# Patient Record
Sex: MALE | Race: WHITE | NOT HISPANIC OR LATINO | URBAN - METROPOLITAN AREA
[De-identification: names, ages, dates, MRNs, and addresses within clinical notes are randomized per-mention and may not be internally consistent; named-entity substitution may affect disease eponyms.]

---

## 2018-04-20 ENCOUNTER — INPATIENT (INPATIENT)
Facility: HOSPITAL | Age: 83
LOS: 10 days | Discharge: HOME | DRG: 871 | End: 2018-05-01
Attending: INTERNAL MEDICINE | Admitting: INTERNAL MEDICINE
Payer: MEDICARE

## 2018-04-20 VITALS
SYSTOLIC BLOOD PRESSURE: 133 MMHG | RESPIRATION RATE: 16 BRPM | OXYGEN SATURATION: 98 % | TEMPERATURE: 98 F | DIASTOLIC BLOOD PRESSURE: 86 MMHG | HEART RATE: 78 BPM

## 2018-04-20 DIAGNOSIS — E87.5 HYPERKALEMIA: ICD-10-CM

## 2018-04-20 DIAGNOSIS — N17.9 ACUTE KIDNEY FAILURE, UNSPECIFIED: ICD-10-CM

## 2018-04-20 LAB
ALBUMIN SERPL ELPH-MCNC: 3.4 G/DL — SIGNIFICANT CHANGE UP (ref 3.3–5)
ALP SERPL-CCNC: 91 U/L — SIGNIFICANT CHANGE UP (ref 40–120)
ALT FLD-CCNC: 13 U/L — SIGNIFICANT CHANGE UP (ref 10–45)
ANION GAP SERPL CALC-SCNC: 22 MMOL/L — HIGH (ref 5–17)
ANION GAP SERPL CALC-SCNC: 23 MMOL/L — HIGH (ref 5–17)
ANION GAP SERPL CALC-SCNC: 24 MMOL/L — HIGH (ref 5–17)
APPEARANCE UR: CLEAR — SIGNIFICANT CHANGE UP
APTT BLD: 26.9 SEC — LOW (ref 27.5–37.4)
AST SERPL-CCNC: 58 U/L — HIGH (ref 10–40)
BASOPHILS # BLD AUTO: 0 K/UL — SIGNIFICANT CHANGE UP (ref 0–0.2)
BASOPHILS NFR BLD AUTO: 0.2 % — SIGNIFICANT CHANGE UP (ref 0–2)
BILIRUB SERPL-MCNC: 0.5 MG/DL — SIGNIFICANT CHANGE UP (ref 0.2–1.2)
BILIRUB UR-MCNC: NEGATIVE — SIGNIFICANT CHANGE UP
BUN SERPL-MCNC: 118 MG/DL — HIGH (ref 7–23)
BUN SERPL-MCNC: 129 MG/DL — HIGH (ref 7–23)
BUN SERPL-MCNC: 131 MG/DL — HIGH (ref 7–23)
CALCIUM SERPL-MCNC: 9 MG/DL — SIGNIFICANT CHANGE UP (ref 8.4–10.5)
CALCIUM SERPL-MCNC: 9 MG/DL — SIGNIFICANT CHANGE UP (ref 8.4–10.5)
CALCIUM SERPL-MCNC: 9.2 MG/DL — SIGNIFICANT CHANGE UP (ref 8.4–10.5)
CHLORIDE SERPL-SCNC: 94 MMOL/L — LOW (ref 96–108)
CHLORIDE SERPL-SCNC: 94 MMOL/L — LOW (ref 96–108)
CHLORIDE SERPL-SCNC: 99 MMOL/L — SIGNIFICANT CHANGE UP (ref 96–108)
CO2 SERPL-SCNC: 17 MMOL/L — LOW (ref 22–31)
CO2 SERPL-SCNC: 19 MMOL/L — LOW (ref 22–31)
CO2 SERPL-SCNC: 20 MMOL/L — LOW (ref 22–31)
COLOR SPEC: YELLOW — SIGNIFICANT CHANGE UP
CREAT SERPL-MCNC: 6.16 MG/DL — HIGH (ref 0.5–1.3)
CREAT SERPL-MCNC: 7.08 MG/DL — HIGH (ref 0.5–1.3)
CREAT SERPL-MCNC: 7.44 MG/DL — HIGH (ref 0.5–1.3)
DIFF PNL FLD: ABNORMAL
EOSINOPHIL # BLD AUTO: 0 K/UL — SIGNIFICANT CHANGE UP (ref 0–0.5)
EOSINOPHIL NFR BLD AUTO: 0 % — SIGNIFICANT CHANGE UP (ref 0–6)
GAS PNL BLDV: SIGNIFICANT CHANGE UP
GLUCOSE SERPL-MCNC: 115 MG/DL — HIGH (ref 70–99)
GLUCOSE SERPL-MCNC: 129 MG/DL — HIGH (ref 70–99)
GLUCOSE SERPL-MCNC: 95 MG/DL — SIGNIFICANT CHANGE UP (ref 70–99)
GLUCOSE UR QL: NEGATIVE — SIGNIFICANT CHANGE UP
HCT VFR BLD CALC: 40 % — SIGNIFICANT CHANGE UP (ref 39–50)
HGB BLD-MCNC: 13.4 G/DL — SIGNIFICANT CHANGE UP (ref 13–17)
INR BLD: 1.13 RATIO — SIGNIFICANT CHANGE UP (ref 0.88–1.16)
KETONES UR-MCNC: NEGATIVE — SIGNIFICANT CHANGE UP
LEUKOCYTE ESTERASE UR-ACNC: NEGATIVE — SIGNIFICANT CHANGE UP
LYMPHOCYTES # BLD AUTO: 0.9 K/UL — LOW (ref 1–3.3)
LYMPHOCYTES # BLD AUTO: 4.3 % — LOW (ref 13–44)
MCHC RBC-ENTMCNC: 30.7 PG — SIGNIFICANT CHANGE UP (ref 27–34)
MCHC RBC-ENTMCNC: 33.4 GM/DL — SIGNIFICANT CHANGE UP (ref 32–36)
MCV RBC AUTO: 91.9 FL — SIGNIFICANT CHANGE UP (ref 80–100)
MONOCYTES # BLD AUTO: 1.1 K/UL — HIGH (ref 0–0.9)
MONOCYTES NFR BLD AUTO: 5.6 % — SIGNIFICANT CHANGE UP (ref 2–14)
NEUTROPHILS # BLD AUTO: 18.1 K/UL — HIGH (ref 1.8–7.4)
NEUTROPHILS NFR BLD AUTO: 89.9 % — HIGH (ref 43–77)
NITRITE UR-MCNC: NEGATIVE — SIGNIFICANT CHANGE UP
PH UR: 6 — SIGNIFICANT CHANGE UP (ref 5–8)
PLATELET # BLD AUTO: 194 K/UL — SIGNIFICANT CHANGE UP (ref 150–400)
POTASSIUM SERPL-MCNC: 5.4 MMOL/L — HIGH (ref 3.5–5.3)
POTASSIUM SERPL-MCNC: 6.7 MMOL/L — CRITICAL HIGH (ref 3.5–5.3)
POTASSIUM SERPL-MCNC: 8.4 MMOL/L — CRITICAL HIGH (ref 3.5–5.3)
POTASSIUM SERPL-SCNC: 5.4 MMOL/L — HIGH (ref 3.5–5.3)
POTASSIUM SERPL-SCNC: 6.7 MMOL/L — CRITICAL HIGH (ref 3.5–5.3)
POTASSIUM SERPL-SCNC: 8.4 MMOL/L — CRITICAL HIGH (ref 3.5–5.3)
PROT SERPL-MCNC: 7.7 G/DL — SIGNIFICANT CHANGE UP (ref 6–8.3)
PROT UR-MCNC: SIGNIFICANT CHANGE UP
PROTHROM AB SERPL-ACNC: 12.3 SEC — SIGNIFICANT CHANGE UP (ref 9.8–12.7)
RBC # BLD: 4.35 M/UL — SIGNIFICANT CHANGE UP (ref 4.2–5.8)
RBC # FLD: 12.2 % — SIGNIFICANT CHANGE UP (ref 10.3–14.5)
RBC CASTS # UR COMP ASSIST: ABNORMAL /HPF (ref 0–2)
SODIUM SERPL-SCNC: 135 MMOL/L — SIGNIFICANT CHANGE UP (ref 135–145)
SODIUM SERPL-SCNC: 137 MMOL/L — SIGNIFICANT CHANGE UP (ref 135–145)
SODIUM SERPL-SCNC: 140 MMOL/L — SIGNIFICANT CHANGE UP (ref 135–145)
SP GR SPEC: 1.02 — SIGNIFICANT CHANGE UP (ref 1.01–1.02)
UROBILINOGEN FLD QL: NEGATIVE — SIGNIFICANT CHANGE UP
WBC # BLD: 20.1 K/UL — HIGH (ref 3.8–10.5)
WBC # FLD AUTO: 20.1 K/UL — HIGH (ref 3.8–10.5)
WBC UR QL: SIGNIFICANT CHANGE UP /HPF (ref 0–5)

## 2018-04-20 PROCEDURE — 74176 CT ABD & PELVIS W/O CONTRAST: CPT | Mod: 26

## 2018-04-20 PROCEDURE — 99233 SBSQ HOSP IP/OBS HIGH 50: CPT | Mod: GC

## 2018-04-20 PROCEDURE — 93010 ELECTROCARDIOGRAM REPORT: CPT

## 2018-04-20 PROCEDURE — 71045 X-RAY EXAM CHEST 1 VIEW: CPT | Mod: 26

## 2018-04-20 PROCEDURE — 99291 CRITICAL CARE FIRST HOUR: CPT

## 2018-04-20 PROCEDURE — 71250 CT THORAX DX C-: CPT | Mod: 26

## 2018-04-20 RX ORDER — INSULIN HUMAN 100 [IU]/ML
10 INJECTION, SOLUTION SUBCUTANEOUS ONCE
Qty: 0 | Refills: 0 | Status: COMPLETED | OUTPATIENT
Start: 2018-04-20 | End: 2018-04-20

## 2018-04-20 RX ORDER — CEFTRIAXONE 500 MG/1
1 INJECTION, POWDER, FOR SOLUTION INTRAMUSCULAR; INTRAVENOUS ONCE
Qty: 0 | Refills: 0 | Status: DISCONTINUED | OUTPATIENT
Start: 2018-04-20 | End: 2018-04-20

## 2018-04-20 RX ORDER — SODIUM CHLORIDE 9 MG/ML
1000 INJECTION, SOLUTION INTRAVENOUS
Qty: 0 | Refills: 0 | Status: DISCONTINUED | OUTPATIENT
Start: 2018-04-20 | End: 2018-04-21

## 2018-04-20 RX ORDER — AZITHROMYCIN 500 MG/1
500 TABLET, FILM COATED ORAL ONCE
Qty: 0 | Refills: 0 | Status: COMPLETED | OUTPATIENT
Start: 2018-04-20 | End: 2018-04-20

## 2018-04-20 RX ORDER — DEXTROSE 50 % IN WATER 50 %
50 SYRINGE (ML) INTRAVENOUS ONCE
Qty: 0 | Refills: 0 | Status: COMPLETED | OUTPATIENT
Start: 2018-04-20 | End: 2018-04-20

## 2018-04-20 RX ORDER — CALCIUM GLUCONATE 100 MG/ML
1 VIAL (ML) INTRAVENOUS ONCE
Qty: 0 | Refills: 0 | Status: COMPLETED | OUTPATIENT
Start: 2018-04-20 | End: 2018-04-20

## 2018-04-20 RX ORDER — SODIUM CHLORIDE 9 MG/ML
1000 INJECTION INTRAMUSCULAR; INTRAVENOUS; SUBCUTANEOUS ONCE
Qty: 0 | Refills: 0 | Status: COMPLETED | OUTPATIENT
Start: 2018-04-20 | End: 2018-04-20

## 2018-04-20 RX ORDER — SODIUM BICARBONATE 1 MEQ/ML
50 SYRINGE (ML) INTRAVENOUS ONCE
Qty: 0 | Refills: 0 | Status: COMPLETED | OUTPATIENT
Start: 2018-04-20 | End: 2018-04-20

## 2018-04-20 RX ORDER — CEFTRIAXONE 500 MG/1
1 INJECTION, POWDER, FOR SOLUTION INTRAMUSCULAR; INTRAVENOUS ONCE
Qty: 0 | Refills: 0 | Status: COMPLETED | OUTPATIENT
Start: 2018-04-20 | End: 2018-04-20

## 2018-04-20 RX ADMIN — SODIUM CHLORIDE 200 MILLILITER(S): 9 INJECTION INTRAMUSCULAR; INTRAVENOUS; SUBCUTANEOUS at 18:13

## 2018-04-20 RX ADMIN — AZITHROMYCIN 250 MILLIGRAM(S): 500 TABLET, FILM COATED ORAL at 19:35

## 2018-04-20 RX ADMIN — Medication 50 MILLIEQUIVALENT(S): at 19:36

## 2018-04-20 RX ADMIN — CEFTRIAXONE 100 GRAM(S): 500 INJECTION, POWDER, FOR SOLUTION INTRAMUSCULAR; INTRAVENOUS at 20:58

## 2018-04-20 RX ADMIN — INSULIN HUMAN 10 UNIT(S): 100 INJECTION, SOLUTION SUBCUTANEOUS at 19:37

## 2018-04-20 RX ADMIN — SODIUM CHLORIDE 100 MILLILITER(S): 9 INJECTION, SOLUTION INTRAVENOUS at 23:27

## 2018-04-20 RX ADMIN — Medication 200 GRAM(S): at 19:37

## 2018-04-20 RX ADMIN — Medication 50 MILLILITER(S): at 19:36

## 2018-04-20 NOTE — CONSULT NOTE ADULT - SUBJECTIVE AND OBJECTIVE BOX
86yom no PMHx (has not seen a doctor for years) initially presents for a mechanical fall, found to have acute renail failure and hyperkalemia to 8. Per wife, patient had not been taking care of himself, has been unable to walk, unshaven face, not eating. This change has been gradual and progressive.   Patient himself seems not aware of these changes. He does not remember that he had fallen, and denied any complaints. He was aware he was at hospital, but could not say name. He beleived it to be 1936.     Initial potassium improved to 6.7 s/p hyperkalemia cocktail.  Pt had CT A/P done, showed b/l mild hydronpehrosis. Also had cat placed with 2L of copper collar urine. CT chest showed RLL pna.     ED: Sodium bicarb x 1, insulin 10 x 1, d50 x 1, calcium gluc 1g.   1 L of fluids, Cat placed. Received ceftriaxone and azithromycin.     Physical Exam:   General: Unkept appearing elderly male, NAD  HEENT: poor dentition  CV: s1s2 mildly tachycardic, no murmurs  Pulm: RLL rhonchi  Abd: soft, nt, nd, indendation in epigastric area?   : no suprapubic tenderness, +cat.   Ext: 1+ b/l LE edema 86yom no PMHx (has not seen a doctor for years) initially presents for a mechanical fall, found to have acute renail failure and hyperkalemia to 8. Per wife, patient had not been taking care of himself, has been unable to walk, unshaven face, not eating. This change has been gradual and progressive.   Patient himself seems not aware of these changes. He does not remember that he had fallen, and denied any complaints. He was aware he was at hospital, but could not say name. He beleived it to be 1936.     Initial potassium improved to 6.7 s/p hyperkalemia cocktail.  Pt had CT A/P done, showed b/l mild hydronpehrosis. Also had cat placed with 2L of copper collar urine. CT chest showed RLL pna.     ED: Sodium bicarb x 1, insulin 10 x 1, d50 x 1, calcium gluc 1g.   1 L of fluids, Cat placed. Received ceftriaxone and azithromycin.     PAST MEDICAL & SURGICAL HISTORY:  No pertinent past medical history  No significant past surgical history    Allergies    No Known Allergies or Intolerances    Home Medications: None    SOCIAL HISTORY: Unknown if ever smokes. Lives with wife    FAMILY HISTORY: nonsignificant     CONSTITUTIONAL:  No weight loss, fever, chills, weakness  HEENT:  no vision changes, no URI symptoms.   SKIN:  No rash or itching.  CARDIOVASCULAR:  No chest pain or palpitations.   RESPIRATORY:  No shortness of breath, cough or sputum.  GASTROINTESTINAL:  No nausea, vomiting or diarrhea. No abdominal pain  GENITOURINARY:  decreased urination x 2 days.   NEUROLOGICAL:  No headache, dizziness, syncope, numbness or tingling in the extremities.   MUSCULOSKELETAL: + R shoulder pain  HEMATOLOGIC:  No bleeding or bruising.  ENDOCRINOLOGIC:  No reports of sweating, cold or heat intolerance.     Vital Signs Last 24 Hrs  T(C): 36.7 (2018 21:33), Max: 36.7 (2018 15:10)  T(F): 98.1 (2018 21:33), Max: 98.1 (2018 21:33)  HR: 101 (2018 21:33) (78 - 106)  BP: 130/80 (2018 21:33) (130/80 - 146/87)  BP(mean): --  RR: 16 (2018 21:33) (16 - 22)  SpO2: 98% (2018 21:33) (98% - 100%)    Physical Exam:   General: Unkept appearing elderly male, NAD  HEENT: poor dentition  CV: s1s2 mildly tachycardic, no murmurs  Pulm: RLL rhonchi  Abd: soft, nt, nd, indendation in epigastric area?   : no suprapubic tenderness, +cat.   Ext: 1+ b/l LE edema                           13.4   20.1  )-----------( 194      ( 2018 16:07 )             40.0     Auto Eosinophil # 0.0   / Auto Eosinophil % 0.0   / Auto Neutrophil # 18.1  / Auto Neutrophil % 89.9  / BANDS % x        04-20    140  |  99  |  118<H>  ----------------------------<  95  5.4<H>   |  19<L>  |  6.16<H>  04-20    137  |  94<L>  |  131<H>  ----------------------------<  129<H>  6.7<HH>   |  20<L>  |  7.44<H>  04-20    135  |  94<L>  |  129<H>  ----------------------------<  115<H>  8.4<HH>   |  17<L>  |  7.08<H>    Ca    9.0      2018 21:16  TPro  7.7  /  Alb  3.4  /  TBili  0.5  /  DBili  x   /  AST  58<H>  /  ALT  13  /  AlkPhos  91  04-20    PT/INR - ( 2018 20:07 )   PT: 12.3 sec;   INR: 1.13 ratio         PTT - ( 2018 20:07 )  PTT:26.9 sec  CARDIAC MARKERS ( 2018 21:16 )  x     / x     / 190 U/L / x     / x          Urinalysis Basic - ( 2018 18:29 )    Color: Yellow / Appearance: Clear / S.016 / pH: x  Gluc: x / Ketone: Negative  / Bili: Negative / Urobili: Negative   Blood: x / Protein: Trace / Nitrite: Negative   Leuk Esterase: Negative / RBC: 10-25 /HPF / WBC 3-5 /HPF   Sq Epi: x / Non Sq Epi: x / Bacteria: x    EKG: peaked T waves in V2 and V3 on admission.     CT Chest/A/PL Right lower lobe pneumonia. Small right pleural effusion.  Mild bilateral hydroureteronephrosis and distended urinary bladder. 86yom no PMHx (has not seen a doctor for years) initially presents for a mechanical fall, found to have acute renail failure and hyperkalemia to 8. Per wife, patient had not been taking care of himself, has been unable to walk, unshaven face, not eating. This change has been gradual and progressive.   Patient himself seems not aware of these changes. He does not remember that he had fallen, and denied any complaints. He was aware he was at hospital, but could not say name. He beleived it to be 1936.     Initial potassium improved to 6.7 s/p hyperkalemia cocktail.  Pt had CT A/P done, showed b/l mild hydronpehrosis. Also had cat placed with 2L of copper collar urine. CT chest showed RLL pna.     ED: Sodium bicarb x 1, insulin 10 x 1, d50 x 1, calcium gluc 1g.   1 L of fluids, Cat placed. Received ceftriaxone and azithromycin.     PAST MEDICAL & SURGICAL HISTORY:  No pertinent past medical history  No significant past surgical history    Allergies    No Known Allergies or Intolerances    Home Medications: None    SOCIAL HISTORY: Unknown if ever smokes. Lives with wife    FAMILY HISTORY: nonsignificant     Review of systems:   CONSTITUTIONAL:  No weight loss, fever, chills, weakness  HEENT:  no vision changes, no URI symptoms.   SKIN:  No rash or itching.  CARDIOVASCULAR:  No chest pain or palpitations.   RESPIRATORY:  No shortness of breath, cough or sputum.  GASTROINTESTINAL:  No nausea, vomiting or diarrhea. No abdominal pain  GENITOURINARY:  decreased urination x 2 days.   NEUROLOGICAL:  No headache, dizziness, syncope, numbness or tingling in the extremities.   MUSCULOSKELETAL: + R shoulder pain  HEMATOLOGIC:  No bleeding or bruising.  ENDOCRINOLOGIC:  No reports of sweating, cold or heat intolerance.     Vital Signs Last 24 Hrs  T(C): 36.7 (2018 21:33), Max: 36.7 (2018 15:10)  T(F): 98.1 (2018 21:33), Max: 98.1 (2018 21:33)  HR: 101 (2018 21:33) (78 - 106)  BP: 130/80 (2018 21:33) (130/80 - 146/87)  BP(mean): --  RR: 16 (2018 21:33) (16 - 22)  SpO2: 98% (2018 21:33) (98% - 100%)    Physical Exam:   General: Unkept appearing elderly male, NAD  HEENT: poor dentition  CV: s1s2 mildly tachycardic, no murmurs  Pulm: RLL rhonchi  Abd: soft, nt, nd, indendation in epigastric area?   : no suprapubic tenderness, +cat.   Ext: 1+ b/l LE edema                           13.4   20.1  )-----------( 194      ( 2018 16:07 )             40.0     Auto Eosinophil # 0.0   / Auto Eosinophil % 0.0   / Auto Neutrophil # 18.1  / Auto Neutrophil % 89.9  / BANDS % x        04-20    140  |  99  |  118<H>  ----------------------------<  95  5.4<H>   |  19<L>  |  6.16<H>  04-20    137  |  94<L>  |  131<H>  ----------------------------<  129<H>  6.7<HH>   |  20<L>  |  7.44<H>  04-20    135  |  94<L>  |  129<H>  ----------------------------<  115<H>  8.4<HH>   |  17<L>  |  7.08<H>    Ca    9.0      2018 21:16  TPro  7.7  /  Alb  3.4  /  TBili  0.5  /  DBili  x   /  AST  58<H>  /  ALT  13  /  AlkPhos  91  04-20    PT/INR - ( 2018 20:07 )   PT: 12.3 sec;   INR: 1.13 ratio         PTT - ( 2018 20:07 )  PTT:26.9 sec  CARDIAC MARKERS ( 2018 21:16 )  x     / x     / 190 U/L / x     / x          Urinalysis Basic - ( 2018 18:29 )    Color: Yellow / Appearance: Clear / S.016 / pH: x  Gluc: x / Ketone: Negative  / Bili: Negative / Urobili: Negative   Blood: x / Protein: Trace / Nitrite: Negative   Leuk Esterase: Negative / RBC: 10-25 /HPF / WBC 3-5 /HPF   Sq Epi: x / Non Sq Epi: x / Bacteria: x    EKG: peaked T waves in V2 and V3 on admission.     CT Chest/A/PL Right lower lobe pneumonia. Small right pleural effusion.  Mild bilateral hydroureteronephrosis and distended urinary bladder.

## 2018-04-20 NOTE — ED PROVIDER NOTE - OBJECTIVE STATEMENT
83 y/o male who presents s/p fall that has been described as mechanical in nature without any associated LOC, HA, visual disturbance, neck pain CP, palpitation, AP, N/V or focal weakness either prior to or after the event. Only when asked did he agree to some R shoulder pain. His wife however has offered a host of other concerns including his not eating, inability to ambulate, unshaven face, the fact that she he prefers to wear thick "athletic" socks instead of shoes. She agrees that he has had a constant, gradual, progressive decline in both his physical condition and cognitive capacity, which appears to have been exacerbated by his poor oral intake and personal hygiene and has been without relieving factor. He has not seen a physician in years.

## 2018-04-20 NOTE — ED ADULT NURSE REASSESSMENT NOTE - NS ED NURSE REASSESS COMMENT FT1
cat placed by previous RN. 1800mL emptied from bag. Pt is more alert and speaking to RN. Pt repositioned. sleeping in bed at this time. will reassess.

## 2018-04-20 NOTE — ED ADULT NURSE NOTE - ED STAT RN HANDOFF DETAILS
Bedside report given to on coming nurse Gaby Villalobos. Understands pmh, medications given and plan of care for patient. Patient in stable condition, vital signs updated, has no complaints at this time and has been updated on care plan. Explained to patient that it is change of shift and new nurse is taking over, pt verbalized understanding.

## 2018-04-20 NOTE — ED ADULT NURSE NOTE - OBJECTIVE STATEMENT
83 y/o male BIBA after fall yesterday. Pt states he had a trip and fall and did not hit his Head. C/o right shoulder pain. Wife states he has been more confused than usual with decreased walking and PO intake. Denies LOC, chest pain, sob, ha, n/v/d, abdominal pain, f/c, urinary symptoms, hematuria. A&Ox4, tachycardic, skin warm dry and intact, MAEx4, lungs CTA, abd soft nondistended. At home uses a walker. Pt resting comfortably no complaints at this time. Patient's bed in the lowest position, explained plan of care to patient and family members. Will continue to reassess.

## 2018-04-20 NOTE — ED PROVIDER NOTE - CRITICAL CARE PROVIDED
documentation/consultation with other physicians/consult w/ pt's family directly relating to pts condition/interpretation of diagnostic studies/additional history taking/direct patient care (not related to procedure)

## 2018-04-20 NOTE — CONSULT NOTE ADULT - PROBLEM SELECTOR RECOMMENDATION 9
MATTHEW in the setting of obstruction. Pt with b/l hydronephrosis and significant urine output with cat placement. Recommend rule out underlying rhabdomyolysis in the setting of fall with CPK level. Recommend BNP level. Pt would benefit from bicarbonate drip IV therapy. Continue to monitor urine output, BMP and intake/output. Avoid NSAIDs, ACEI/ARBs, RCA and nephrotoxins

## 2018-04-20 NOTE — CONSULT NOTE ADULT - ASSESSMENT
86yom no PMHx (has not seen a doctor for years) initially presents for a mechanical fall, found to have acute renail failure and hyperkalemia likely multifactorial in setting of decreased po intake/dedhydration, obstructive uropathy, possible underling DM2 (FS elevated). Pt also with worsening mental status, likely progressive dementia.     Hyperkalemia 2/2 Acute renail failure:    -serial BMP  -continue with cat   -repeat EKG  -f/up CK  -tele monitoring  -monitor ins and outs  - Avoid NSAIDs, ACEI/ARBs,nephrotoxins.  --f/up nephrology recs   -check hgba1c (FS elevated)     Metabolic encephalopathy: AO x 1-2. May be 2/2 uremia however, likely underlying dementia also given progressive decline.   -would check b12, folate, tsh   -social work consult     Would admit to tele. 86yom no PMHx (has not seen a doctor for years) initially presents for a mechanical fall, found to have acute renail failure and hyperkalemia likely multifactorial in setting of decreased po intake/dedhydration, obstructive uropathy, possible underling DM2 (FS elevated). Pt also with worsening mental status, likely progressive dementia.     Hyperkalemia 2/2 Acute renail failure:    -serial BMP  -continue with cat   -repeat EKG  -f/up CK  -tele monitoring  -monitor ins and outs  - Avoid NSAIDs, ACEI/ARBs,nephrotoxins.  --f/up nephrology recs   -check hgba1c (FS elevated)     Metabolic encephalopathy: AO x 1-2. May be 2/2 uremia however, likely underlying dementia also given progressive decline.   -would check b12, folate, tsh   -social work consult     Sepsis: Pt with leukocytosis and tachycardia, meeting SIRS criteria. RLL pna. Remains hemodynamically stable.   -f/up blood cultures  -monitor white count  -continue with ceftriaxone, however if fails to improve, consider broadening to cover asp pna.     Patient currently does not have an indication for urgent HD/not a MICU candidate.   Would admit to telemetry.     d/w Dr. Justice James, PGY3  [phone]

## 2018-04-20 NOTE — CONSULT NOTE ADULT - ATTENDING COMMENTS
87 yo man who presented today after a fall with ARF and hyperkalemia found to have bilateral hydronephrosis and distended bladder.  After relief of bladder outlet obstruction and drainage of 1700+ cc urine, K has decreased to 5.4.  Creatinine also beginning to decrease.  He is hemodynamically stable, not in need of emergent HD and not in need of critical care at this time
Pt seen4/21 feeling improved  1.  HyperK+--responsive to HCO3.  Continue drip.  Can switch to hypotonic with increase in Na  2.  Acidosis--responding to NaHCO3  3.  Renal failure--non oliguric, no HD  4.  Obstructive uropathy--continue cat drainage

## 2018-04-20 NOTE — ED PROVIDER NOTE - CARE PLAN
Principal Discharge DX:	MATTHEW (acute kidney injury)  Secondary Diagnosis:	Hyperkalemia  Secondary Diagnosis:	Pneumonia

## 2018-04-20 NOTE — ED PROVIDER NOTE - PROGRESS NOTE DETAILS
Labs back thus far noted. Additional labs ordered. Wife now reports his not having urinated in 2 days. Straight cath dry. Junior placed. IVF started. CXR appreciated. Awaiting interpretation of CTs. Patient has produced nearly 1700ccs of initially copper colored and now more clear urine. HyperK cocktail already provided. Discussed with Nephrology. Discussed with MICU. CT appreciated. ABX provided. K improved. Nephrology does not recommend emergent HD. Discussed with MICU. Patient to be admitted to Medicine.

## 2018-04-20 NOTE — ED PROVIDER NOTE - SKIN, MLM
Skin warm, dry and intact. Dry, flaky, bilateral lower extremities c/ 2-3+ pitting edema and thickening of toenail c/w onychomycosis.

## 2018-04-20 NOTE — ED PROVIDER NOTE - MEDICAL DECISION MAKING DETAILS
Patient reportedly brought to the ED due to a fall but this is in fact not the primary issue. He sustained no injury. He is in very poor physical condition and based on my discussion with his wife and his physical appearance, it is clear he has had a gradual, progressive decline in both cognitive and physical capacity. He is not capable of caring for himself and his wife is not capable of caring for him on his own. He has not seen a physician in years. Without assistance, I do not believe he is safe in the home. Basic screening studies have been ordered for the purpose of admission. Patient reportedly brought to the ED due to a fall but this is in fact not the primary issue. He sustained no injury. He is in very poor physical condition and based on my discussion with his wife and his physical appearance, it is clear he has had a gradual, progressive decline in both cognitive and physical capacity. He is not capable of caring for himself and his wife is not capable of caring for him on his own. He has not seen a physician in years. Without assistance, I do not believe he is safe in the home. Basic screening studies have been ordered for the purpose of admission. He does not require any specific diagnostic study as it relates to his fall.

## 2018-04-20 NOTE — ED PROVIDER NOTE - GASTROINTESTINAL, MLM
Abdomen soft, non-tender, no guarding. Abdomen soft, non-tender, no guarding. Soft, non-tender L inguinal hernia.

## 2018-04-20 NOTE — CONSULT NOTE ADULT - ASSESSMENT
86 year old male with no known PMH presents to ER after a fall. Pt was found to have hyperkalemia and acute renal failure in ER.

## 2018-04-20 NOTE — ED PROVIDER NOTE - MUSCULOSKELETAL, MLM
Spine appears normal, range of motion is not limited, no muscle or joint tenderness. FROM of R shoulder s/ hesitation, discomfort or reproducible ttp.

## 2018-04-20 NOTE — ED PROVIDER NOTE - CONSTITUTIONAL, MLM
normal... Unkempt. Emaciated. Cachectic. Awake, alert, oriented to person, place, time/situation (did not know the year; knew it was Spring) and in no apparent distress.

## 2018-04-20 NOTE — CONSULT NOTE ADULT - PROBLEM SELECTOR RECOMMENDATION 2
Pt with elevated serum potassium in the setting of MATTHEW and obstruction. Recommend repeat BMP stat. Pt amenable to dialysis if needed for hyperkalemia. Pt would need strict telemetry if dialysis is needed. Advise to start bicarbonate drip

## 2018-04-20 NOTE — CONSULT NOTE ADULT - SUBJECTIVE AND OBJECTIVE BOX
[facility] DIVISION OF KIDNEY DISEASES AND HYPERTENSION -- INITIAL CONSULT NOTE  --------------------------------------------------------------------------------  HPI: 86 year old male with no known PMH presents to ER after a fall. Pt was found to have hyperkalemia and acute renal failure in ER. Pt states he has not any history of renal failure in the past. Pt states he has not seen a physician in the past (over 5-10 years). Pt denies NSAID use, Drug use or ABX use. Pt denies family history of renal disease. Pt states he has been eating normally and has been urination without difficulty. Pt seen and examined in ER. Denies CP, SOB, palpitations, fever, chills and cough. Pt notes LE edema in the last one week. Denies SOB on exertion.  Pt had a cat catheter placed in the ER with 1700cc output in one hour. Pt had CT Ab/pelvis in ER which revealed Mild hydronephrosis bilaterally with distended bladder.     PAST HISTORY  --------------------------------------------------------------------------------  PAST MEDICAL & SURGICAL HISTORY:  No pertinent past medical history  No significant past surgical history    FAMILY HISTORY:    PAST SOCIAL HISTORY:    ALLERGIES & MEDICATIONS  --------------------------------------------------------------------------------  Allergies    No Known Allergies    Intolerances      Standing Inpatient Medications    PRN Inpatient Medications      REVIEW OF SYSTEMS  --------------------------------------------------------------------------------  Gen: No fatigue, fevers/chills, weakness  Head/Eyes/Ears/Mouth: No headache  Respiratory: No dyspnea, cough  CV: No chest pain, PND  GI: No abdominal pain, diarrhea, constipation, nausea, vomiting  : No increased frequency  MSK: +edema  Neuro: No dizziness/lightheadedness  Heme: No bleeding    All other systems were reviewed and are negative, except as noted.    VITALS/PHYSICAL EXAM  --------------------------------------------------------------------------------  T(C): 36.7 (04-20-18 @ 15:10), Max: 36.7 (04-20-18 @ 15:10)  HR: 85 (04-20-18 @ 16:48) (78 - 106)  BP: 143/85 (04-20-18 @ 16:48) (133/86 - 146/87)  RR: 18 (04-20-18 @ 16:48) (16 - 22)  SpO2: 99% (04-20-18 @ 16:48) (98% - 100%)  Wt(kg): --    Physical Exam:  	Gen: Elderly, Cachetic   	HEENT: Poor dentition   	Pulm: + crackles B/L  	CV: RRR, S1S2; no rub  	Abd: +BS, soft, nontender/nondistended  	: No suprapubic tenderness + cat catheter   	UE: Warm, no asterixis  	LE: Warm, + b/l LE edema  	Neuro: AAOX3, Answers questions appropriately  	Skin: Warm, without rashes    LABS/STUDIES  --------------------------------------------------------------------------------              13.4   20.1  >-----------<  194      [04-20-18 @ 16:07]              40.0     137  |  94  |  131  ----------------------------<  129      [04-20-18 @ 17:41]  6.7   |  20  |  7.44        Ca     9.2     [04-20-18 @ 17:41]    TPro  7.7  /  Alb  3.4  /  TBili  0.5  /  DBili  x   /  AST  58  /  ALT  13  /  AlkPhos  91  [04-20-18 @ 16:07]    PT/INR: PT 12.3 , INR 1.13       [04-20-18 @ 20:07]  PTT: 26.9       [04-20-18 @ 20:07]      Creatinine Trend:  SCr 7.44 [04-20 @ 17:41]  SCr 7.08 [04-20 @ 16:07]    Urinalysis - [04-20-18 @ 18:29]      Color Yellow / Appearance Clear / SG 1.016 / pH 6.0      Gluc Negative / Ketone Negative  / Bili Negative / Urobili Negative       Blood Moderate / Protein Trace / Leuk Est Negative / Nitrite Negative      RBC 10-25 / WBC 3-5 / Hyaline  / Gran  / Sq Epi  / Non Sq Epi  / Bacteria

## 2018-04-21 DIAGNOSIS — Z29.9 ENCOUNTER FOR PROPHYLACTIC MEASURES, UNSPECIFIED: ICD-10-CM

## 2018-04-21 DIAGNOSIS — N17.9 ACUTE KIDNEY FAILURE, UNSPECIFIED: ICD-10-CM

## 2018-04-21 DIAGNOSIS — A41.9 SEPSIS, UNSPECIFIED ORGANISM: ICD-10-CM
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
## 2018-04-21 DIAGNOSIS — E87.2 ACIDOSIS: ICD-10-CM

## 2018-04-21 DIAGNOSIS — J18.1 LOBAR PNEUMONIA, UNSPECIFIED ORGANISM: ICD-10-CM

## 2018-04-21 DIAGNOSIS — R63.8 OTHER SYMPTOMS AND SIGNS CONCERNING FOOD AND FLUID INTAKE: ICD-10-CM

## 2018-04-21 DIAGNOSIS — G93.41 METABOLIC ENCEPHALOPATHY: ICD-10-CM

## 2018-04-21 DIAGNOSIS — M79.89 OTHER SPECIFIED SOFT TISSUE DISORDERS: ICD-10-CM

## 2018-04-21 LAB
ALBUMIN SERPL ELPH-MCNC: 3.5 G/DL — SIGNIFICANT CHANGE UP (ref 3.3–5)
ALP SERPL-CCNC: 93 U/L — SIGNIFICANT CHANGE UP (ref 40–120)
ALT FLD-CCNC: 12 U/L — SIGNIFICANT CHANGE UP (ref 10–45)
ANION GAP SERPL CALC-SCNC: 16 MMOL/L — SIGNIFICANT CHANGE UP (ref 5–17)
ANION GAP SERPL CALC-SCNC: 17 MMOL/L — SIGNIFICANT CHANGE UP (ref 5–17)
ANION GAP SERPL CALC-SCNC: 18 MMOL/L — HIGH (ref 5–17)
APTT BLD: 26.8 SEC — LOW (ref 27.5–37.4)
AST SERPL-CCNC: 8 U/L — LOW (ref 10–40)
BASOPHILS # BLD AUTO: 0 K/UL — SIGNIFICANT CHANGE UP (ref 0–0.2)
BILIRUB SERPL-MCNC: 0.5 MG/DL — SIGNIFICANT CHANGE UP (ref 0.2–1.2)
BUN SERPL-MCNC: 100 MG/DL — HIGH (ref 7–23)
BUN SERPL-MCNC: 100 MG/DL — HIGH (ref 7–23)
BUN SERPL-MCNC: 94 MG/DL — HIGH (ref 7–23)
CALCIUM SERPL-MCNC: 8.8 MG/DL — SIGNIFICANT CHANGE UP (ref 8.4–10.5)
CALCIUM SERPL-MCNC: 9 MG/DL — SIGNIFICANT CHANGE UP (ref 8.4–10.5)
CALCIUM SERPL-MCNC: 9.2 MG/DL — SIGNIFICANT CHANGE UP (ref 8.4–10.5)
CHLORIDE SERPL-SCNC: 100 MMOL/L — SIGNIFICANT CHANGE UP (ref 96–108)
CHLORIDE SERPL-SCNC: 101 MMOL/L — SIGNIFICANT CHANGE UP (ref 96–108)
CHLORIDE SERPL-SCNC: 103 MMOL/L — SIGNIFICANT CHANGE UP (ref 96–108)
CO2 SERPL-SCNC: 24 MMOL/L — SIGNIFICANT CHANGE UP (ref 22–31)
CO2 SERPL-SCNC: 25 MMOL/L — SIGNIFICANT CHANGE UP (ref 22–31)
CO2 SERPL-SCNC: 27 MMOL/L — SIGNIFICANT CHANGE UP (ref 22–31)
CREAT ?TM UR-MCNC: 86 MG/DL — SIGNIFICANT CHANGE UP
CREAT SERPL-MCNC: 3.17 MG/DL — HIGH (ref 0.5–1.3)
CREAT SERPL-MCNC: 4.3 MG/DL — HIGH (ref 0.5–1.3)
CREAT SERPL-MCNC: 4.36 MG/DL — HIGH (ref 0.5–1.3)
CULTURE RESULTS: NO GROWTH — SIGNIFICANT CHANGE UP
EOSINOPHIL # BLD AUTO: 0 K/UL — SIGNIFICANT CHANGE UP (ref 0–0.5)
FOLATE SERPL-MCNC: 8.1 NG/ML — SIGNIFICANT CHANGE UP
GAS PNL BLDV: SIGNIFICANT CHANGE UP
GAS PNL BLDV: SIGNIFICANT CHANGE UP
GLUCOSE SERPL-MCNC: 127 MG/DL — HIGH (ref 70–99)
GLUCOSE SERPL-MCNC: 133 MG/DL — HIGH (ref 70–99)
GLUCOSE SERPL-MCNC: 141 MG/DL — HIGH (ref 70–99)
HCT VFR BLD CALC: 35.9 % — LOW (ref 39–50)
HGB BLD-MCNC: 11.9 G/DL — LOW (ref 13–17)
LEGIONELLA AG UR QL: NEGATIVE — SIGNIFICANT CHANGE UP
LYMPHOCYTES # BLD AUTO: 0.6 K/UL — LOW (ref 1–3.3)
LYMPHOCYTES # BLD AUTO: 4 % — LOW (ref 13–44)
MAGNESIUM SERPL-MCNC: 2.6 MG/DL — SIGNIFICANT CHANGE UP (ref 1.6–2.6)
MCHC RBC-ENTMCNC: 30.6 PG — SIGNIFICANT CHANGE UP (ref 27–34)
MCHC RBC-ENTMCNC: 33.2 GM/DL — SIGNIFICANT CHANGE UP (ref 32–36)
MCV RBC AUTO: 92.2 FL — SIGNIFICANT CHANGE UP (ref 80–100)
MONOCYTES # BLD AUTO: 1 K/UL — HIGH (ref 0–0.9)
MONOCYTES NFR BLD AUTO: 4 % — SIGNIFICANT CHANGE UP (ref 2–14)
NEUTROPHILS # BLD AUTO: 14.2 K/UL — HIGH (ref 1.8–7.4)
NEUTROPHILS NFR BLD AUTO: 92 % — HIGH (ref 43–77)
NT-PROBNP SERPL-SCNC: 1397 PG/ML — HIGH (ref 0–300)
PCP SPEC-MCNC: SIGNIFICANT CHANGE UP
PHOSPHATE SERPL-MCNC: 4.4 MG/DL — SIGNIFICANT CHANGE UP (ref 2.5–4.5)
PLATELET # BLD AUTO: 201 K/UL — SIGNIFICANT CHANGE UP (ref 150–400)
POTASSIUM SERPL-MCNC: 4.7 MMOL/L — SIGNIFICANT CHANGE UP (ref 3.5–5.3)
POTASSIUM SERPL-MCNC: 4.7 MMOL/L — SIGNIFICANT CHANGE UP (ref 3.5–5.3)
POTASSIUM SERPL-MCNC: 4.9 MMOL/L — SIGNIFICANT CHANGE UP (ref 3.5–5.3)
POTASSIUM SERPL-SCNC: 4.7 MMOL/L — SIGNIFICANT CHANGE UP (ref 3.5–5.3)
POTASSIUM SERPL-SCNC: 4.7 MMOL/L — SIGNIFICANT CHANGE UP (ref 3.5–5.3)
POTASSIUM SERPL-SCNC: 4.9 MMOL/L — SIGNIFICANT CHANGE UP (ref 3.5–5.3)
PROT ?TM UR-MCNC: 57 MG/DL — HIGH (ref 0–12)
PROT SERPL-MCNC: 7.2 G/DL — SIGNIFICANT CHANGE UP (ref 6–8.3)
PROT/CREAT UR-RTO: 0.7 RATIO — HIGH (ref 0–0.2)
RBC # BLD: 3.89 M/UL — LOW (ref 4.2–5.8)
RBC # FLD: 12.2 % — SIGNIFICANT CHANGE UP (ref 10.3–14.5)
SODIUM SERPL-SCNC: 142 MMOL/L — SIGNIFICANT CHANGE UP (ref 135–145)
SODIUM SERPL-SCNC: 143 MMOL/L — SIGNIFICANT CHANGE UP (ref 135–145)
SODIUM SERPL-SCNC: 146 MMOL/L — HIGH (ref 135–145)
SODIUM UR-SCNC: <20 MMOL/L — SIGNIFICANT CHANGE UP
SPECIMEN SOURCE: SIGNIFICANT CHANGE UP
T PALLIDUM AB TITR SER: NEGATIVE — SIGNIFICANT CHANGE UP
TSH SERPL-MCNC: 0.27 UIU/ML — SIGNIFICANT CHANGE UP (ref 0.27–4.2)
UUN UR-MCNC: 1042 MG/DL — SIGNIFICANT CHANGE UP
VIT B12 SERPL-MCNC: 1013 PG/ML — SIGNIFICANT CHANGE UP (ref 232–1245)
WBC # BLD: 16.8 K/UL — HIGH (ref 3.8–10.5)
WBC # FLD AUTO: 16.8 K/UL — HIGH (ref 3.8–10.5)

## 2018-04-21 PROCEDURE — 99223 1ST HOSP IP/OBS HIGH 75: CPT | Mod: GC

## 2018-04-21 PROCEDURE — 70450 CT HEAD/BRAIN W/O DYE: CPT | Mod: 26

## 2018-04-21 PROCEDURE — 99233 SBSQ HOSP IP/OBS HIGH 50: CPT | Mod: GC

## 2018-04-21 RX ORDER — SODIUM POLYSTYRENE SULFONATE 4.1 MEQ/G
30 POWDER, FOR SUSPENSION ORAL ONCE
Qty: 0 | Refills: 0 | Status: COMPLETED | OUTPATIENT
Start: 2018-04-21 | End: 2018-04-21

## 2018-04-21 RX ORDER — SODIUM CHLORIDE 9 MG/ML
1000 INJECTION INTRAMUSCULAR; INTRAVENOUS; SUBCUTANEOUS
Qty: 0 | Refills: 0 | Status: DISCONTINUED | OUTPATIENT
Start: 2018-04-21 | End: 2018-04-21

## 2018-04-21 RX ORDER — TAMSULOSIN HYDROCHLORIDE 0.4 MG/1
0.4 CAPSULE ORAL AT BEDTIME
Qty: 0 | Refills: 0 | Status: DISCONTINUED | OUTPATIENT
Start: 2018-04-21 | End: 2018-05-01

## 2018-04-21 RX ORDER — PIPERACILLIN AND TAZOBACTAM 4; .5 G/20ML; G/20ML
3.38 INJECTION, POWDER, LYOPHILIZED, FOR SOLUTION INTRAVENOUS EVERY 12 HOURS
Qty: 0 | Refills: 0 | Status: DISCONTINUED | OUTPATIENT
Start: 2018-04-21 | End: 2018-04-24

## 2018-04-21 RX ORDER — SODIUM CHLORIDE 9 MG/ML
250 INJECTION INTRAMUSCULAR; INTRAVENOUS; SUBCUTANEOUS ONCE
Qty: 0 | Refills: 0 | Status: COMPLETED | OUTPATIENT
Start: 2018-04-21 | End: 2018-04-21

## 2018-04-21 RX ORDER — HEPARIN SODIUM 5000 [USP'U]/ML
5000 INJECTION INTRAVENOUS; SUBCUTANEOUS EVERY 8 HOURS
Qty: 0 | Refills: 0 | Status: DISCONTINUED | OUTPATIENT
Start: 2018-04-21 | End: 2018-04-23

## 2018-04-21 RX ORDER — SODIUM CHLORIDE 9 MG/ML
1000 INJECTION, SOLUTION INTRAVENOUS
Qty: 0 | Refills: 0 | Status: DISCONTINUED | OUTPATIENT
Start: 2018-04-21 | End: 2018-04-21

## 2018-04-21 RX ADMIN — SODIUM CHLORIDE 500 MILLILITER(S): 9 INJECTION INTRAMUSCULAR; INTRAVENOUS; SUBCUTANEOUS at 13:55

## 2018-04-21 RX ADMIN — HEPARIN SODIUM 5000 UNIT(S): 5000 INJECTION INTRAVENOUS; SUBCUTANEOUS at 14:55

## 2018-04-21 RX ADMIN — SODIUM POLYSTYRENE SULFONATE 30 GRAM(S): 4.1 POWDER, FOR SUSPENSION ORAL at 04:33

## 2018-04-21 RX ADMIN — HEPARIN SODIUM 5000 UNIT(S): 5000 INJECTION INTRAVENOUS; SUBCUTANEOUS at 05:11

## 2018-04-21 RX ADMIN — TAMSULOSIN HYDROCHLORIDE 0.4 MILLIGRAM(S): 0.4 CAPSULE ORAL at 21:22

## 2018-04-21 RX ADMIN — PIPERACILLIN AND TAZOBACTAM 25 GRAM(S): 4; .5 INJECTION, POWDER, LYOPHILIZED, FOR SOLUTION INTRAVENOUS at 18:25

## 2018-04-21 RX ADMIN — PIPERACILLIN AND TAZOBACTAM 25 GRAM(S): 4; .5 INJECTION, POWDER, LYOPHILIZED, FOR SOLUTION INTRAVENOUS at 05:11

## 2018-04-21 RX ADMIN — HEPARIN SODIUM 5000 UNIT(S): 5000 INJECTION INTRAVENOUS; SUBCUTANEOUS at 21:22

## 2018-04-21 NOTE — H&P ADULT - NSHPSOCIALHISTORY_GEN_ALL_CORE
Social History:    Marital Status:  (   )    (   ) Single    (   )    (  )   Occupation:   Lives with: (  ) alone  (  ) children   (  ) spouse   (  ) parents  (  ) other    Substance Use (street drugs): (  ) never used  (  ) other:  Tobacco Usage:  (   ) never smoked   (   ) former smoker   (   ) current smoker  (     ) pack year  (        ) last cigarette date  Alcohol Usage:  Sexual History: Social History:    Marital Status:  (X)    (   ) Single    (   )    (  )   Occupation: unknown  Lives with: (  ) alone  (  ) children   (X) spouse   (  ) parents  (  ) other Social History:    Marital Status:  (X)    (   ) Single    (   )    (  )   Occupation: Retired .  Lives with: (  ) alone  (  ) children   (X) spouse   (  ) parents  (  ) other  Denies hx of tobacco or alcohol or illicit drug use. States his hobby is "reading the newspaper".

## 2018-04-21 NOTE — PROGRESS NOTE ADULT - PROBLEM SELECTOR PLAN 3
-acute encephalopathy most likely 2/2 uremic encephalopathy, though must r/o other etiologies are progressive decline, concerning of dementia vs infectious etiology vs. nutritional deficiencies vs intracranial pathology (malignancy), and patient had recent fall  - CT head w/out e/o hemorrhage or mass, although concerning for NPH  -B12, folate, TSH, RPR, HIV pending

## 2018-04-21 NOTE — H&P ADULT - ASSESSMENT
85 y/o Male w/ no known pmhx presenting w/ 85 y/o Male w/ no known pmhx presenting s/p fall, found to be hyperkalemic, victoria 2/ 2 obstructive uropathy, acute encephalopathy most likely 2/2 to uremia and anion gap metabolic acidosis 2/2 uremia. 85 y/o Male w/ no known pmhx presenting s/p fall, found to be hyperkalemic, victoria 2/ 2 obstructive uropathy, acute encephalopathy most likely 2/2 to uremia, anion gap metabolic acidosis 2/2 uremia and sepsis 2/2 pna. 85 y/o cachectic Male w/ no known pmhx presenting s/p fall, found to be hyperkalemic, victoria 2/ 2 obstructive uropathy, acute encephalopathy most likely 2/2 to uremia, anion gap metabolic acidosis 2/2 uremia and sepsis 2/2 pna.

## 2018-04-21 NOTE — H&P ADULT - NSHPPHYSICALEXAM_GEN_ALL_CORE
Vital Signs Last 24 Hrs  T(C): 36.7 (20 Apr 2018 21:33), Max: 36.7 (20 Apr 2018 15:10)  T(F): 98.1 (20 Apr 2018 21:33), Max: 98.1 (20 Apr 2018 21:33)  HR: 101 (20 Apr 2018 21:33) (78 - 106)  BP: 130/80 (20 Apr 2018 21:33) (130/80 - 146/87)  BP(mean): --  RR: 16 (20 Apr 2018 21:33) (16 - 22)  SpO2: 98% (20 Apr 2018 21:33) (98% - 100%)  PHYSICAL EXAM:  GENERAL: NAD, well-groomed, well-developed  HEAD:  Atraumatic, Normocephalic  EYES: EOMI, PERRLA, conjunctiva and sclera clear  ENMT: No tonsillar erythema, exudates, or enlargement; Moist mucous membranes, Good dentition, No lesions  NECK: Supple, No JVD, Normal thyroid  CHEST/LUNG: Clear to percussion bilaterally; No rales, rhonchi, wheezing, or rubs  HEART: Regular rate and rhythm; No murmurs, rubs, or gallops  ABDOMEN: Soft, Nontender, Nondistended; Bowel sounds present  EXTREMITIES:  2+ Peripheral Pulses, No clubbing, cyanosis, or edema  LYMPH: No lymphadenopathy noted  SKIN: No rashes or lesions  NERVOUS SYSTEM:  Alert & Oriented X3, Good concentration; Motor Strength 5/5 B/L upper and lower extremities; DTRs 2+ intact and symmetric Vital Signs Last 24 Hrs  T(C): 36.7 (20 Apr 2018 21:33), Max: 36.7 (20 Apr 2018 15:10)  T(F): 98.1 (20 Apr 2018 21:33), Max: 98.1 (20 Apr 2018 21:33)  HR: 101 (20 Apr 2018 21:33) (78 - 106)  BP: 130/80 (20 Apr 2018 21:33) (130/80 - 146/87)  BP(mean): --  RR: 16 (20 Apr 2018 21:33) (16 - 22)  SpO2: 98% (20 Apr 2018 21:33) (98% - 100%)  PHYSICAL EXAM:  GENERAL: disheveled appearance, cachetic, frail, patient hicupping  HEAD:  Atraumatic, Normocephalic  EYES: conjunctiva and sclera clear  ENMT: dry mucous membranes  NECK: Supple  CHEST/LUNG: Pectus excavatum, clear to ausculatation anteriorly  HEART: Regular rate and rhythm; No murmurs, rubs, or gallops  ABDOMEN: Soft, Nontender, Nondistended; Bowel sounds present  EXTREMITIES:  2+ Peripheral Pulses, +3 edema in RLE, 2+ edema in LLE  LYMPH: No lymphadenopathy noted  SKIN: No rashes or lesions  NERVOUS SYSTEM:  Alert & Oriented X 1 (to person) Vital Signs Last 24 Hrs  T(C): 36.7 (20 Apr 2018 21:33), Max: 36.7 (20 Apr 2018 15:10)  T(F): 98.1 (20 Apr 2018 21:33), Max: 98.1 (20 Apr 2018 21:33)  HR: 101 (20 Apr 2018 21:33) (78 - 106)  BP: 130/80 (20 Apr 2018 21:33) (130/80 - 146/87)  BP(mean): --  RR: 16 (20 Apr 2018 21:33) (16 - 22)  SpO2: 98% (20 Apr 2018 21:33) (98% - 100%)  PHYSICAL EXAM:  GENERAL: disheveled appearance, cachetic, frail, patient hicupping  HEAD:  Atraumatic, Normocephalic  EYES: conjunctiva and sclera clear  ENMT: dry mucous membranes. edentulous except 1 tooth.  NECK: Supple  CHEST/LUNG: Pectus excavatum, clear to ausculatation anteriorly  HEART: Regular rate and rhythm; No murmurs, rubs, or gallops  ABDOMEN: Soft, Nontender, Nondistended; Bowel sounds present  EXTREMITIES:  2+ Peripheral Pulses, +2 edema in RLE, 1+ edema in LLE  LYMPH: No lymphadenopathy noted  SKIN: No rashes or lesion except sclerosis of LE likely 2/2 chronic edema.  NERVOUS SYSTEM:  Alert & Oriented X 1 (to person). States year is 1935.

## 2018-04-21 NOTE — PROGRESS NOTE ADULT - PROBLEM SELECTOR PLAN 7
-as per ED documentation and MICU documentation, patient present for mechanical fall  -no overt bruises/echhymoses, hematomas noted on physical exam, patient able to move all four extremities, w/ good strength in upper and lower extremities, w/ no c/o of pain  - PT eval

## 2018-04-21 NOTE — PROGRESS NOTE ADULT - ATTENDING COMMENTS
Patient seen and examined.   85 y/o cachectic Male w/ no known pmhx admitted with acute metabolic encephalopathy, sepsis, MATTHEW and Pneumonia.  On cat iv Ceftriaxone and Zithromax. Follow up cultures.  Reviewed CT head no bleed/ cva. Patient seen and examined.   85 y/o cachectic Male w/ no known pmhx admitted with acute metabolic encephalopathy, sepsis, MATTHEW and Pneumonia.  On cat, iv Zosyn. Follow up cultures.  Reviewed CT head no bleed/ cva.

## 2018-04-21 NOTE — H&P ADULT - NSHPREVIEWOFSYSTEMS_GEN_ALL_CORE
REVIEW OF SYSTEMS:  CONSTITUTIONAL: No fever, weight loss, or fatigue  EYES: No eye pain, visual disturbances, or discharge  ENMT:  No difficulty hearing, tinnitus, vertigo; No sinus or throat pain  NECK: No pain or stiffness  BREASTS: No pain, masses, or nipple discharge  RESPIRATORY: No cough, wheezing, chills or hemoptysis; No shortness of breath  CARDIOVASCULAR: No chest pain, palpitations, dizziness, or leg swelling  GASTROINTESTINAL: No abdominal or epigastric pain. No nausea, vomiting, or hematemesis; No diarrhea or constipation. No melena or hematochezia.  GENITOURINARY: No dysuria, frequency, hematuria, or incontinence  NEUROLOGICAL: No headaches, memory loss, loss of strength, numbness, or tremors  SKIN: No itching, burning, rashes, or lesions   LYMPH NODES: No enlarged glands  ENDOCRINE: No heat or cold intolerance; No hair loss  MUSCULOSKELETAL: No joint pain or swelling; No muscle, back, or extremity pain  PSYCHIATRIC: No depression, anxiety, mood swings, or difficulty sleeping  HEME/LYMPH: No easy bruising, or bleeding gums  ALLERY AND IMMUNOLOGIC: No hives or eczema unable to obtain ROS as patient acutely encephalopathic unable to obtain full ROS as patient acutely encephalopathic. Denies pain anywhere. States he is able to ambulate with walker. States he eats well with good appetite.

## 2018-04-21 NOTE — PROGRESS NOTE ADULT - SUBJECTIVE AND OBJECTIVE BOX
Patient is a 86y old  Male who presents with a chief complaint of fall, decreased po intake      HPI: 86y y/o cachectic M w/ no PMHx/poor medical followup (has not seen a doctor for years) initially presents for a mechanical fall w/ no reported LOC as reported by ED documentation. HCP appears to be a friend, noted to ED that patient has had decrease po intake, gait instability, and had had progressive decline in mental status. Has not been seen by physician in years. Pt is confused and could not provide history.  Collateral information from HCP revealed patient has decreased urination for several months, with mental status decline, has very poor oral intake. In the past was an active person, former , , and  personnel.   ED vitals: temp: 97.5, HR 78-->101, /86, RR 16, 98% RA  In the ED: given: azithromycin 500mg x 1, calcium gluconate x 1, ceftriaxone x1, humilin 10 units x 1, sodium bicarb 50mg IV x 1, D50 x 1, NS x 1L    PMHx/PSHx:   PAST MEDICAL & SURGICAL HISTORY:  No pertinent past medical history  No significant past surgical history      Social Hx: Lives with HCP (female friend), prior to deconditioning was ambulating independently. Ate meals on wheels    Allergies: Allergies    No Known Allergies    Intolerances    Medications Standing   MEDICATIONS  (STANDING):  heparin  Injectable 5000 Unit(s) SubCutaneous every 8 hours  piperacillin/tazobactam IVPB. 3.375 Gram(s) IV Intermittent every 12 hours  tamsulosin 0.4 milliGRAM(s) Oral at bedtime      Medications PRN   MEDICATIONS  (PRN):      PHYSICAL EXAM:  	GENERAL: disheveled appearance, cachetic, frail, patient hicupping  	HEAD:  Atraumatic, Normocephalic  	EYES: conjunctiva and sclera clear  	ENMT: dry mucous membranes. edentulous except 1 tooth.  	NECK: Supple  	CHEST/LUNG: Pectus excavatum, clear to auscultation anteriorly  	HEART: Regular rate and rhythm; No murmurs, rubs, or gallops  	ABDOMEN: Soft, Nontender, Nondistended; Bowel sounds present  	EXTREMITIES:  2+ Peripheral Pulses, +2 edema in RLE, 1+ edema in LLE  	LYMPH: No lymphadenopathy noted  	SKIN: No rashes or lesion except sclerosis of LE likely 2/2 chronic edema.  NERVOUS SYSTEM:  Alert & Oriented X 1 (to person). States year is 1935.    LABS   CBC                       11.9   16.8  )-----------( 201      ( 21 Apr 2018 04:01 )             35.9     CMP 04-21    142  |  101  |  100<H>  ----------------------------<  127<H>  4.9   |  24  |  4.30<H>    Ca    8.8      21 Apr 2018 05:24  Phos  4.4     04-21  Mg     2.6     04-21    TPro  7.2  /  Alb  3.5  /  TBili  0.5  /  DBili  x   /  AST  8<L>  /  ALT  12  /  AlkPhos  93  04-21    PT/INR - ( 20 Apr 2018 20:07 )   PT: 12.3 sec;   INR: 1.13 ratio         PTT - ( 21 Apr 2018 04:01 )  PTT:26.8 sec

## 2018-04-21 NOTE — H&P ADULT - PROBLEM SELECTOR PLAN 3
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 nutritional deficiencies vs intracranial pathology (malignancy), and patient had recent fall, will r/o bleed  -will obtain CTH no contrast  -patient does not improve mental status w/ improving uremia, consider neuro consult  -B12, folate, TSH, RPR, HIV -acute encephalopathy most likely 2/2 uremic encephalopathy, though must r/o other etiologies as wife states patient has been on progressive decline, concerning of dementia vs infectious etiology vs. nutritional deficiencies vs intracranial pathology (malignancy), and patient had recent fall, will r/o bleed  -will obtain CTH no contrast  -will need to discuss w/ renal regarding dialysis if patient urine output decreases, as it is indication for urgent HD  -neuro consult if CTH shows signs of brain atrophy, possible signs of progressive dementia  -B12, folate, TSH, RPR, HIV -acute encephalopathy most likely 2/2 uremic encephalopathy, though must r/o other etiologies as wife states patient has been on progressive decline, concerning of dementia vs infectious etiology vs. nutritional deficiencies vs intracranial pathology (malignancy), and patient had recent fall, will r/o bleed  -will obtain CTH no contrast  -will need to discuss w/ renal regarding dialysis if patient urine output decreases, as it is indication for urgent HD  -neuro consult if CTH shows signs of brain atrophy, possible signs of progressive dementia  -B12, folate, TSH, RPR, HIV, urine tox  ***PLEASE CALL FAMILY IN AM TO OBTAIN COLLATERAL INFO FROM WIFE, unable to be reach currently -acute encephalopathy most likely 2/2 uremic encephalopathy, though must r/o other etiologies as wife states patient has been on progressive decline, concerning of dementia vs infectious etiology vs. nutritional deficiencies vs intracranial pathology (malignancy), and patient had recent fall, will r/o bleed  -will obtain CTH no contrast  -will need to discuss w/ renal regarding dialysis if patient urine output decreases, as it is indication for urgent HD  -CTH on my read showing some degree atrophy. f/u official CTH report  -B12, folate, TSH, RPR, HIV, urine tox  ***PLEASE CALL FAMILY IN AM TO OBTAIN COLLATERAL INFO FROM WIFE, unable to be reach currently

## 2018-04-21 NOTE — H&P ADULT - PROBLEM SELECTOR PLAN 4
-AG 24 on admission, most likely in setting of uremia ( on admission)  -currently patient placed on bicarb gtt, followed by renal  -patient presented w/ [facility] most likely 2/2 to obstructive uropathy, w/ cat placement, s/p 1.7 Liters  -currently not candidate for dialysis as K+ improving on bicarb gtt and patient maintaining urine output, will continue to monitor BMP and patient's clinical symptoms, as patient currently being treated for obstructive uropathy w/ cat placement  -BMP ordered now to monitor electrolytes -AG 24 on admission, most likely in setting of uremia ( on admission)  -currently patient placed on bicarb gtt, followed by renal  -patient presented w/ [facility] most likely 2/2 to obstructive uropathy, w/ cat placement, s/p 1.7 Liters  -currently not candidate for dialysis as per renal as K+ improving on bicarb gtt and patient maintaining urine output, will continue to monitor BMP and patient's clinical symptoms, as patient currently being treated for obstructive uropathy w/ cat placement  -patient does have appear to have uremic encephalopathy w/ associated hiccups, will need to d/w w/ renal regarding dialysis for uremia  -BMP ordered now to monitor electrolytes -AG 24 on admission, most likely in setting of uremia ( on admission)  - followed by renal  -patient presented w/ [facility] most likely 2/2 to obstructive uropathy, w/ cat placement, s/p 1.7 Liters  -currently not candidate for dialysis as per renal as K+ improving on bicarb gtt and patient maintaining urine output, will continue to monitor BMP and patient's clinical symptoms, as patient currently being treated for obstructive uropathy w/ cat placement  -patient does have appear to have uremic encephalopathy w/ associated hiccups, will need to d/w w/ renal regarding dialysis for uremia  -BMP ordered now to monitor electrolytes  -vbg, BMP q8 to monitor electrolytes and bicarb level

## 2018-04-21 NOTE — PROGRESS NOTE ADULT - PROBLEM SELECTOR PLAN 4
-AG 24 on admission, most likely in setting of uremia ( on admission)  - followed by renal  -patient presented w/ [facility] most likely 2/2 to obstructive uropathy, w/ cat placement, s/p 1.7 Liters  -currently not candidate for dialysis as per renal as K+ improved on bicarb gtt and patient maintaining urine output, will continue to monitor BMP and patient's clinical symptoms, as patient currently being treated for obstructive uropathy w/ cat placement

## 2018-04-21 NOTE — H&P ADULT - PROBLEM SELECTOR PLAN 1
-K+8.4 on admission, EKG w/ no evidence of peaked T waves  -hyperkalemia most likely 2/2 MATTHEW which is 2/2 obstructive uropathy (seen on CT A/P) now s/p cat placement  -patient s/p calcium gluconate x 1g, humilin 10 units x1, sodium bicarb 50mg x 1, dextrose 50mg x 1  -repeat K+ 5.4, currently on bicarb gtt @ 100cc's/hr  -continue w/ telemetry  -seen by nephrology and MICU and currently no indication for urgent HD or ICU level of care, hyperkalemia trending down, w/ good urine output, will continue w/ bicarb gtt -K+8.4 on admission, EKG w/ no evidence of peaked T waves  -hyperkalemia most likely 2/2 MATTHEW which is 2/2 obstructive uropathy (seen on CT A/P) now s/p cat placement  -patient s/p calcium gluconate x 1g, humilin 10 units x1, sodium bicarb 50mg x 1, dextrose 50mg x 1  -repeat K+ 5.4, currently on bicarb gtt @ 100cc's/hr  -continue w/ telemetry  -seen by nephrology and MICU and currently no indication for urgent HD or ICU level of care, hyperkalemia trending down, w/ good urine output, will continue w/ bicarb gtt  -will give kaexylate  -vbg, BMP q8 to monitor electrolytes and bicarb level

## 2018-04-21 NOTE — PROGRESS NOTE ADULT - PROBLEM SELECTOR PLAN 10
-HSQ in setting of victoria, avoid lovenox  Diet: Dysphagia diet  Susan Palma PGY-1  Spectre [id number]  Pager # [phone]/ [phone]

## 2018-04-21 NOTE — H&P ADULT - NSHPLABSRESULTS_GEN_ALL_CORE
Personally reviewed labs, imaging, ekg by me      140  |  99  |  118<H>  ----------------------------<  95  5.4<H>   |  19<L>  |  6.16<H>      137  |  94<L>  |  131<H>  ----------------------------<  129<H>  6.7<HH>   |  20<L>  |  7.44<H>      135  |  94<L>  |  129<H>  ----------------------------<  115<H>  8.4<HH>   |  17<L>  |  7.08<H>    Ca    9.0      2018 21:16  Ca    9.2      2018 17:41  Ca    9.0      2018 16:07    TPro  7.7  /  Alb  3.4  /  TBili  0.5  /  DBili  x   /  AST  58<H>  /  ALT  13  /  AlkPhos  91        PT/INR - ( 2018 20:07 )   PT: 12.3 sec;   INR: 1.13 ratio         PTT - ( 2018 20:07 )  PTT:26.9 sec              Urinalysis Basic - ( 2018 18:29 )    Color: Yellow / Appearance: Clear / S.016 / pH: x  Gluc: x / Ketone: Negative  / Bili: Negative / Urobili: Negative   Blood: x / Protein: Trace / Nitrite: Negative   Leuk Esterase: Negative / RBC: 10-25 /HPF / WBC 3-5 /HPF   Sq Epi: x / Non Sq Epi: x / Bacteria: x                              13.4   20.1  )-----------( 194      ( 2018 16:07 )             40.0     CAPILLARY BLOOD GLUCOSE      POCT Blood Glucose.: 232 mg/dL (2018 20:16)  POCT Blood Glucose.: 233 mg/dL (2018 19:27)    Blood Gas Source Venous: Venous ( @ 17:40)  < from: CT Abdomen and Pelvis No Cont (18 @ 18:17) >    LUNGS AND LARGE AIRWAYS: Patent central airways. Consolidative opacity in   the right lower lobe. Scattered tree-in-bud nodules in the right middle   and lower lobes. A 7 mm nodule in the right middle lobe (2:69).  PLEURA: Small right pleural effusion.  VESSELS: Mild atherosclerotic calcifications of thethoracic aorta.  HEART: Heart size is normal. No pericardial effusion. Aortic valve and   coronary artery calcifications.  MEDIASTINUM AND JYOTHI: No lymphadenopathy.  CHEST WALL AND LOWER NECK: Within normal limits.     ABDOMEN AND PELVIS:    LIVER: Within normal limits.  BILE DUCTS: Normal caliber.  GALLBLADDER: Cholecystectomy.  SPLEEN: Within normal limits.  PANCREAS: Within normal limits.  ADRENALS: Within normal limits.  KIDNEYS/URETERS: Mild bilateral hydroureteronephrosis without an   obstructing renal calculus. Nonobstructing right 4 mm calculus and   additional nonobstructing left 6-7 mm calculi. Left renal cyst.    BLADDER: Distended.  REPRODUCTIVE ORGANS: The prostate is enlarged.    BOWEL/ABDOMINAL WALL:  Large left inguinal herniacontaining   nonobstructing loop of descending colon and free fluid. No bowel   obstruction. Appendix is not identified.  PERITONEUM: No intraperitoneal free air.  VESSELS:  Atherosclerotic calcification of the abdominal aorta.  RETROPERITONEUM: No lymphadenopathy.    BONES: Degenerative changes of the spine. Old right rib fracture.    IMPRESSION:     Right lower lobe pneumonia. Small right pleural effusion.    Mild bilateral hydroureteronephrosis and distended urinary bladder.    ERPRETATION:  Hazy opacity in the right lower lung may be secondary to   pneumonia in the appropriate clinical setting.    < end of copied text >    EKG: Personally reviewed labs, imaging, ekg by me      140  |  99  |  118<H>  ----------------------------<  95  5.4<H>   |  19<L>  |  6.16<H>      137  |  94<L>  |  131<H>  ----------------------------<  129<H>  6.7<HH>   |  20<L>  |  7.44<H>      135  |  94<L>  |  129<H>  ----------------------------<  115<H>  8.4<HH>   |  17<L>  |  7.08<H>    Ca    9.0      2018 21:16  Ca    9.2      2018 17:41  Ca    9.0      2018 16:07    TPro  7.7  /  Alb  3.4  /  TBili  0.5  /  DBili  x   /  AST  58<H>  /  ALT  13  /  AlkPhos  91        PT/INR - ( 2018 20:07 )   PT: 12.3 sec;   INR: 1.13 ratio         PTT - ( 2018 20:07 )  PTT:26.9 sec              Urinalysis Basic - ( 2018 18:29 )    Color: Yellow / Appearance: Clear / S.016 / pH: x  Gluc: x / Ketone: Negative  / Bili: Negative / Urobili: Negative   Blood: x / Protein: Trace / Nitrite: Negative   Leuk Esterase: Negative / RBC: 10-25 /HPF / WBC 3-5 /HPF   Sq Epi: x / Non Sq Epi: x / Bacteria: x                              13.4   20.1  )-----------( 194      ( 2018 16:07 )             40.0     CAPILLARY BLOOD GLUCOSE      POCT Blood Glucose.: 232 mg/dL (2018 20:16)  POCT Blood Glucose.: 233 mg/dL (2018 19:27)    Blood Gas Source Venous: Venous ( @ 17:40)  < from: CT Abdomen and Pelvis No Cont (18 @ 18:17) >    LUNGS AND LARGE AIRWAYS: Patent central airways. Consolidative opacity in   the right lower lobe. Scattered tree-in-bud nodules in the right middle   and lower lobes. A 7 mm nodule in the right middle lobe (2:69).  PLEURA: Small right pleural effusion.  VESSELS: Mild atherosclerotic calcifications of thethoracic aorta.  HEART: Heart size is normal. No pericardial effusion. Aortic valve and   coronary artery calcifications.  MEDIASTINUM AND JYOTHI: No lymphadenopathy.  CHEST WALL AND LOWER NECK: Within normal limits.     ABDOMEN AND PELVIS:    LIVER: Within normal limits.  BILE DUCTS: Normal caliber.  GALLBLADDER: Cholecystectomy.  SPLEEN: Within normal limits.  PANCREAS: Within normal limits.  ADRENALS: Within normal limits.  KIDNEYS/URETERS: Mild bilateral hydroureteronephrosis without an   obstructing renal calculus. Nonobstructing right 4 mm calculus and   additional nonobstructing left 6-7 mm calculi. Left renal cyst.    BLADDER: Distended.  REPRODUCTIVE ORGANS: The prostate is enlarged.    BOWEL/ABDOMINAL WALL:  Large left inguinal herniacontaining   nonobstructing loop of descending colon and free fluid. No bowel   obstruction. Appendix is not identified.  PERITONEUM: No intraperitoneal free air.  VESSELS:  Atherosclerotic calcification of the abdominal aorta.  RETROPERITONEUM: No lymphadenopathy.    BONES: Degenerative changes of the spine. Old right rib fracture.    IMPRESSION:     Right lower lobe pneumonia. Small right pleural effusion.    Mild bilateral hydroureteronephrosis and distended urinary bladder.    ERPRETATION:  Hazy opacity in the right lower lung may be secondary to   pneumonia in the appropriate clinical setting.    < end of copied text >    EKG: Sinus Tachy , ,  Personally reviewed labs, imaging, ekg by me      140  |  99  |  118<H>  ----------------------------<  95  5.4<H>   |  19<L>  |  6.16<H>      137  |  94<L>  |  131<H>  ----------------------------<  129<H>  6.7<HH>   |  20<L>  |  7.44<H>      135  |  94<L>  |  129<H>  ----------------------------<  115<H>  8.4<HH>   |  17<L>  |  7.08<H>    Ca    9.0      2018 21:16  Ca    9.2      2018 17:41  Ca    9.0      2018 16:07    TPro  7.7  /  Alb  3.4  /  TBili  0.5  /  DBili  x   /  AST  58<H>  /  ALT  13  /  AlkPhos  91        PT/INR - ( 2018 20:07 )   PT: 12.3 sec;   INR: 1.13 ratio         PTT - ( 2018 20:07 )  PTT:26.9 sec              Urinalysis Basic - ( 2018 18:29 )    Color: Yellow / Appearance: Clear / S.016 / pH: x  Gluc: x / Ketone: Negative  / Bili: Negative / Urobili: Negative   Blood: x / Protein: Trace / Nitrite: Negative   Leuk Esterase: Negative / RBC: 10-25 /HPF / WBC 3-5 /HPF   Sq Epi: x / Non Sq Epi: x / Bacteria: x                              13.4   20.1  )-----------( 194      ( 2018 16:07 )             40.0     CAPILLARY BLOOD GLUCOSE      POCT Blood Glucose.: 232 mg/dL (2018 20:16)  POCT Blood Glucose.: 233 mg/dL (2018 19:27)    Blood Gas Source Venous: Venous ( @ 17:40)  < from: CT Abdomen and Pelvis No Cont (18 @ 18:17) >    LUNGS AND LARGE AIRWAYS: Patent central airways. Consolidative opacity in   the right lower lobe. Scattered tree-in-bud nodules in the right middle   and lower lobes. A 7 mm nodule in the right middle lobe (2:69).  PLEURA: Small right pleural effusion.  VESSELS: Mild atherosclerotic calcifications of thethoracic aorta.  HEART: Heart size is normal. No pericardial effusion. Aortic valve and   coronary artery calcifications.  MEDIASTINUM AND JYOTHI: No lymphadenopathy.  CHEST WALL AND LOWER NECK: Within normal limits.     ABDOMEN AND PELVIS:    LIVER: Within normal limits.  BILE DUCTS: Normal caliber.  GALLBLADDER: Cholecystectomy.  SPLEEN: Within normal limits.  PANCREAS: Within normal limits.  ADRENALS: Within normal limits.  KIDNEYS/URETERS: Mild bilateral hydroureteronephrosis without an   obstructing renal calculus. Nonobstructing right 4 mm calculus and   additional nonobstructing left 6-7 mm calculi. Left renal cyst.    BLADDER: Distended.  REPRODUCTIVE ORGANS: The prostate is enlarged.    BOWEL/ABDOMINAL WALL:  Large left inguinal herniacontaining   nonobstructing loop of descending colon and free fluid. No bowel   obstruction. Appendix is not identified.  PERITONEUM: No intraperitoneal free air.  VESSELS:  Atherosclerotic calcification of the abdominal aorta.  RETROPERITONEUM: No lymphadenopathy.    BONES: Degenerative changes of the spine. Old right rib fracture.    IMPRESSION:     Right lower lobe pneumonia. Small right pleural effusion.    Mild bilateral hydroureteronephrosis and distended urinary bladder.    ERPRETATION:  Hazy opacity in the right lower lung may be secondary to   pneumonia in the appropriate clinical setting.    < end of copied text >    EKG: Sinus Tachy , , . Peaked T wave in V2-V3.

## 2018-04-21 NOTE — PROGRESS NOTE ADULT - PROBLEM SELECTOR PLAN 9
-PT: recent fall  -wound care consult: stage I ulcer  -nutrition c/s: patient appears malnutritioned, cachetic  -s/w c/s: patient's HCP states it is difficult for her to care for him

## 2018-04-21 NOTE — H&P ADULT - PROBLEM SELECTOR PLAN 2
Cr. 7.08, unknown baseline, patient admitted w/ victoria most likely 2/2 bilateral hydronephrosis w/ large distended bladder seen on CT imaging  -now s/p cat placement w/ 1.7 liters output  -Cr. improving w/ serial BMPs, will continue to monitor urine output, currently no indication for HD Cr. 7.08, unknown baseline, patient admitted w/ victoria most likely 2/2 bilateral hydronephrosis w/ large distended bladder seen on CT imaging  -now s/p cat placement w/ 1.7 liters output  -Cr. improving w/ serial BMPs, will continue to monitor urine output, currently no indication for HD  -will obtain urine lytes

## 2018-04-21 NOTE — PROGRESS NOTE ADULT - PROBLEM SELECTOR PLAN 5
-Patient has WBC 20.1,  meets sepsis criteria-with potential source pna, most likely aspiration pna as CXR shows RLL opacity and patient has poor dentition, will need to obtain more collateral from wife when able to reach, patient currently HDS  -s/p ceftriaxone x 1g, azithromycin 500mg x 1 for presumed CAP  -will change abx to zosyn for broader coverage as it can be CAP vs aspiration pna  -Blood cultures pending, urine culture pending, will order legionella urine antigen  -trending fever curve, cbc qd

## 2018-04-21 NOTE — H&P ADULT - PROBLEM SELECTOR PLAN 7
-as per ED documentation and MICU documentation, patient present for mechanical fall  -no overt bruises/echhymoses, hematomas noted on physical exam, patient able to move all four extremities, w/ good strength in upper and lower extremities, w/ no c/o of pain  -will obtain CTH to r/o hemorrhage/trauma, CTA/P and CT chest show no acute trauma, no acute rib fractures -as per ED documentation and MICU documentation, patient present for mechanical fall  -no overt bruises/echhymoses, hematomas noted on physical exam, patient able to move all four extremities, w/ good strength in upper and lower extremities, w/ no c/o of pain  -will obtain CTH to r/o hemorrhage/trauma, CTA/P and CT chest show no acute trauma, no acute rib fractures  - PT eval

## 2018-04-21 NOTE — H&P ADULT - HISTORY OF PRESENT ILLNESS
87 y/o Male w/ no known pmhx presenting w/           ED vitals: temp: 97.5, HR 78-->101, /86, RR 16, 98% RA  In the ED: given: azithromycin 500mg x 1, calcium gluconate x 1, ceftriaxone x1, humilin 10 units x 1, sodium bicarb 50mg IV x 1, D50 x 1, NS x 1L History unable to obtain as patient acutely encephalopathic. Wife called by phone multiple times, no response. Documentation from ED and MICU.    86y y/o M w/ no PMHx (has not seen a doctor for years) initially presents for a mechanical fall w/ no reported LOC as reported by ED documentation. Wife noted to ED that patient has had decrease po intake, gait instability, and had had progressive decline in mental status. Has not been seen by physician in years.     ED vitals: temp: 97.5, HR 78-->101, /86, RR 16, 98% RA  In the ED: given: azithromycin 500mg x 1, calcium gluconate x 1, ceftriaxone x1, humilin 10 units x 1, sodium bicarb 50mg IV x 1, D50 x 1, NS x 1L History unable to obtain as patient acutely encephalopathic. Wife called by phone multiple times, no response. Documentation from ED and MICU.    86y y/o cachectic M w/ no PMHx/poor medical followup (has not seen a doctor for years) initially presents for a mechanical fall w/ no reported LOC as reported by ED documentation. Wife noted to ED that patient has had decrease po intake, gait instability, and had had progressive decline in mental status. Has not been seen by physician in years. Pt is confused and could not provide history.    ED vitals: temp: 97.5, HR 78-->101, /86, RR 16, 98% RA  In the ED: given: azithromycin 500mg x 1, calcium gluconate x 1, ceftriaxone x1, humilin 10 units x 1, sodium bicarb 50mg IV x 1, D50 x 1, NS x 1L

## 2018-04-21 NOTE — H&P ADULT - PROBLEM SELECTOR PLAN 8
-PT -PT: recent fall  -wound care consult: stage I ulcer  -nutrition c/s: patient appears malnutritioned, cachetic  -s/w c/s: patient's wife states it is difficult for her to care for him - duplex and TTE pending

## 2018-04-21 NOTE — PROGRESS NOTE ADULT - ASSESSMENT
85 y/o cachectic Male w/ no known pmhx presenting s/p fall, found to be hyperkalemic, victoria 2/ 2 obstructive uropathy, acute encephalopathy most likely 2/2 to uremia, anion gap metabolic acidosis 2/2 uremia and sepsis 2/2 pna.

## 2018-04-21 NOTE — PROGRESS NOTE ADULT - PROBLEM SELECTOR PLAN 2
Cr. 7.08, unknown baseline, patient admitted w/ victoria most likely 2/2 bilateral hydronephrosis w/ large distended bladder seen on CT imaging  -now s/p cat placement w/ 1.7 liters output  -Cr. improving w/ serial BMPs, will continue to monitor urine output, currently no indication for HD  -will obtain urine lytes

## 2018-04-21 NOTE — H&P ADULT - PROBLEM SELECTOR PLAN 6
-Patient has WBC 20.1,  meets sepsis criteria-with potential source pna, currently HDS  -s/p ceftriaxone x 1g, azithromycin 500mg x 1 for presumed CAP  -will change abx to zosyn for broader coverage as it can be CAP vs aspiration pna  -Blood cultures pending, urine culture pending  -trending fever curve, cbc qd -Patient has WBC 20.1,  meets sepsis criteria-with potential source pna, most likely aspiration pna as CXR shows RLL opacity and patient has poor dentition, will need to obtain more collateral from wife when able to reach, patient currently HDS  -s/p ceftriaxone x 1g, azithromycin 500mg x 1 for presumed CAP  -will change abx to zosyn for broader coverage as it can be CAP vs aspiration pna  -Blood cultures pending, urine culture pending, will order legionella urine antigen  -trending fever curve, cbc qd

## 2018-04-21 NOTE — PROGRESS NOTE ADULT - PROBLEM SELECTOR PLAN 1
RESOLVED  -K+8.4 on admission, EKG w/ no evidence of peaked T waves  -hyperkalemia most likely 2/2 MATTHEW which is 2/2 obstructive uropathy (seen on CT A/P) now s/p cat placement  -patient s/p calcium gluconate x 1g, humilin 10 units x1, sodium bicarb 50mg x 1, dextrose 50mg x 1, kayexelate x 1  - discontinue bicarb gtt as per nephro  -continue w/ telemetry  -seen by nephrology and MICU and currently no indication for urgent HD or ICU level of care, hyperkalemia trending down, w/ good urine output

## 2018-04-21 NOTE — H&P ADULT - PROBLEM SELECTOR PLAN 9
-HSQ in setting of victoria, avoid lovenox -PT: recent fall  -wound care consult: stage I ulcer  -nutrition c/s: patient appears malnutritioned, cachetic  -s/w c/s: patient's wife states it is difficult for her to care for him

## 2018-04-22 DIAGNOSIS — E87.0 HYPEROSMOLALITY AND HYPERNATREMIA: ICD-10-CM

## 2018-04-22 LAB
ANION GAP SERPL CALC-SCNC: 16 MMOL/L — SIGNIFICANT CHANGE UP (ref 5–17)
BUN SERPL-MCNC: 64 MG/DL — HIGH (ref 7–23)
CALCIUM SERPL-MCNC: 9.1 MG/DL — SIGNIFICANT CHANGE UP (ref 8.4–10.5)
CHLORIDE SERPL-SCNC: 107 MMOL/L — SIGNIFICANT CHANGE UP (ref 96–108)
CO2 SERPL-SCNC: 29 MMOL/L — SIGNIFICANT CHANGE UP (ref 22–31)
CREAT SERPL-MCNC: 1.76 MG/DL — HIGH (ref 0.5–1.3)
GLUCOSE SERPL-MCNC: 120 MG/DL — HIGH (ref 70–99)
HCT VFR BLD CALC: 35.9 % — LOW (ref 39–50)
HGB BLD-MCNC: 11.9 G/DL — LOW (ref 13–17)
LACTATE BLDV-MCNC: 2.8 MMOL/L — HIGH (ref 0.7–2)
MAGNESIUM SERPL-MCNC: 2.5 MG/DL — SIGNIFICANT CHANGE UP (ref 1.6–2.6)
MCHC RBC-ENTMCNC: 31 PG — SIGNIFICANT CHANGE UP (ref 27–34)
MCHC RBC-ENTMCNC: 33.1 GM/DL — SIGNIFICANT CHANGE UP (ref 32–36)
MCV RBC AUTO: 93.5 FL — SIGNIFICANT CHANGE UP (ref 80–100)
PHOSPHATE SERPL-MCNC: 2.9 MG/DL — SIGNIFICANT CHANGE UP (ref 2.5–4.5)
PLATELET # BLD AUTO: 219 K/UL — SIGNIFICANT CHANGE UP (ref 150–400)
POTASSIUM SERPL-MCNC: 4.1 MMOL/L — SIGNIFICANT CHANGE UP (ref 3.5–5.3)
POTASSIUM SERPL-SCNC: 4.1 MMOL/L — SIGNIFICANT CHANGE UP (ref 3.5–5.3)
RBC # BLD: 3.84 M/UL — LOW (ref 4.2–5.8)
RBC # FLD: 12.2 % — SIGNIFICANT CHANGE UP (ref 10.3–14.5)
SODIUM SERPL-SCNC: 152 MMOL/L — HIGH (ref 135–145)
WBC # BLD: 11.9 K/UL — HIGH (ref 3.8–10.5)
WBC # FLD AUTO: 11.9 K/UL — HIGH (ref 3.8–10.5)

## 2018-04-22 PROCEDURE — 99233 SBSQ HOSP IP/OBS HIGH 50: CPT | Mod: GC

## 2018-04-22 RX ORDER — SODIUM CHLORIDE 9 MG/ML
1000 INJECTION, SOLUTION INTRAVENOUS
Qty: 0 | Refills: 0 | Status: DISCONTINUED | OUTPATIENT
Start: 2018-04-22 | End: 2018-04-24

## 2018-04-22 RX ORDER — SODIUM CHLORIDE 9 MG/ML
1000 INJECTION, SOLUTION INTRAVENOUS
Qty: 0 | Refills: 0 | Status: DISCONTINUED | OUTPATIENT
Start: 2018-04-22 | End: 2018-04-22

## 2018-04-22 RX ADMIN — SODIUM CHLORIDE 75 MILLILITER(S): 9 INJECTION, SOLUTION INTRAVENOUS at 11:38

## 2018-04-22 RX ADMIN — HEPARIN SODIUM 5000 UNIT(S): 5000 INJECTION INTRAVENOUS; SUBCUTANEOUS at 13:10

## 2018-04-22 RX ADMIN — PIPERACILLIN AND TAZOBACTAM 25 GRAM(S): 4; .5 INJECTION, POWDER, LYOPHILIZED, FOR SOLUTION INTRAVENOUS at 06:47

## 2018-04-22 RX ADMIN — HEPARIN SODIUM 5000 UNIT(S): 5000 INJECTION INTRAVENOUS; SUBCUTANEOUS at 06:22

## 2018-04-22 RX ADMIN — HEPARIN SODIUM 5000 UNIT(S): 5000 INJECTION INTRAVENOUS; SUBCUTANEOUS at 21:48

## 2018-04-22 RX ADMIN — SODIUM CHLORIDE 75 MILLILITER(S): 9 INJECTION, SOLUTION INTRAVENOUS at 21:49

## 2018-04-22 RX ADMIN — PIPERACILLIN AND TAZOBACTAM 25 GRAM(S): 4; .5 INJECTION, POWDER, LYOPHILIZED, FOR SOLUTION INTRAVENOUS at 17:10

## 2018-04-22 RX ADMIN — TAMSULOSIN HYDROCHLORIDE 0.4 MILLIGRAM(S): 0.4 CAPSULE ORAL at 21:48

## 2018-04-22 NOTE — PROGRESS NOTE ADULT - SUBJECTIVE AND OBJECTIVE BOX
Anna Ernst, PGY3   Pager: [phone]   4/22/18 7AM-12PM    Patient is a 86y old  Male who presents with a chief complaint of fall, decreased po intake    SUBJECTIVE/ OVERNIGHT EVENTS: no acute events overnight. Pt seen and examined this AM. Denies any complaints. Denies any pain or discomfort. No headaches/vision changes, no pre-syncopal symptoms, denies any coughing/URI symptoms, no CP/palpitations/SOB, no urinary complaints. No subjective fevers/chills. Remained afebrile overnight.    Tele events: sinus - sinus tach , lot of PVCs, couplets, trigeminy     MEDICATIONS  (STANDING):  heparin  Injectable 5000 Unit(s) SubCutaneous every 8 hours  piperacillin/tazobactam IVPB. 3.375 Gram(s) IV Intermittent every 12 hours  tamsulosin 0.4 milliGRAM(s) Oral at bedtime    ICU Vital Signs Last 24 Hrs  T(C): 36.8 (22 Apr 2018 05:28), Max: 37 (21 Apr 2018 17:44)  T(F): 98.3 (22 Apr 2018 05:28), Max: 98.6 (21 Apr 2018 17:44)  HR: 91 (22 Apr 2018 05:28) (50 - 91)  BP: 142/73 (22 Apr 2018 05:28) (121/60 - 145/82)  BP(mean): --  ABP: --  ABP(mean): --  RR: 18 (22 Apr 2018 05:28) (16 - 18)  SpO2: 99% (22 Apr 2018 05:28) (96% - 99%)    I&O's Summary    21 Apr 2018 07:01  -  22 Apr 2018 07:00  --------------------------------------------------------  IN: 700 mL / OUT: 1750 mL / NET: -1050 mL    PHYSICAL EXAM:  GENERAL: elderly cachectic gentleman, frail, NAD and resting comfortably   HEAD:  Atraumatic, Normocephalic  ENT: EOMI, PERRLA, conjunctiva and sclera clear, Neck supple, No JVD, dry MM   CHEST/LUNG: Pectus excavatum, Clear to auscultation bilaterally; No wheeze, equal breath sounds bilaterally   HEART: RRR, +S1/S2, no m/r/g   ABDOMEN: Soft, Nontender, Nondistended; Bowel sounds present, no organomegaly  EXTREMITIES:  No clubbing, cyanosis, or edema  PSYCH: Nl behavior, nl affect  NEUROLOGY: AAOx2(oriented to person and place but not time), non-focal, cranial nerves intact  SKIN: Normal color, No rashes or lesions        LABS                         11.9   11.9  )-----------( 219      ( 22 Apr 2018 07:18 )             35.9     22 Apr 2018 07:14    152    |  107    |  64     ----------------------------<  120    4.1     |  29     |  1.76     Ca    9.1        22 Apr 2018 07:14  Phos  2.9       22 Apr 2018 07:14  Mg     2.5       22 Apr 2018 07:14      RADIOLOGY:   REVIEWED BY ME: YES {x}     4/21 Head CT non-contrast:   IMPRESSION:  No acute hemorrhage or mass effect.   Prominent ventricles may be related to central atrophy. Correlate with   symptoms to exclude normal pressure hydrocephalus                                      4/20 CT C/A/P w/o Contrast:   CHEST:   LUNGS AND LARGE AIRWAYS: Patent central airways. Consolidative opacity in   the right lower lobe. Scattered tree-in-bud nodules in the right middle   and lower lobes. A 7 mm nodule in the right middle lobe (2:69).  PLEURA: Small right pleural effusion.  VESSELS: Mild atherosclerotic calcifications of the thoracic aorta.  HEART: Heart size is normal. No pericardial effusion. Aortic valve and   coronary artery calcifications.  MEDIASTINUM AND JYOTHI: No lymphadenopathy.  CHEST WALL AND LOWER NECK: Within normal limits.   ABDOMEN AND PELVIS:  LIVER: Within normal limits.  BILE DUCTS: Normal caliber.  GALLBLADDER: Cholecystectomy.  SPLEEN: Within normal limits.  PANCREAS: Within normal limits.  ADRENALS: Within normal limits.  KIDNEYS/URETERS: Mild bilateral hydroureteronephrosis without an   obstructing renal calculus. Nonobstructing right 4 mm calculus and   additional nonobstructing left 6-7 mm calculi. Left renal cyst.  BLADDER: Distended.  REPRODUCTIVE ORGANS: The prostate is enlarged.  BOWEL/ABDOMINAL WALL:  Large left inguinal hernia containing   nonobstructing loop of descending colon and free fluid. No bowel   obstruction. Appendix is not identified.  PERITONEUM: No intraperitoneal free air.  VESSELS:  Atherosclerotic calcification of the abdominal aorta.  RETROPERITONEUM: No lymphadenopathy.    BONES: Degenerative changes of the spine. Old right rib fracture.  IMPRESSION:   Right lower lobe pneumonia. Small right pleural effusion.  Mild bilateral hydroureteronephrosis and distended urinary bladder.

## 2018-04-22 NOTE — DIETITIAN INITIAL EVALUATION ADULT. - NS AS NUTRI INTERV MEALS SNACK
Texture-modified diet/Dysphagia 1 with honey thick liquids.  RDN to provide Honey thick health shakes.

## 2018-04-22 NOTE — PROGRESS NOTE ADULT - PROBLEM SELECTOR PLAN 2
Cr. 7.08, unknown baseline, patient admitted w/ MATTHEW likely postobstructive azotemia 2/2 bilateral hydronephrosis w/ large distended bladder seen on CT imaging  -now resolving s/p cat placement w/ 1.7 liters output  -Cr. improving w/ serial BMPs, will continue to monitor urine output, currently no indication for HD  -closely monitor lytes in setting of postobstructive diuresis   - c/w tele monitoring   - appreciate nephrology recs.

## 2018-04-22 NOTE — CHART NOTE - NSCHARTNOTEFT_GEN_A_CORE
Upon Nutritional Assessment by the Registered Dietitian your patient was determined to meet criteria / has evidence of the following diagnosis/diagnoses:          [ ]  Mild Protein Calorie Malnutrition        [ ]  Moderate Protein Calorie Malnutrition        [x ] Severe Protein Calorie Malnutrition        [ ] Unspecified Protein Calorie Malnutrition        [ ] Underweight / BMI <19        [ ] Morbid Obesity / BMI > 40      Findings as based on:  [x ] Comprehensive nutrition assessment   [ ] Nutrition Focused Physical Exam  [ x] Other: Pronounced cachexia with protruding ribs, clavicles, muscle wasting, weight loss      Nutrition Plan/Recommendations:  Continue Dysphagia 1 with HC liquids, await swallow DANIEL chávez to provide HC health shakes.        PROVIDER Section:     By signing this assessment you are acknowledging and agree with the diagnosis/diagnoses assigned by the Registered Dietitian    Comments:

## 2018-04-22 NOTE — DIETITIAN INITIAL EVALUATION ADULT. - PROBLEM SELECTOR PLAN 9
-PT: recent fall  -wound care consult: stage I ulcer  -nutrition c/s: patient appears malnutritioned, cachetic  -s/w c/s: patient's wife states it is difficult for her to care for him

## 2018-04-22 NOTE — PROGRESS NOTE ADULT - ASSESSMENT
87 y/o cachectic Male w/ no known pmhx presenting s/p fall, found to be hyperkalemic, victoria 2/ 2 obstructive uropathy, acute encephalopathy most likely 2/2 to uremia, anion gap metabolic acidosis 2/2 uremia and sepsis likely 2/2 CAP. Remains HD stable and clinically improved.

## 2018-04-22 NOTE — PROGRESS NOTE ADULT - ATTENDING COMMENTS
No new complaints  1.  Obstructive uropathy.  Check PSA.  Junior drainage  2.  ARF--dramatic improvement.  NO HD  3.  HyperK+--NaHCO3, distal Na delivery, rx of 1 all involved in improvement  4.  HyperNa-- needs free H2O supplementation.  1,2 acquired nephrogenic DI + isotonic fluids

## 2018-04-22 NOTE — DIETITIAN INITIAL EVALUATION ADULT. - PHYSICAL APPEARANCE
emaciated/Cachectic with pronounced protrusion of ribs, clavicles, squared off shoulders, sunken in buccal fold, b/l temporal wasting.

## 2018-04-22 NOTE — PROGRESS NOTE ADULT - PROBLEM SELECTOR PLAN 1
Resolved. K+8.4 on admission, EKG w/ no evidence of peaked T waves  -hyperkalemia most likely 2/2 MATTHEW which is 2/2 obstructive uropathy (seen on CT A/P) now s/p cat placement. patient s/p calcium gluconate x 1g, humilin 10 units x1, sodium bicarb 50mg x 1, dextrose 50mg x 1, kayexelate x 1. S/p bicarb gtt. Per nephrology, as hyperK downtrended, no indication for urgent HD. Remains to have adequate UOP.  -continue w/ telemetry monitoring  - continue to monitor K w/ daily BMPs

## 2018-04-22 NOTE — PROGRESS NOTE ADULT - PROBLEM SELECTOR PLAN 4
AG 24 on admission, most likely in setting of uremia ( on admission) and postobstructive MATTHEW. Now improving s/p cat placement. No role for HD at this time.  -c/t to monitor BMP   - c/w indwelling cat

## 2018-04-22 NOTE — DIETITIAN INITIAL EVALUATION ADULT. - PROBLEM SELECTOR PLAN 3
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 nutritional deficiencies vs intracranial pathology (malignancy), and patient had recent fall, will r/o bleed  -will obtain CTH no contrast  -will need to discuss w/ renal regarding dialysis if patient urine output decreases, as it is indication for urgent HD  -CTH on my read showing some degree atrophy. f/u official CTH report  -B12, folate, TSH, RPR, HIV, urine tox  ***PLEASE CALL FAMILY IN AM TO OBTAIN COLLATERAL INFO FROM WIFE, unable to be reach currently

## 2018-04-22 NOTE — PROGRESS NOTE ADULT - PROBLEM SELECTOR PLAN 3
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 nutritional deficiencies. CT head w/out e/o hemorrhage or mass, although concerning for NPH. B12, folate,  TSH, RPR, and HIV normal .  -continue to montior mental status  - should pt decline, will consider neurology evaluation

## 2018-04-22 NOTE — PROGRESS NOTE ADULT - PROBLEM SELECTOR PLAN 2
Pt with elevated serum sodium after resolving obstruction. Likely due to obstrictive diuresis. Pt would benefit from free water--recommend this can be either IV D5W or 1/2 NS. May promote oral free water intake as well.

## 2018-04-22 NOTE — PROGRESS NOTE ADULT - PROBLEM SELECTOR PLAN 1
MATTHEW in the setting of obstruction. Pt with b/l hydronephrosis and significant urine output with cat placement. Pt with significantly improving Scr after cat placement. Recommend BNP level. Continue to monitor urine output, BMP and intake/output. Avoid NSAIDs, ACEI/ARBs, RCA and nephrotoxins.

## 2018-04-22 NOTE — DIETITIAN INITIAL EVALUATION ADULT. - PROBLEM SELECTOR PLAN 1
-K+8.4 on admission, EKG w/ no evidence of peaked T waves  -hyperkalemia most likely 2/2 MATTHEW which is 2/2 obstructive uropathy (seen on CT A/P) now s/p cat placement  -patient s/p calcium gluconate x 1g, humilin 10 units x1, sodium bicarb 50mg x 1, dextrose 50mg x 1  -repeat K+ 5.4, currently on bicarb gtt @ 100cc's/hr  -continue w/ telemetry  -seen by nephrology and MICU and currently no indication for urgent HD or ICU level of care, hyperkalemia trending down, w/ good urine output, will continue w/ bicarb gtt  -will give kaexylate  -vbg, BMP q8 to monitor electrolytes and bicarb level

## 2018-04-22 NOTE — DIETITIAN INITIAL EVALUATION ADULT. - PROBLEM SELECTOR PLAN 4
-AG 24 on admission, most likely in setting of uremia ( on admission)  - followed by renal  -patient presented w/ victoria most likely 2/2 to obstructive uropathy, w/ cat placement, s/p 1.7 Liters  -currently not candidate for dialysis as per renal as K+ improving on bicarb gtt and patient maintaining urine output, will continue to monitor BMP and patient's clinical symptoms, as patient currently being treated for obstructive uropathy w/ cat placement  -patient does have appear to have uremic encephalopathy w/ associated hiccups, will need to d/w w/ renal regarding dialysis for uremia  -BMP ordered now to monitor electrolytes  -vbg, BMP q8 to monitor electrolytes and bicarb level

## 2018-04-22 NOTE — PROGRESS NOTE ADULT - SUBJECTIVE AND OBJECTIVE BOX
[facility] DIVISION OF KIDNEY DISEASES AND HYPERTENSION -- FOLLOW UP NOTE  --------------------------------------------------------------------------------  Chief Complaint: MATTHEW from obstruction    24 hour events/subjective:  Pt non-oliguric with good urine output. SCr trending down. Pt without complaints. Tolerating oral intake.       PAST HISTORY  --------------------------------------------------------------------------------  No significant changes to PMH, PSH, FHx, SHx, unless otherwise noted    ALLERGIES & MEDICATIONS  --------------------------------------------------------------------------------  Allergies    No Known Allergies    Intolerances      Standing Inpatient Medications  dextrose 5% + sodium chloride 0.45%. 1000 milliLiter(s) IV Continuous <Continuous>  heparin  Injectable 5000 Unit(s) SubCutaneous every 8 hours  piperacillin/tazobactam IVPB. 3.375 Gram(s) IV Intermittent every 12 hours  tamsulosin 0.4 milliGRAM(s) Oral at bedtime    PRN Inpatient Medications      REVIEW OF SYSTEMS  --------------------------------------------------------------------------------  Gen: No fatigue, fevers/chills, weakness  Head/Eyes/Ears/Mouth: No headache  Respiratory: No dyspnea, cough  CV: No chest pain, PND  GI: No abdominal pain, diarrhea, constipation, nausea, vomiting  : No increased frequency  MSK: +edema  Neuro: No dizziness/lightheadedness  Heme: No bleeding      All other systems were reviewed and are negative, except as noted.    VITALS/PHYSICAL EXAM  --------------------------------------------------------------------------------  T(C): 36.8 (04-22-18 @ 05:28), Max: 37 (04-21-18 @ 17:44)  HR: 91 (04-22-18 @ 05:28) (50 - 91)  BP: 142/73 (04-22-18 @ 05:28) (121/60 - 145/82)  RR: 18 (04-22-18 @ 05:28) (16 - 18)  SpO2: 99% (04-22-18 @ 05:28) (96% - 99%)  Wt(kg): --  Height (cm): 180.34 (04-21-18 @ 17:44)  Weight (kg): 62.1 (04-21-18 @ 17:44)  BMI (kg/m2): 19.1 (04-21-18 @ 17:44)  BSA (m2): 1.8 (04-21-18 @ 17:44)      04-21-18 @ 07:01  -  04-22-18 @ 07:00  --------------------------------------------------------  IN: 700 mL / OUT: 1750 mL / NET: -1050 mL    04-22-18 @ 07:01  -  04-22-18 @ 14:59  --------------------------------------------------------  IN: 240 mL / OUT: 0 mL / NET: 240 mL    Physical Exam:  	Gen: Elderly, Cachetic   	HEENT: Poor dentition   	Pulm: + crackles B/L  	CV: RRR, S1S2; no rub  	Abd: +BS, soft, nontender/nondistended  	: No suprapubic tenderness + cat catheter   	UE: Warm, no asterixis  	LE: Warm, + b/l LE edema  	Neuro: AAOX3, Answers questions appropriately  	Skin: Warm, without rashes    LABS/STUDIES  --------------------------------------------------------------------------------              11.9   11.9  >-----------<  219      [04-22-18 @ 07:18]              35.9     152  |  107  |  64  ----------------------------<  120      [04-22-18 @ 07:14]  4.1   |  29  |  1.76        Ca     9.1     [04-22-18 @ 07:14]      Mg     2.5     [04-22-18 @ 07:14]      Phos  2.9     [04-22-18 @ 07:14]    TPro  7.2  /  Alb  3.5  /  TBili  0.5  /  DBili  x   /  AST  8   /  ALT  12  /  AlkPhos  93  [04-21-18 @ 04:01]    PT/INR: PT 12.3 , INR 1.13       [04-20-18 @ 20:07]  PTT: 26.8       [04-21-18 @ 04:01]          [04-20-18 @ 21:16]    Creatinine Trend:  SCr 1.76 [04-22 @ 07:14]  SCr 3.17 [04-21 @ 13:33]  SCr 4.30 [04-21 @ 05:24]  SCr 4.36 [04-21 @ 04:01]  SCr 6.16 [04-20 @ 21:16]    Urinalysis - [04-20-18 @ 18:29]      Color Yellow / Appearance Clear / SG 1.016 / pH 6.0      Gluc Negative / Ketone Negative  / Bili Negative / Urobili Negative       Blood Moderate / Protein Trace / Leuk Est Negative / Nitrite Negative      RBC 10-25 / WBC 3-5 / Hyaline  / Gran  / Sq Epi  / Non Sq Epi  / Bacteria     Urine Creatinine 86      [04-21-18 @ 07:09]  Urine Protein 57      [04-21-18 @ 07:09]  Urine Sodium <20      [04-21-18 @ 04:29]  Urine Urea Nitrogen 1042      [04-21-18 @ 07:09]    TSH 0.27      [04-21-18 @ 07:04]      Syphilis Screen (Treponema Pallidum Ab) Negative      [04-21-18 @ 07:04]

## 2018-04-22 NOTE — DIETITIAN INITIAL EVALUATION ADULT. - ENERGY NEEDS
HT 71 inches,  pounds,  pounds.   pounds.    Dxd with Hyperkalemia, MATTHEW, obstructive uropathy, uremia  Skin- Stage 1 to sacrum, edema noted to lower extremities. NFPE deferred as patient too confused to consent.  Obvious muscle and fat loss.  Swallow eval pending. HT 71 inches,  pounds,  pounds.   pounds.  BMI 19.1  Dxd with Hyperkalemia, MATTHEW, obstructive uropathy, uremia  Skin- Stage 1 to sacrum, edema noted to lower extremities. NFPE deferred as patient too confused to consent.  Obvious muscle and fat loss.  Swallow eval pending.

## 2018-04-22 NOTE — DIETITIAN INITIAL EVALUATION ADULT. - OTHER INFO
Patient referred for nutrition assessment.  Patient found partially sitting up in bed, sucking down juice.  Complains of being very thirsty.  Lunch tray untouched.  When asked how he is doing with his eating, states "great.  Its like Boy  food".  Patient has been eating poorly and unkempt, with pronounced malnutrition with skin and bones appearance, sunken in chest wall.  As Per RN, patient has 24/7 help at home and  reports that "this just happened 3 days ago when he fell".  Patient reports to have no teeth but  reports that he eats whatever he wants.   As per MD, may be dementia

## 2018-04-22 NOTE — PROGRESS NOTE ADULT - PROBLEM SELECTOR PLAN 6
Admitted with sepsis (leukocytosis >20, tachycardic) likely 2/2 CAP with CXR revealign RLL opacity and patient has poor dentition  -c/w  zosyn for broader coverage as it can be CAP vs aspiration pna  -BCx, UCx NGTD; legionella negative   -trending fever curve, cbc qd

## 2018-04-22 NOTE — DIETITIAN INITIAL EVALUATION ADULT. - PROBLEM SELECTOR PLAN 7
-as per ED documentation and MICU documentation, patient present for mechanical fall  -no overt bruises/echhymoses, hematomas noted on physical exam, patient able to move all four extremities, w/ good strength in upper and lower extremities, w/ no c/o of pain  -will obtain CTH to r/o hemorrhage/trauma, CTA/P and CT chest show no acute trauma, no acute rib fractures  - PT eval

## 2018-04-23 LAB
ANION GAP SERPL CALC-SCNC: 12 MMOL/L — SIGNIFICANT CHANGE UP (ref 5–17)
ANION GAP SERPL CALC-SCNC: 14 MMOL/L — SIGNIFICANT CHANGE UP (ref 5–17)
APPEARANCE UR: ABNORMAL
BILIRUB UR-MCNC: NEGATIVE — SIGNIFICANT CHANGE UP
BUN SERPL-MCNC: 33 MG/DL — HIGH (ref 7–23)
BUN SERPL-MCNC: 42 MG/DL — HIGH (ref 7–23)
CALCIUM SERPL-MCNC: 8.6 MG/DL — SIGNIFICANT CHANGE UP (ref 8.4–10.5)
CALCIUM SERPL-MCNC: 8.8 MG/DL — SIGNIFICANT CHANGE UP (ref 8.4–10.5)
CHLORIDE SERPL-SCNC: 112 MMOL/L — HIGH (ref 96–108)
CHLORIDE SERPL-SCNC: 113 MMOL/L — HIGH (ref 96–108)
CO2 SERPL-SCNC: 28 MMOL/L — SIGNIFICANT CHANGE UP (ref 22–31)
CO2 SERPL-SCNC: 31 MMOL/L — SIGNIFICANT CHANGE UP (ref 22–31)
COLOR SPEC: YELLOW — SIGNIFICANT CHANGE UP
CREAT ?TM UR-MCNC: 70 MG/DL — SIGNIFICANT CHANGE UP
CREAT SERPL-MCNC: 1.1 MG/DL — SIGNIFICANT CHANGE UP (ref 0.5–1.3)
CREAT SERPL-MCNC: 1.21 MG/DL — SIGNIFICANT CHANGE UP (ref 0.5–1.3)
DIFF PNL FLD: ABNORMAL
GLUCOSE SERPL-MCNC: 126 MG/DL — HIGH (ref 70–99)
GLUCOSE SERPL-MCNC: 143 MG/DL — HIGH (ref 70–99)
GLUCOSE UR QL: NEGATIVE — SIGNIFICANT CHANGE UP
HCT VFR BLD CALC: 34.9 % — LOW (ref 39–50)
HGB BLD-MCNC: 11.4 G/DL — LOW (ref 13–17)
KETONES UR-MCNC: NEGATIVE — SIGNIFICANT CHANGE UP
LEUKOCYTE ESTERASE UR-ACNC: ABNORMAL
MAGNESIUM SERPL-MCNC: 2.5 MG/DL — SIGNIFICANT CHANGE UP (ref 1.6–2.6)
MCHC RBC-ENTMCNC: 30.8 PG — SIGNIFICANT CHANGE UP (ref 27–34)
MCHC RBC-ENTMCNC: 32.5 GM/DL — SIGNIFICANT CHANGE UP (ref 32–36)
MCV RBC AUTO: 94.7 FL — SIGNIFICANT CHANGE UP (ref 80–100)
NITRITE UR-MCNC: NEGATIVE — SIGNIFICANT CHANGE UP
OSMOLALITY UR: 560 MOS/KG — SIGNIFICANT CHANGE UP (ref 300–900)
PH UR: 6 — SIGNIFICANT CHANGE UP (ref 5–8)
PHOSPHATE SERPL-MCNC: 2.1 MG/DL — LOW (ref 2.5–4.5)
PLATELET # BLD AUTO: 245 K/UL — SIGNIFICANT CHANGE UP (ref 150–400)
POTASSIUM SERPL-MCNC: 3.6 MMOL/L — SIGNIFICANT CHANGE UP (ref 3.5–5.3)
POTASSIUM SERPL-MCNC: 4.2 MMOL/L — SIGNIFICANT CHANGE UP (ref 3.5–5.3)
POTASSIUM SERPL-SCNC: 3.6 MMOL/L — SIGNIFICANT CHANGE UP (ref 3.5–5.3)
POTASSIUM SERPL-SCNC: 4.2 MMOL/L — SIGNIFICANT CHANGE UP (ref 3.5–5.3)
PROT UR-MCNC: 30 MG/DL
PSA FREE FLD-MCNC: 1.58 NG/ML — SIGNIFICANT CHANGE UP
PSA FREE FLD-MCNC: 18.5 — SIGNIFICANT CHANGE UP
PSA FREE MFR FLD: 8.56 NG/ML — HIGH (ref 0–4)
RBC # BLD: 3.69 M/UL — LOW (ref 4.2–5.8)
RBC # FLD: 12.2 % — SIGNIFICANT CHANGE UP (ref 10.3–14.5)
SODIUM SERPL-SCNC: 154 MMOL/L — HIGH (ref 135–145)
SODIUM SERPL-SCNC: 156 MMOL/L — HIGH (ref 135–145)
SODIUM UR-SCNC: 26 MMOL/L — SIGNIFICANT CHANGE UP
SP GR SPEC: 1.02 — SIGNIFICANT CHANGE UP (ref 1.01–1.02)
UROBILINOGEN FLD QL: NEGATIVE — SIGNIFICANT CHANGE UP
UUN UR-MCNC: 1068 MG/DL — SIGNIFICANT CHANGE UP
WBC # BLD: 11.9 K/UL — HIGH (ref 3.8–10.5)
WBC # FLD AUTO: 11.9 K/UL — HIGH (ref 3.8–10.5)

## 2018-04-23 PROCEDURE — 99233 SBSQ HOSP IP/OBS HIGH 50: CPT | Mod: GC

## 2018-04-23 PROCEDURE — 93970 EXTREMITY STUDY: CPT | Mod: 26

## 2018-04-23 PROCEDURE — 74176 CT ABD & PELVIS W/O CONTRAST: CPT | Mod: 26

## 2018-04-23 PROCEDURE — 71250 CT THORAX DX C-: CPT | Mod: 26

## 2018-04-23 RX ORDER — ENOXAPARIN SODIUM 100 MG/ML
60 INJECTION SUBCUTANEOUS
Qty: 0 | Refills: 0 | Status: DISCONTINUED | OUTPATIENT
Start: 2018-04-23 | End: 2018-04-29

## 2018-04-23 RX ORDER — RIVAROXABAN 15 MG-20MG
1 KIT ORAL
Qty: 1 | Refills: 0 | OUTPATIENT
Start: 2018-04-23 | End: 2018-05-13

## 2018-04-23 RX ORDER — SODIUM CHLORIDE 9 MG/ML
1000 INJECTION, SOLUTION INTRAVENOUS
Qty: 0 | Refills: 0 | Status: DISCONTINUED | OUTPATIENT
Start: 2018-04-23 | End: 2018-04-24

## 2018-04-23 RX ORDER — APIXABAN 2.5 MG/1
1 TABLET, FILM COATED ORAL
Qty: 60 | Refills: 0 | OUTPATIENT
Start: 2018-04-23 | End: 2018-05-22

## 2018-04-23 RX ADMIN — SODIUM CHLORIDE 50 MILLILITER(S): 9 INJECTION, SOLUTION INTRAVENOUS at 08:01

## 2018-04-23 RX ADMIN — TAMSULOSIN HYDROCHLORIDE 0.4 MILLIGRAM(S): 0.4 CAPSULE ORAL at 21:09

## 2018-04-23 RX ADMIN — PIPERACILLIN AND TAZOBACTAM 25 GRAM(S): 4; .5 INJECTION, POWDER, LYOPHILIZED, FOR SOLUTION INTRAVENOUS at 17:00

## 2018-04-23 RX ADMIN — HEPARIN SODIUM 5000 UNIT(S): 5000 INJECTION INTRAVENOUS; SUBCUTANEOUS at 13:22

## 2018-04-23 RX ADMIN — Medication 62.5 MILLIMOLE(S): at 11:54

## 2018-04-23 RX ADMIN — PIPERACILLIN AND TAZOBACTAM 25 GRAM(S): 4; .5 INJECTION, POWDER, LYOPHILIZED, FOR SOLUTION INTRAVENOUS at 05:27

## 2018-04-23 RX ADMIN — HEPARIN SODIUM 5000 UNIT(S): 5000 INJECTION INTRAVENOUS; SUBCUTANEOUS at 05:27

## 2018-04-23 RX ADMIN — ENOXAPARIN SODIUM 60 MILLIGRAM(S): 100 INJECTION SUBCUTANEOUS at 16:11

## 2018-04-23 NOTE — PROGRESS NOTE ADULT - PROBLEM SELECTOR PLAN 2
- Cr. 7.08 is now 1.21, unknown baseline, patient admitted w/ MATTHEW likely postobstructive azotemia 2/2 bilateral hydronephrosis w/ large distended bladder seen on CT imaging  -now resolving s/p cat placement w/ 1.7 liters output  -Cr. improving w/ serial BMPs, will continue to monitor urine output, currently no indication for HD  -closely monitor lytes in setting of postobstructive diuresis   - started flomax. consider TOV this admission. checking PSA.   - c/w tele monitoring   - appreciate nephrology recs.

## 2018-04-23 NOTE — PROGRESS NOTE ADULT - ASSESSMENT
86 year old cachectic Male w/ no known pmhx presenting s/p fall, found to be hyperkalemic, MATTHEW 2/ 2 obstructive uropathy +/- prerenal component, acute encephalopathy most likely 2/2 to uremia vs progressive dementia, anion gap metabolic acidosis 2/2 uremia and sepsis likely 2/2 CAP. Remains HD stable and clinically improved.

## 2018-04-23 NOTE — SWALLOW BEDSIDE ASSESSMENT ADULT - SWALLOW EVAL: RECOMMENDED FEEDING/EATING TECHNIQUES
allow for swallow between intakes/small sips/bites/crush medication (when feasible)/position upright (90 degrees)/maintain upright posture during/after eating for 30 mins/oral hygiene

## 2018-04-23 NOTE — PHYSICAL THERAPY INITIAL EVALUATION ADULT - ACTIVE RANGE OF MOTION EXAMINATION, REHAB EVAL
bilateral upper extremity Active ROM was WFL (within functional limits)/Estevan hip flexion 0-30, knee flexion 0-10, ankle DF neutral.

## 2018-04-23 NOTE — SWALLOW BEDSIDE ASSESSMENT ADULT - COMMENTS
4/20/18 CXR: IMPRESSION:  Hazy opacity in the right lower lung may be secondary to pneumonia.  CT Abd/Pel and Chest: IMPRESSION: Right lower lobe pneumonia. Small right pleural effusion.  Mild bilateral hydroureteronephrosis and distended urinary bladder.    4/21/18 CT Brain: IMPRESSION: No acute hemorrhage or mass effect. Prominent ventricles may be related to central atrophy. Correlate with   symptoms to exclude normal pressure hydrocephalus.  4/23/18 Per MD f/u: Pneumonia of right lower lobe due to infectious organism.  Plan: -septic on admission; improved on zosyn day 4 of 5 of antibiotics; - zosyn for possible aspiration pna given AMS PTA.  Hyperkalemia; Resolved. K+8.4 on admission, EKG w/ no evidence of peaked T waves; hyperkalemia most likely 2/2 MATTHEW which is 2/2 obstructive uropathy (seen on CT A/P) now s/p cat placement. 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 nutritional deficiencies. CT head w/out e/o hemorrhage or mass, although concerning for NPH.  Metabolic acidosis, increased anion gap; now improving s/p cat placement. No role for HD at this time.   Wound care consult: stage I ulcer; nutrition c/s: patient appears cachetic; s/w c/s: patient's HCP states it is difficult for her to care for him.  Dysphagia diet.

## 2018-04-23 NOTE — PHYSICAL THERAPY INITIAL EVALUATION ADULT - STRENGTHENING, PT EVAL
GOAL: Patient will demonstrate 3/5 strength t/o to improve performance and safety of standing activities, transfers and ambulation in 4 weeks

## 2018-04-23 NOTE — PROGRESS NOTE ADULT - PROBLEM SELECTOR PLAN 4
Resolved. K+8.4 on admission, EKG w/ no evidence of peaked T waves  -hyperkalemia most likely 2/2 MATTHEW which is 2/2 obstructive uropathy (seen on CT A/P) now s/p cat placement.   - S/p bicarb gtt. Per nephrology, as hyperK downtrended, no indication for urgent HD. Remains to have adequate UOP.  - continue w/ telemetry monitoring  - continue to monitor K w/ daily BMPs

## 2018-04-23 NOTE — PROGRESS NOTE ADULT - PROBLEM SELECTOR PLAN 9
-PT: recent fall  -wound care consult: stage I ulcer  -nutrition c/s: patient appears cachetic  -s/w c/s: patient's HCP states it is difficult for her to care for him

## 2018-04-23 NOTE — PROGRESS NOTE ADULT - SUBJECTIVE AND OBJECTIVE BOX
Elizabeth Aguirre MD   PGY 2  Pager [phone]  Page [phone] or [phone] after 7 pm       Patient is a 86y old  Male who presents with a chief complaint of fall, decreased po intake (21 Apr 2018 02:16)      INTERVAL HPI/OVERNIGHT EVENTS:        REVIEW OF SYSTEMS:  CONSTITUTIONAL: No fever, chills  ENMT:  No difficulty hearing, no change in vision  NECK: No pain or stiffness  RESPIRATORY: No cough, SOB  CARDIOVASCULAR: No chest pain, palpitations  GASTROINTESTINAL: No abdominal pain. No nausea, vomiting, or diarrhea  GENITOURINARY: No dysuria  NEUROLOGICAL: No HA  SKIN: No itching, burning, rashes, or lesions   LYMPH NODES: No enlarged glands  ENDOCRINE: No heat or cold intolerance; No hair loss  MUSCULOSKELETAL: No joint pain or swelling; No muscle, back, or extremity pain  PSYCHIATRIC: No depression, anxiety  HEME/LYMPH: No easy bruising, or bleeding gums    T(C): 36.5 (04-23-18 @ 04:20), Max: 36.7 (04-22-18 @ 17:08)  HR: 88 (04-23-18 @ 04:20) (55 - 88)  BP: 164/81 (04-23-18 @ 04:20) (158/84 - 164/81)  RR: 18 (04-23-18 @ 04:20) (18 - 20)  SpO2: 98% (04-23-18 @ 04:20) (98% - 100%)  Wt(kg): --Vital Signs Last 24 Hrs  T(C): 36.5 (23 Apr 2018 04:20), Max: 36.7 (22 Apr 2018 17:08)  T(F): 97.7 (23 Apr 2018 04:20), Max: 98.1 (22 Apr 2018 17:08)  HR: 88 (23 Apr 2018 04:20) (55 - 88)  BP: 164/81 (23 Apr 2018 04:20) (158/84 - 164/81)  BP(mean): --  RR: 18 (23 Apr 2018 04:20) (18 - 20)  SpO2: 98% (23 Apr 2018 04:20) (98% - 100%)    PHYSICAL EXAM:  GENERAL: elderly cachectic gentleman, frail, NAD and resting comfortably   HEAD:  Atraumatic, Normocephalic  ENT: EOMI, PERRLA, conjunctiva and sclera clear, Neck supple, No JVD, dry MM   CHEST/LUNG: Pectus excavatum, Clear to auscultation bilaterally; No wheeze, equal breath sounds bilaterally   HEART: RRR, +S1/S2, no m/r/g   ABDOMEN: Soft, Nontender, Nondistended  EXTREMITIES:  +b/l LE edema  NEUROLOGY: AAOx2(oriented to person and place but not time), non-focal, cranial nerves intact  SKIN: Normal color, No rashes or lesions    Consultant(s) Notes Reviewed:  [x ] YES  [ ] NO  Care Discussed with Consultants/Other Providers [ x] YES  [ ] NO    LABS:                        11.4   11.9  )-----------( 245      ( 23 Apr 2018 06:33 )             34.9     04-23    156<H>  |  113<H>  |  42<H>  ----------------------------<  126<H>  4.2   |  31  |  1.21    Ca    8.8      23 Apr 2018 06:33  Phos  2.1     04-23  Mg     2.5     04-23          CAPILLARY BLOOD GLUCOSE                RADIOLOGY & ADDITIONAL TESTS:    Imaging Personally Reviewed:  [ ] YES  [ ] NO Elizabeth Aguirre MD   PGY 2  Pager [phone]  Page [phone] or [phone] after 7 pm       Patient is a 86y old  Male who presents with a chief complaint of fall, decreased po intake (21 Apr 2018 02:16)      INTERVAL HPI/OVERNIGHT EVENTS: no overnight events, patient without complaints this morning.     REVIEW OF SYSTEMS:  CONSTITUTIONAL: No fever, chills  ENMT:  No difficulty hearing, no change in vision  NECK: No pain or stiffness  RESPIRATORY: No cough, SOB  CARDIOVASCULAR: No chest pain, palpitations  GASTROINTESTINAL: No abdominal pain. No nausea, vomiting, or diarrhea  GENITOURINARY: No dysuria  NEUROLOGICAL: No HA  SKIN: No itching, burning, rashes, or lesions   LYMPH NODES: No enlarged glands  ENDOCRINE: No heat or cold intolerance; No hair loss  MUSCULOSKELETAL: No joint pain or swelling; No muscle, back, or extremity pain  PSYCHIATRIC: No depression, anxiety  HEME/LYMPH: No easy bruising, or bleeding gums    T(C): 36.5 (04-23-18 @ 04:20), Max: 36.7 (04-22-18 @ 17:08)  HR: 88 (04-23-18 @ 04:20) (55 - 88)  BP: 164/81 (04-23-18 @ 04:20) (158/84 - 164/81)  RR: 18 (04-23-18 @ 04:20) (18 - 20)  SpO2: 98% (04-23-18 @ 04:20) (98% - 100%)  Wt(kg): --Vital Signs Last 24 Hrs  T(C): 36.5 (23 Apr 2018 04:20), Max: 36.7 (22 Apr 2018 17:08)  T(F): 97.7 (23 Apr 2018 04:20), Max: 98.1 (22 Apr 2018 17:08)  HR: 88 (23 Apr 2018 04:20) (55 - 88)  BP: 164/81 (23 Apr 2018 04:20) (158/84 - 164/81)  BP(mean): --  RR: 18 (23 Apr 2018 04:20) (18 - 20)  SpO2: 98% (23 Apr 2018 04:20) (98% - 100%)    PHYSICAL EXAM:  GENERAL: elderly cachectic gentleman, frail, NAD and resting comfortably   HEAD:  Atraumatic, Normocephalic  ENT: EOMI, PERRLA, conjunctiva and sclera clear, Neck supple, No JVD, dry MM   CHEST/LUNG: Pectus excavatum, Clear to auscultation bilaterally; No wheeze, equal breath sounds bilaterally   HEART: RRR, +S1/S2, no m/r/g   ABDOMEN: Soft, Nontender, Nondistended  EXTREMITIES:  +b/l LE edema  NEUROLOGY: AAOx2(oriented to person and place but not time), non-focal, cranial nerves intact  SKIN: Normal color, No rashes or lesions    Consultant(s) Notes Reviewed:  [x ] YES  [ ] NO  Care Discussed with Consultants/Other Providers [ x] YES  [ ] NO    LABS:                        11.4   11.9  )-----------( 245      ( 23 Apr 2018 06:33 )             34.9     04-23    156<H>  |  113<H>  |  42<H>  ----------------------------<  126<H>  4.2   |  31  |  1.21    Ca    8.8      23 Apr 2018 06:33  Phos  2.1     04-23  Mg     2.5     04-23          CAPILLARY BLOOD GLUCOSE                RADIOLOGY & ADDITIONAL TESTS:    Imaging Personally Reviewed:  [ ] YES  [ ] NO

## 2018-04-23 NOTE — SWALLOW BEDSIDE ASSESSMENT ADULT - ADDITIONAL RECOMMENDATIONS
Suggest reevaluation of swallowing as Pt's status improves with possible MBS/VFSS pending findings.  Should Pt evidence any s/s of aspiration with the aforementioned diet, suggest MD d/c oral diet and consider GOC d/w Pt/family/HCP to determine wishes re: provision of nutrition and hydration.

## 2018-04-23 NOTE — SWALLOW BEDSIDE ASSESSMENT ADULT - PHARYNGEAL PHASE
suspect mildly delayed pharyngeal swallow; adequate laryngeal elevation; no s/s of aspiration evident. Delayed throat clear post oral intake

## 2018-04-23 NOTE — SWALLOW BEDSIDE ASSESSMENT ADULT - SWALLOW EVAL: DIAGNOSIS
Pt presents with oropharyngeal dysphagia.  Pt exhibits s/s of aspiration post swallow on thin and nectar thickened liquids.  Laryngeal elevation deemed to be adequate.  No s/s of aspiration evident with purees or honey thickened liquids.  Pt was not wearing dentures during this exam. Pt presents with oropharyngeal dysphagia.  Suspect reduced control of bolus with larger bolus size with thin liquid and nectar thickened liquid trials.  Pt exhibits s/s of aspiration post swallow on thin and nectar thickened liquids.  Laryngeal elevation deemed to be adequate.  No s/s of aspiration evident with purees or honey thickened liquids.  Pt was not wearing dentures during this exam.

## 2018-04-23 NOTE — PROGRESS NOTE ADULT - PROBLEM SELECTOR PLAN 3
-septic on admission; improved on zosyn day 4 of 5 of antibiotics  - zosyn for possible aspiration pna given AMS PTA

## 2018-04-23 NOTE — PHYSICAL THERAPY INITIAL EVALUATION ADULT - ADDITIONAL COMMENTS
(Continuation from Hx) CT Abdomen and Pelvis (4/20) Right lower lobe pneumonia. Small right pleural effusion. Mild bilateral hydroureteronephrosis and distended urinary bladder. (Continuation from Hx) CT Abdomen and Pelvis (4/20) Right lower lobe pneumonia. Small right pleural effusion. Mild bilateral hydroureteronephrosis and distended urinary bladder.    Pt lives in a apartment building, + elevators ground up. Patient and caretaker Celestina albright patient was independent in all ADL's and was ambulatory without any AD's.

## 2018-04-23 NOTE — PROGRESS NOTE ADULT - ATTENDING COMMENTS
I was physically present for the key portions of the evaluation and management (E/M) service provided.  I agree with the above history, physical, and plan which I have reviewed and edited where appropriate. 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 Had lengthy discussion with HCP, who cannot pay for any of his medications and pt will need to rely on medicaid. Will start heparin gtt for now and plan for coumadin treatment. Pt with poor health maintenance, hasn't seen a doctor in 40 years, is not up to date on cancer screenings, so will need close outpatient follow up. D/w HCP who understands.

## 2018-04-23 NOTE — PROGRESS NOTE ADULT - PROBLEM SELECTOR PLAN 1
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

## 2018-04-24 DIAGNOSIS — I82.4Z3 ACUTE EMBOLISM AND THROMBOSIS OF UNSPECIFIED DEEP VEINS OF DISTAL LOWER EXTREMITY, BILATERAL: ICD-10-CM

## 2018-04-24 LAB
ANION GAP SERPL CALC-SCNC: 10 MMOL/L — SIGNIFICANT CHANGE UP (ref 5–17)
APTT BLD: 28.3 SEC — SIGNIFICANT CHANGE UP (ref 27.5–37.4)
BASOPHILS # BLD AUTO: 0 K/UL — SIGNIFICANT CHANGE UP (ref 0–0.2)
BASOPHILS NFR BLD AUTO: 0 % — SIGNIFICANT CHANGE UP (ref 0–2)
BUN SERPL-MCNC: 26 MG/DL — HIGH (ref 7–23)
CALCIUM SERPL-MCNC: 8.9 MG/DL — SIGNIFICANT CHANGE UP (ref 8.4–10.5)
CHLORIDE SERPL-SCNC: 113 MMOL/L — HIGH (ref 96–108)
CO2 SERPL-SCNC: 31 MMOL/L — SIGNIFICANT CHANGE UP (ref 22–31)
CREAT SERPL-MCNC: 1.08 MG/DL — SIGNIFICANT CHANGE UP (ref 0.5–1.3)
EOSINOPHIL # BLD AUTO: 0 K/UL — SIGNIFICANT CHANGE UP (ref 0–0.5)
EOSINOPHIL NFR BLD AUTO: 0.4 % — SIGNIFICANT CHANGE UP (ref 0–6)
GLUCOSE SERPL-MCNC: 106 MG/DL — HIGH (ref 70–99)
HCT VFR BLD CALC: 34.9 % — LOW (ref 39–50)
HGB BLD-MCNC: 11.7 G/DL — LOW (ref 13–17)
INR BLD: 1.08 RATIO — SIGNIFICANT CHANGE UP (ref 0.88–1.16)
LYMPHOCYTES # BLD AUTO: 0.8 K/UL — LOW (ref 1–3.3)
LYMPHOCYTES # BLD AUTO: 7.8 % — LOW (ref 13–44)
MAGNESIUM SERPL-MCNC: 2.3 MG/DL — SIGNIFICANT CHANGE UP (ref 1.6–2.6)
MCHC RBC-ENTMCNC: 31.7 PG — SIGNIFICANT CHANGE UP (ref 27–34)
MCHC RBC-ENTMCNC: 33.4 GM/DL — SIGNIFICANT CHANGE UP (ref 32–36)
MCV RBC AUTO: 94.7 FL — SIGNIFICANT CHANGE UP (ref 80–100)
MONOCYTES # BLD AUTO: 0.5 K/UL — SIGNIFICANT CHANGE UP (ref 0–0.9)
MONOCYTES NFR BLD AUTO: 4.9 % — SIGNIFICANT CHANGE UP (ref 2–14)
NEUTROPHILS # BLD AUTO: 9.2 K/UL — HIGH (ref 1.8–7.4)
NEUTROPHILS NFR BLD AUTO: 86.9 % — HIGH (ref 43–77)
PHOSPHATE SERPL-MCNC: 2.4 MG/DL — LOW (ref 2.5–4.5)
PLATELET # BLD AUTO: 281 K/UL — SIGNIFICANT CHANGE UP (ref 150–400)
POTASSIUM SERPL-MCNC: 4.2 MMOL/L — SIGNIFICANT CHANGE UP (ref 3.5–5.3)
POTASSIUM SERPL-SCNC: 4.2 MMOL/L — SIGNIFICANT CHANGE UP (ref 3.5–5.3)
PROTHROM AB SERPL-ACNC: 11.7 SEC — SIGNIFICANT CHANGE UP (ref 9.8–12.7)
RBC # BLD: 3.69 M/UL — LOW (ref 4.2–5.8)
RBC # FLD: 12.1 % — SIGNIFICANT CHANGE UP (ref 10.3–14.5)
SODIUM SERPL-SCNC: 154 MMOL/L — HIGH (ref 135–145)
WBC # BLD: 10.6 K/UL — HIGH (ref 3.8–10.5)
WBC # FLD AUTO: 10.6 K/UL — HIGH (ref 3.8–10.5)

## 2018-04-24 PROCEDURE — 99233 SBSQ HOSP IP/OBS HIGH 50: CPT | Mod: GC

## 2018-04-24 RX ORDER — SODIUM,POTASSIUM PHOSPHATES 278-250MG
1 POWDER IN PACKET (EA) ORAL
Qty: 0 | Refills: 0 | Status: DISCONTINUED | OUTPATIENT
Start: 2018-04-24 | End: 2018-04-24

## 2018-04-24 RX ORDER — SODIUM CHLORIDE 9 MG/ML
1000 INJECTION, SOLUTION INTRAVENOUS
Qty: 0 | Refills: 0 | Status: DISCONTINUED | OUTPATIENT
Start: 2018-04-24 | End: 2018-04-25

## 2018-04-24 RX ADMIN — SODIUM CHLORIDE 60 MILLILITER(S): 9 INJECTION, SOLUTION INTRAVENOUS at 12:22

## 2018-04-24 RX ADMIN — PIPERACILLIN AND TAZOBACTAM 25 GRAM(S): 4; .5 INJECTION, POWDER, LYOPHILIZED, FOR SOLUTION INTRAVENOUS at 05:23

## 2018-04-24 RX ADMIN — Medication 1 TABLET(S): at 17:25

## 2018-04-24 RX ADMIN — TAMSULOSIN HYDROCHLORIDE 0.4 MILLIGRAM(S): 0.4 CAPSULE ORAL at 21:20

## 2018-04-24 RX ADMIN — ENOXAPARIN SODIUM 60 MILLIGRAM(S): 100 INJECTION SUBCUTANEOUS at 05:23

## 2018-04-24 RX ADMIN — ENOXAPARIN SODIUM 60 MILLIGRAM(S): 100 INJECTION SUBCUTANEOUS at 17:25

## 2018-04-24 NOTE — PROGRESS NOTE ADULT - PROBLEM SELECTOR PLAN 3
- found on [facility] b/l, appears unprovoked  - c/w therapeutic lovenox, will need AC atleast for 3-6 months.  - will contact HCP if patenet can pay out-of-pocket for xarelto

## 2018-04-24 NOTE — PROGRESS NOTE ADULT - SUBJECTIVE AND OBJECTIVE BOX
Patient is a 86y old  Male who presents with a chief complaint of fall, decreased po intake (2018 02:16)      SUBJECTIVE / OVERNIGHT EVENTS: Overnight failed TOV. On cat again. mental status unchanged, remains afebrile.    MEDICATIONS  (STANDING):  dextrose 5%. 1000 milliLiter(s) (60 mL/Hr) IV Continuous <Continuous>  enoxaparin Injectable 60 milliGRAM(s) SubCutaneous two times a day  piperacillin/tazobactam IVPB. 3.375 Gram(s) IV Intermittent every 12 hours  tamsulosin 0.4 milliGRAM(s) Oral at bedtime    MEDICATIONS  (PRN):        CAPILLARY BLOOD GLUCOSE        I&O's Summary    2018 07:01  -  2018 07:00  --------------------------------------------------------  IN: 880 mL / OUT: 1450 mL / NET: -570 mL    2018 07:01  -  2018 12:54  --------------------------------------------------------  IN: 0 mL / OUT: 375 mL / NET: -375 mL    Vital Signs Last 24 Hrs  T(C): 36.6 (2018 04:11), Max: 36.6 (2018 04:11)  T(F): 97.8 (2018 04:11), Max: 97.8 (2018 04:11)  HR: 80 (2018 04:11) (80 - 95)  BP: 158/70 (2018 04:11) (138/73 - 174/81)  BP(mean): --  RR: 18 (2018 04:11) (18 - 18)  SpO2: 96% (2018 04:11) (96% - 100%)    PHYSICAL EXAM:  GENERAL: elderly cachectic gentleman, frail, NAD and resting comfortably   HEAD:  Atraumatic, Normocephalic  ENT: EOMI, PERRLA, conjunctiva and sclera clear, Neck supple, No JVD, dry MM   CHEST/LUNG: Pectus excavatum, Clear to auscultation bilaterally; No wheeze, equal breath sounds bilaterally   HEART: RRR, +S1/S2, no m/r/g   ABDOMEN: Soft, Nontender, Nondistended  EXTREMITIES:  +b/l LE edema  NEUROLOGY: AAOx1(oriented to person not place or time), non-focal, cranial nerves intact  SKIN: Normal color, No rashes or lesions      LABS:                        11.7   10.6  )-----------( 281      ( 2018 06:06 )             34.9     04-24    154<H>  |  113<H>  |  26<H>  ----------------------------<  106<H>  4.2   |  31  |  1.08    Ca    8.9      2018 06:06  Phos  2.4     04-24  Mg     2.3     04-24      PT/INR - ( 2018 06:06 )   PT: 11.7 sec;   INR: 1.08 ratio         PTT - ( 2018 06:06 )  PTT:28.3 sec      Urinalysis Basic - ( 2018 15:22 )    Color: Yellow / Appearance: SL Turbid / S.019 / pH: x  Gluc: x / Ketone: Negative  / Bili: Negative / Urobili: Negative   Blood: x / Protein: 30 mg/dL / Nitrite: Negative   Leuk Esterase: Moderate / RBC: 5-10 /HPF / WBC 10-25 /HPF   Sq Epi: x / Non Sq Epi: x / Bacteria: x        RADIOLOGY & ADDITIONAL TESTS:    Imaging Personally Reviewed:    Consultant(s) Notes Reviewed:  Speech and swallow, PT    Care Discussed with Consultants/Other Providers:

## 2018-04-24 NOTE — PROGRESS NOTE ADULT - PROBLEM SELECTOR PLAN 4
RESOLVED  - Cr. 7.08 is now 1.08, unknown baseline, patient admitted w/ MATTHEW likely postobstructive azotemia 2/2 bilateral hydronephrosis w/ large distended bladder seen on CT imaging  -now resolving s/p cat placement w/ 1.4 liters output  -Cr. improving w/ serial BMPs, will continue to monitor urine output, currently no indication for HD  -closely monitor lytes in setting of postobstructive diuresis   - started flomax. failed TOV x 1,   - c/w tele monitoring   - appreciate nephrology recs.

## 2018-04-24 NOTE — PROGRESS NOTE ADULT - PROBLEM SELECTOR PLAN 1
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

## 2018-04-24 NOTE — PROGRESS NOTE ADULT - PROBLEM SELECTOR PLAN 2
-septic on admission; improved on zosyn (4/21-4/24) of antibiotics will now transition to augmentin (4/24-) to complete 7 day tx.

## 2018-04-24 NOTE — PROGRESS NOTE ADULT - PROBLEM SELECTOR PLAN 10
- therapeutic lovenox due to b/l LE DVT.  Diet: Dysphagia diet    Susan Palma PGY-1  Spectre [phone]  Pager # [phone]/ [phone]

## 2018-04-25 DIAGNOSIS — E43 UNSPECIFIED SEVERE PROTEIN-CALORIE MALNUTRITION: ICD-10-CM

## 2018-04-25 LAB
ANION GAP SERPL CALC-SCNC: 12 MMOL/L — SIGNIFICANT CHANGE UP (ref 5–17)
BUN SERPL-MCNC: 21 MG/DL — SIGNIFICANT CHANGE UP (ref 7–23)
CALCIUM SERPL-MCNC: 8.7 MG/DL — SIGNIFICANT CHANGE UP (ref 8.4–10.5)
CHLORIDE SERPL-SCNC: 111 MMOL/L — HIGH (ref 96–108)
CO2 SERPL-SCNC: 31 MMOL/L — SIGNIFICANT CHANGE UP (ref 22–31)
CREAT SERPL-MCNC: 0.97 MG/DL — SIGNIFICANT CHANGE UP (ref 0.5–1.3)
CULTURE RESULTS: SIGNIFICANT CHANGE UP
CULTURE RESULTS: SIGNIFICANT CHANGE UP
GLUCOSE SERPL-MCNC: 109 MG/DL — HIGH (ref 70–99)
HCT VFR BLD CALC: 36.8 % — LOW (ref 39–50)
HGB BLD-MCNC: 11.9 G/DL — LOW (ref 13–17)
HIV 2 PROVIRAL DNA SERPL QL NAA+PROBE: SIGNIFICANT CHANGE UP
MAGNESIUM SERPL-MCNC: 2.1 MG/DL — SIGNIFICANT CHANGE UP (ref 1.6–2.6)
MCHC RBC-ENTMCNC: 30.7 PG — SIGNIFICANT CHANGE UP (ref 27–34)
MCHC RBC-ENTMCNC: 32.5 GM/DL — SIGNIFICANT CHANGE UP (ref 32–36)
MCV RBC AUTO: 94.6 FL — SIGNIFICANT CHANGE UP (ref 80–100)
PHOSPHATE SERPL-MCNC: 1.7 MG/DL — LOW (ref 2.5–4.5)
PLATELET # BLD AUTO: 335 K/UL — SIGNIFICANT CHANGE UP (ref 150–400)
POTASSIUM SERPL-MCNC: 3.2 MMOL/L — LOW (ref 3.5–5.3)
POTASSIUM SERPL-SCNC: 3.2 MMOL/L — LOW (ref 3.5–5.3)
RBC # BLD: 3.89 M/UL — LOW (ref 4.2–5.8)
RBC # FLD: 12 % — SIGNIFICANT CHANGE UP (ref 10.3–14.5)
SODIUM SERPL-SCNC: 154 MMOL/L — HIGH (ref 135–145)
SPECIMEN SOURCE: SIGNIFICANT CHANGE UP
SPECIMEN SOURCE: SIGNIFICANT CHANGE UP
WBC # BLD: 12.2 K/UL — HIGH (ref 3.8–10.5)
WBC # FLD AUTO: 12.2 K/UL — HIGH (ref 3.8–10.5)

## 2018-04-25 PROCEDURE — 51702 INSERT TEMP BLADDER CATH: CPT

## 2018-04-25 PROCEDURE — 99233 SBSQ HOSP IP/OBS HIGH 50: CPT | Mod: GC

## 2018-04-25 RX ORDER — SODIUM CHLORIDE 9 MG/ML
1000 INJECTION, SOLUTION INTRAVENOUS
Qty: 0 | Refills: 0 | Status: DISCONTINUED | OUTPATIENT
Start: 2018-04-25 | End: 2018-04-26

## 2018-04-25 RX ORDER — POTASSIUM PHOSPHATE, MONOBASIC POTASSIUM PHOSPHATE, DIBASIC 236; 224 MG/ML; MG/ML
15 INJECTION, SOLUTION INTRAVENOUS ONCE
Qty: 0 | Refills: 0 | Status: COMPLETED | OUTPATIENT
Start: 2018-04-25 | End: 2018-04-25

## 2018-04-25 RX ORDER — POTASSIUM CHLORIDE 20 MEQ
40 PACKET (EA) ORAL ONCE
Qty: 0 | Refills: 0 | Status: COMPLETED | OUTPATIENT
Start: 2018-04-25 | End: 2018-04-25

## 2018-04-25 RX ORDER — WARFARIN SODIUM 2.5 MG/1
1 TABLET ORAL
Qty: 30 | Refills: 0 | OUTPATIENT
Start: 2018-04-25 | End: 2018-05-24

## 2018-04-25 RX ORDER — WARFARIN SODIUM 2.5 MG/1
5 TABLET ORAL ONCE
Qty: 0 | Refills: 0 | Status: COMPLETED | OUTPATIENT
Start: 2018-04-25 | End: 2018-04-25

## 2018-04-25 RX ADMIN — SODIUM CHLORIDE 60 MILLILITER(S): 9 INJECTION, SOLUTION INTRAVENOUS at 09:38

## 2018-04-25 RX ADMIN — POTASSIUM PHOSPHATE, MONOBASIC POTASSIUM PHOSPHATE, DIBASIC 62.5 MILLIMOLE(S): 236; 224 INJECTION, SOLUTION INTRAVENOUS at 09:39

## 2018-04-25 RX ADMIN — SODIUM CHLORIDE 60 MILLILITER(S): 9 INJECTION, SOLUTION INTRAVENOUS at 05:18

## 2018-04-25 RX ADMIN — ENOXAPARIN SODIUM 60 MILLIGRAM(S): 100 INJECTION SUBCUTANEOUS at 16:48

## 2018-04-25 RX ADMIN — SODIUM CHLORIDE 85 MILLILITER(S): 9 INJECTION, SOLUTION INTRAVENOUS at 16:48

## 2018-04-25 RX ADMIN — Medication 1 TABLET(S): at 05:18

## 2018-04-25 RX ADMIN — Medication 40 MILLIEQUIVALENT(S): at 09:33

## 2018-04-25 RX ADMIN — WARFARIN SODIUM 5 MILLIGRAM(S): 2.5 TABLET ORAL at 22:12

## 2018-04-25 RX ADMIN — TAMSULOSIN HYDROCHLORIDE 0.4 MILLIGRAM(S): 0.4 CAPSULE ORAL at 22:12

## 2018-04-25 RX ADMIN — ENOXAPARIN SODIUM 60 MILLIGRAM(S): 100 INJECTION SUBCUTANEOUS at 05:18

## 2018-04-25 RX ADMIN — Medication 1 TABLET(S): at 16:48

## 2018-04-25 NOTE — PROGRESS NOTE ADULT - PROBLEM SELECTOR PLAN 4
RESOLVED  - Cr. 7.08 is now 1.08, unknown baseline, patient admitted w/ MATTHEW likely postobstructive azotemia 2/2 bilateral hydronephrosis w/ large distended bladder seen on CT imaging  -now resolving s/p cat placement w/ 1.4 liters output  -Cr. improving w/ serial BMPs, will continue to monitor urine output, currently no indication for HD  -closely monitor lytes in setting of postobstructive diuresis   - started flomax. failed TOV x 1, overnight pulled out Cat due to agitation causing acute bleeding due to trauma, cat back in draining bloody urine  - c/w tele monitoring   - appreciate nephrology recs. RESOLVED  - Cr. 7.08 is now 1.08, unknown baseline, patient admitted w/ MATTHEW likely postobstructive azotemia 2/2 bilateral hydronephrosis w/ large distended bladder seen on CT imaging  -now resolving s/p cat placement w/ 1.4 liters output  -Cr. improving w/ serial BMPs, will continue to monitor urine output, currently no indication for HD  -closely monitor lytes in setting of postobstructive diuresis   - started flomax. failed TOV x 1, overnight pulled out Cat due to agitation causing acute bleeding due to trauma, cat back in place  - c/w tele monitoring   - appreciate nephrology recs.

## 2018-04-25 NOTE — PROGRESS NOTE ADULT - SUBJECTIVE AND OBJECTIVE BOX
Patient is a 86y old  Male who presents with a chief complaint of fall, decreased po intake (2018 02:16)      SUBJECTIVE / OVERNIGHT EVENTS: Overnight patient became agitated and removed cat causing intra urethral trauma leading to acute bleeding seen at urethral meatus. Bladder scan in Am revealed 420cc of urinary retention, urology PA called to aide in placing a cat. Will continue to monitor bleed.    MEDICATIONS  (STANDING):  amoxicillin  875 milliGRAM(s)/clavulanate 1 Tablet(s) Oral two times a day  dextrose 5%. 1000 milliLiter(s) (60 mL/Hr) IV Continuous <Continuous>  enoxaparin Injectable 60 milliGRAM(s) SubCutaneous two times a day  tamsulosin 0.4 milliGRAM(s) Oral at bedtime    MEDICATIONS  (PRN):        CAPILLARY BLOOD GLUCOSE        I&O's Summary    2018 07:01  -  2018 07:00  --------------------------------------------------------  IN: 1000 mL / OUT: 845 mL / NET: 155 mL      Vital Signs Last 24 Hrs  T(C): 36.4 (2018 04:25), Max: 36.6 (2018 20:33)  T(F): 97.5 (2018 04:25), Max: 97.8 (2018 20:33)  HR: 73 (2018 04:25) (73 - 93)  BP: 166/89 (2018 04:25) (158/77 - 169/84)  BP(mean): --  RR: 18 (2018 04:25) (18 - 18)  SpO2: 98% (2018 04:25) (91% - 98%)    PHYSICAL EXAM:  GENERAL: elderly cachectic gentleman, frail, NAD and resting comfortably   HEAD:  Atraumatic, Normocephalic  ENT: EOMI, PERRLA, conjunctiva and sclera clear, Neck supple, No JVD, dry MM   CHEST/LUNG: Pectus excavatum, Clear to auscultation bilaterally; No wheeze, equal breath sounds bilaterally   HEART: RRR, +S1/S2, no m/r/g   ABDOMEN: Soft, Nontender, Nondistended  EXTREMITIES:  +b/l LE edema  NEUROLOGY: AAOx1(oriented to person not place or time), non-focal, cranial nerves intact  : Gross blood noted at urethral meatus w/out blood clots. No gross trauma or skin tear noted.  SKIN: Normal color, No rashes or lesions    LABS:                        11.9   12.2  )-----------( 335      ( 2018 07:11 )             36.8     04-25    154<H>  |  111<H>  |  21  ----------------------------<  109<H>  3.2<L>   |  31  |  0.97    Ca    8.7      2018 07:11  Phos  1.7     04-25  Mg     2.1     04-25      PT/INR - ( 2018 06:06 )   PT: 11.7 sec;   INR: 1.08 ratio         PTT - ( 2018 06:06 )  PTT:28.3 sec      Urinalysis Basic - ( 2018 15:22 )    Color: Yellow / Appearance: SL Turbid / S.019 / pH: x  Gluc: x / Ketone: Negative  / Bili: Negative / Urobili: Negative   Blood: x / Protein: 30 mg/dL / Nitrite: Negative   Leuk Esterase: Moderate / RBC: 5-10 /HPF / WBC 10-25 /HPF   Sq Epi: x / Non Sq Epi: x / Bacteria: x        RADIOLOGY & ADDITIONAL TESTS:    Imaging Personally Reviewed:    Consultant(s) Notes Reviewed:  urology PA    Care Discussed with Consultants/Other Providers:

## 2018-04-25 NOTE — PROGRESS NOTE ADULT - PROBLEM SELECTOR PLAN 8
- PT recommended JOCELYNE  - pending placement RESOLVEd  AG 24 on admission, most likely in setting of uremia ( on admission) and postobstructive MATTHEW. Now improving s/p cat placement. No role for HD at this time.  -c/t to monitor BMP   - c/w indwelling cat

## 2018-04-25 NOTE — SWALLOW BEDSIDE ASSESSMENT ADULT - SWALLOW EVAL: PROGNOSIS
addtnl hx: 4/23/18 Duplex scan; US; Lower extremity: IMPRESSION: There is extensive occlusive thrombus on the right affecting the femoral, deep femoral, common femoral and external iliac veins.  There is partially occlusive thrombus affecting the right popliteal vein.  There is also occlusive or near occlusive thrombus affecting the left

## 2018-04-25 NOTE — SWALLOW BEDSIDE ASSESSMENT ADULT - ASR SWALLOW RECOMMEND DIAG
VFSS/MBS/to objectively assess pharyngeal swallow and r/o aspiration and to determine candidacy for diet upgrade.
Objective testing would not likely change Pt management at this time;

## 2018-04-25 NOTE — PROVIDER CONTACT NOTE (OTHER) - ACTION/TREATMENT ORDERED:
MD made aware. MD to assess patient. Ordered to discontinue cat. Urology called to reinsert new cat catheter. Continue to monitor patient.

## 2018-04-25 NOTE — SWALLOW BEDSIDE ASSESSMENT ADULT - SPECIFY REASON(S)
to subjectively reassess swallowing skills, r/o dysphagia and determine candidacy for diet upgrade.
to subjectively assess swallowing skills and r/o dysphagia.

## 2018-04-25 NOTE — PROGRESS NOTE ADULT - PROBLEM SELECTOR PLAN 1
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

## 2018-04-25 NOTE — SWALLOW BEDSIDE ASSESSMENT ADULT - SLP GENERAL OBSERVATIONS
Pt seated upright in bed; awake, alert and oriented x1; Pt reportedly tolerating the Dysphagia 1 diet with honey thickened liquids however Pt c/o dislike for the thickened liquids.  Pt followed simple directions throughout the evaluation.
Pt seated upright in bed; awake, alert and oriented x1; pleasantly confused with caregiver at /s.  Pt c/o difficulty swallowing on thin liquids.  Pt's  at [facility] and is reporting reduced PO intake since this hospitalization.  Although Pt's caregiver denied h/o dysphagia she does endorse weight loss over the past several months.

## 2018-04-25 NOTE — SWALLOW BEDSIDE ASSESSMENT ADULT - SLP PERTINENT HISTORY OF CURRENT PROBLEM
86 year old cachectic Male w/ no known pmhx presenting s/p fall, found to be hyperkalemic, MATTHEW 2/ 2 obstructive uropathy +/- prerenal component, acute encephalopathy most likely 2/2 to uremia vs progressive dementia, anion gap metabolic acidosis 2/2 uremia and sepsis likely 2/2 CAP. Remains HD stable and clinically improved.
86 year old cachectic Male w/ no known pmhx presenting s/p fall, found to be hyperkalemic, MATTHEW 2/ 2 obstructive uropathy +/- prerenal component, acute encephalopathy most likely 2/2 to uremia vs progressive dementia, anion gap metabolic acidosis 2/2 uremia and sepsis likely 2/2 CAP. Remains HD stable and clinically improved.

## 2018-04-25 NOTE — PROVIDER CONTACT NOTE (OTHER) - ASSESSMENT
Patient A&Ox1, VSS. Benito blood coming from cat insertion site. Patient has been pulling off ID bands and attempting to pull out IVL. Patient has cat inserted for retention.

## 2018-04-25 NOTE — PROGRESS NOTE ADULT - PROBLEM SELECTOR PLAN 9
-PT: recent fall  -wound care consult: stage I ulcer  -nutrition c/s: patient appears cachetic  -s/w c/s: patient's HCP states it is difficult for her to care for him - PT recommended JOCELYNE  - pending placement

## 2018-04-25 NOTE — SWALLOW BEDSIDE ASSESSMENT ADULT - COMMENTS
4/20/18 CXR: IMPRESSION:  Hazy opacity in the right lower lung may be secondary to pneumonia.  CT Abd/Pel and Chest: IMPRESSION: Right lower lobe pneumonia. Small right pleural effusion.  Mild bilateral hydroureteronephrosis and distended urinary bladder.    4/21/18 CT Brain: IMPRESSION: No acute hemorrhage or mass effect. Prominent ventricles may be related to central atrophy. Correlate with   symptoms to exclude normal pressure hydrocephalus.  4/23/18 Per MD f/u: Pneumonia of right lower lobe due to infectious organism.  Plan: -septic on admission; improved on zosyn day 4 of 5 of antibiotics; - zosyn for possible aspiration pna given AMS PTA.  Hyperkalemia; Resolved. K+8.4 on admission, EKG w/ no evidence of peaked T waves; hyperkalemia most likely 2/2 MATTHEW which is 2/2 obstructive uropathy (seen on CT A/P) now s/p cat placement. 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 nutritional deficiencies. CT head w/out e/o hemorrhage or mass, although concerning for NPH.  Metabolic acidosis, increased anion gap; now improving s/p cat placement. No role for HD at this time.   Wound care consult: stage I ulcer; nutrition c/s: patient appears cachetic; s/w c/s: patient's HCP states it is difficult for her to care for him.    4/23/18 CT abd/pel, CT chest; IMPRESSION: Small right pleural effusion, unchanged.  Parenchymal opacity in the right lower lobe, unchanged. The differential diagnosis includes infection as well as neoplasm.  Indeterminate peripheral irregular opacity in the right middle lobe.  Large left inguinal hernia containing nonobstructed bowel and small amount of fluid.  Deep venous thrombosis is again noted involving the right external iliac and femoral veins as noted on Doppler examination performed the same day.  Resolution of previously noted bilateral hydronephrosis. 4/20/18 CXR: IMPRESSION:  Hazy opacity in the right lower lung may be secondary to pneumonia.  CT Abd/Pel and Chest: IMPRESSION: Right lower lobe pneumonia. Small right pleural effusion.  Mild bilateral hydroureteronephrosis and distended urinary bladder.    4/21/18 CT Brain: IMPRESSION: No acute hemorrhage or mass effect. Prominent ventricles may be related to central atrophy. Correlate with   symptoms to exclude normal pressure hydrocephalus.  4/23/18 Per MD f/u: Pneumonia of right lower lobe due to infectious organism.  Plan: -septic on admission; improved on zosyn day 4 of 5 of antibiotics; - zosyn for possible aspiration pna given AMS PTA.  Hyperkalemia; Resolved. K+8.4 on admission, EKG w/ no evidence of peaked T waves; hyperkalemia most likely 2/2 MATTHEW which is 2/2 obstructive uropathy (seen on CT A/P) now s/p cat placement. 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 nutritional deficiencies. CT head w/out e/o hemorrhage or mass, although concerning for NPH.  Metabolic acidosis, increased anion gap; now improving s/p cat placement. No role for HD at this time.   Wound care consult: stage I ulcer; nutrition c/s: patient appears cachetic; s/w c/s: patient's HCP states it is difficult for her to care for him.    4/23/18 CT abd/pel, CT chest; IMPRESSION: Small right pleural effusion, unchanged.  Parenchymal opacity in the right lower lobe, unchanged. The differential diagnosis includes infection as well as neoplasm.  Indeterminate peripheral irregular opacity in the right middle lobe.  Large left inguinal hernia containing nonobstructed bowel and small amount of fluid.  Deep venous thrombosis is again noted involving the right external iliac and femoral veins as noted on Doppler examination performed the same day.  Resolution of previously noted bilateral hydronephrosis.  US dopplers;

## 2018-04-25 NOTE — PROGRESS NOTE ADULT - PROBLEM SELECTOR PLAN 10
- therapeutic lovenox due to b/l LE DVT.  Diet: Dysphagia diet    Susan Palma PGY-1  Spectre [id number]  Pager # [phone]/ [phone] - therapeutic lovenox due to b/l LE DVT will transition to coumadin vs DOAC  Diet: Dysphagia diet    Susan Palma PGY-1  Spectre [id number]  Pager # [phone]/ [phone]

## 2018-04-25 NOTE — CHART NOTE - NSCHARTNOTEFT_GEN_A_CORE
Source: Patient [x ]    Family [ ]     other [ x] chart this admission, no other admissions available  seen for malnutrition follow up  admitted for chief complaint of fall, decreased po intake   Patient remains acute,  overnight patient became agitated and removed Junior causing trauma and acute bleeding, Junior to be replaced.     Diet : DYS1HC      Patient reports [ ] nausea  [ ] vomiting [ ] diarrhea [ ] constipation  [ ]chewing problems [ ] swallowing issues  [x ] other: consuming >75% intake. he dislikes vanilla health shakes-agrees to other chocolate flavored shakes.      PO intake:  < 50% [ ] 50-75% [ ]   % [ x]  other :     Source for PO intake [ x] Patient [ ] family [ ] chart [ ] staff [ ] other     Enteral /Parenteral Nutrition: N/A      Current Weight:57kg  % Weight Change: weight stable since initial daily weight on 4/22    Pertinent Medications: MEDICATIONS  (STANDING):  amoxicillin  875 milliGRAM(s)/clavulanate 1 Tablet(s) Oral two times a day  dextrose 5%. 1000 milliLiter(s) (60 mL/Hr) IV Continuous <Continuous>  enoxaparin Injectable 60 milliGRAM(s) SubCutaneous two times a day  tamsulosin 0.4 milliGRAM(s) Oral at bedtime    MEDICATIONS  (PRN):    Pertinent Labs:  04-25 Na154 mmol/L<H> Glu 109 mg/dL<H> K+ 3.2 mmol/L<L> Cr  0.97 mg/dL BUN 21 mg/dL 04-25 Phos 1.7 mg/dL<L> 04-21 Alb 3.5 g/dL                  Skin: +2 right leg/ankle/foot, stage 1 sacrum    Estimated Needs:   [ x] no change since previous assessment  [ ] recalculated:       Previous Nutrition Diagnosis:     [ ] Inadequate Energy Intake [ ]Inadequate Oral Intake [ ] Excessive Energy Intake     [ ] Underweight [ ] Increased Nutrient Needs [ ] Overweight/Obesity     [ ] Altered GI Function [ ] Unintended Weight Loss [ ] Food & Nutrition Related Knowledge Deficit [ x] Malnutrition -severe         Nutrition Diagnosis is [x ] ongoing  [ ] resolved [ ] not applicable -being addressed with supplements         New Nutrition Diagnosis: [ x] not applicable    [ ] Inadequate Protein Energy Intake [ ]Inadequate Oral Intake [ ] Excessive Energy Intake     [ ] Underweight [ ] Increased Nutrient Needs [ ] Overweight/Obesity     [ ] Altered GI Function [ ] Unintended Weight Loss [ ] Food & Nutrition Related Knowledge Deficit[ ] Limited Adherence to nutrition related recommendations [ ] Malnutrition  [ ] other: Free text       Related to:      As evidenced by:      Interventions:     Recommend    [x] Change Diet To: DYS1HC/LOWSODIUM (Na is elevated)    [ x] Nutrition Supplement-discontinue vanilla health shakes and recommend ensure enlive x 2 daily    [ ] Nutrition Support    [ ] Other:        Monitoring and Evaluation:     [ x] PO intake [x ] Tolerance to diet prescription [ x] weights [x ] follow up per protocol    [ ] other: Source: Patient [x ]    Family [ ]     other [ x] chart this admission, no other admissions available  seen for malnutrition follow up  admitted for chief complaint of fall, decreased po intake   Patient remains acute,  overnight patient became agitated and removed Junior causing trauma and acute bleeding, Junior to be replaced.     Diet : DYS1HC      Patient reports [ ] nausea  [ ] vomiting [ ] diarrhea [ ] constipation  [ ]chewing problems [ ] swallowing issues  [x ] other: consuming >75% intake. he dislikes vanilla health shakes-agrees to other chocolate flavored shakes.      PO intake:  < 50% [ ] 50-75% [ ]   % [ x]  other :     Source for PO intake [ x] Patient [ ] family [ ] chart [ ] staff [ ] other     Enteral /Parenteral Nutrition: N/A      Current Weight:57kg  % Weight Change: weight stable since initial daily weight on 4/22    Pertinent Medications: MEDICATIONS  (STANDING):  amoxicillin  875 milliGRAM(s)/clavulanate 1 Tablet(s) Oral two times a day  dextrose 5%. 1000 milliLiter(s) (60 mL/Hr) IV Continuous <Continuous>  enoxaparin Injectable 60 milliGRAM(s) SubCutaneous two times a day  tamsulosin 0.4 milliGRAM(s) Oral at bedtime    MEDICATIONS  (PRN):    Pertinent Labs:  04-25 Na154 mmol/L<H> Glu 109 mg/dL<H> K+ 3.2 mmol/L<L> Cr  0.97 mg/dL BUN 21 mg/dL 04-25 Phos 1.7 mg/dL<L> 04-21 Alb 3.5 g/dL                  Skin: +2 right leg/ankle/foot, stage 1 sacrum    Estimated Needs:   [ x] no change since previous assessment  [ ] recalculated:       Previous Nutrition Diagnosis:     [ ] Inadequate Energy Intake [ ]Inadequate Oral Intake [ ] Excessive Energy Intake     [ ] Underweight [ ] Increased Nutrient Needs [ ] Overweight/Obesity     [ ] Altered GI Function [ ] Unintended Weight Loss [ ] Food & Nutrition Related Knowledge Deficit [ x] Malnutrition -severe         Nutrition Diagnosis is [x ] ongoing  [ ] resolved [ ] not applicable -being addressed with supplements         New Nutrition Diagnosis: [ x] not applicable    [ ] Inadequate Protein Energy Intake [ ]Inadequate Oral Intake [ ] Excessive Energy Intake     [ ] Underweight [ ] Increased Nutrient Needs [ ] Overweight/Obesity     [ ] Altered GI Function [ ] Unintended Weight Loss [ ] Food & Nutrition Related Knowledge Deficit[ ] Limited Adherence to nutrition related recommendations [ ] Malnutrition  [ ] other: Free text       Related to:      As evidenced by:      Interventions:     Recommend    [x] Change Diet To: DYS1HC/LOWSODIUM (Na is elevated)    [ x] Nutrition Supplement-discontinue vanilla health shakes and recommend ensure enlive x 2 daily    [ ] Nutrition Support    [x ] Other: MVI daily       Monitoring and Evaluation:     [ x] PO intake [x ] Tolerance to diet prescription [ x] weights [x ] follow up per protocol    [ ] other: Source: Patient [x ]    Family [ ]     other [ x] chart this admission, no other admissions available  seen for malnutrition follow up  admitted for chief complaint of fall, decreased po intake   Patient remains acute,  overnight patient became agitated and removed Junior causing trauma and acute bleeding, Junior to be replaced.     Diet : DYS1HC      Patient reports [ ] nausea  [ ] vomiting [ ] diarrhea [ ] constipation  [ ]chewing problems [ ] swallowing issues  [x ] other: consuming >75% intake. he dislikes vanilla health shakes-agrees to other chocolate flavored shakes.      PO intake:  < 50% [ ] 50-75% [ ]   % [ x]  other :     Source for PO intake [ x] Patient [ ] family [ ] chart [ ] staff [ ] other     Enteral /Parenteral Nutrition: N/A      Current Weight:57kg  % Weight Change: weight stable since initial daily weight on 4/22    Pertinent Medications: MEDICATIONS  (STANDING):  amoxicillin  875 milliGRAM(s)/clavulanate 1 Tablet(s) Oral two times a day  dextrose 5%. 1000 milliLiter(s) (60 mL/Hr) IV Continuous <Continuous>  enoxaparin Injectable 60 milliGRAM(s) SubCutaneous two times a day  tamsulosin 0.4 milliGRAM(s) Oral at bedtime    MEDICATIONS  (PRN):    Pertinent Labs:  04-25 Na154 mmol/L<H> Glu 109 mg/dL<H> K+ 3.2 mmol/L<L> Cr  0.97 mg/dL BUN 21 mg/dL 04-25 Phos 1.7 mg/dL<L> 04-21 Alb 3.5 g/dL                  Skin: +2 right leg/ankle/foot, stage 1 sacrum    Estimated Needs:   [ x] no change since previous assessment  [ ] recalculated:       Previous Nutrition Diagnosis:     [ ] Inadequate Energy Intake [ ]Inadequate Oral Intake [ ] Excessive Energy Intake     [ ] Underweight [ ] Increased Nutrient Needs [ ] Overweight/Obesity     [ ] Altered GI Function [ ] Unintended Weight Loss [ ] Food & Nutrition Related Knowledge Deficit [ x] Malnutrition -severe         Nutrition Diagnosis is [x ] ongoing  [ ] resolved [ ] not applicable -being addressed with supplements         New Nutrition Diagnosis: [ x] not applicable    [ ] Inadequate Protein Energy Intake [ ]Inadequate Oral Intake [ ] Excessive Energy Intake     [ ] Underweight [ ] Increased Nutrient Needs [ ] Overweight/Obesity     [ ] Altered GI Function [ ] Unintended Weight Loss [ ] Food & Nutrition Related Knowledge Deficit[ ] Limited Adherence to nutrition related recommendations [ ] Malnutrition  [ ] other: Free text       Related to:      As evidenced by:      Interventions:     Recommend    [x] Change Diet To: DYS1HC/LOWSODIUM (Na is elevated)    [ x] Nutrition Supplement-discontinue vanilla health shakes and recommend ensure enlive x 2 daily, provided chocolate magic cup for lunch and dinner    [ ] Nutrition Support    [x ] Other: MVI daily       Monitoring and Evaluation:     [ x] PO intake [x ] Tolerance to diet prescription [ x] weights [x ] follow up per protocol    [ ] other:

## 2018-04-25 NOTE — SWALLOW BEDSIDE ASSESSMENT ADULT - SWALLOW EVAL: RECOMMENDED FEEDING/EATING TECHNIQUES
crush medication (when feasible)/small sips/bites/maintain upright posture during/after eating for 30 mins/oral hygiene/position upright (90 degrees)/allow for swallow between intakes

## 2018-04-25 NOTE — SWALLOW BEDSIDE ASSESSMENT ADULT - ASR SWALLOW DENTITION
lower dentures did not fit appropriately despite use of adhesive; dentures were removed by this clinician.
edentulous, was not wearing lower dentures.

## 2018-04-25 NOTE — SWALLOW BEDSIDE ASSESSMENT ADULT - ORAL PREPARATORY PHASE
Within functional limits suspect a-p spillage with larger bolus size Pt refused trial as he c/o dislike of food provided.

## 2018-04-25 NOTE — PROGRESS NOTE ADULT - PROBLEM SELECTOR PLAN 7
RESOLVEd  AG 24 on admission, most likely in setting of uremia ( on admission) and postobstructive MATTHEW. Now improving s/p cat placement. No role for HD at this time.  -c/t to monitor BMP   - c/w indwelling cat - Dietician evaluated patient  -s/w c/s: patient's HCP states it is difficult for her to care for him  - will cont w/ Dysphagia diet.

## 2018-04-25 NOTE — SWALLOW BEDSIDE ASSESSMENT ADULT - CONSISTENCIES ADMINISTERED
honey thick/puree thick/Pt preferred chocolate pudding and c/o dislike for vanila. thin liquid nectar thick mech soft

## 2018-04-25 NOTE — PROGRESS NOTE ADULT - ATTENDING COMMENTS
I was physically present for the key portions of the evaluation and management (E/M) service provided.  I agree with the above history, physical, and plan which I have reviewed and edited where appropriate. 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 Cursory malignancy workup with CT c/a/p reveals RLL opacity infection vs. tumor. Pt being treated with augmentin for possible aspiration pna. d/w HCP, who does not agree to lung biopsy to r/o malignancy at this time and would like to repeat CT scan in 4-6 weeks to monitor progression. Pt also with urinary retention requiring cat and will need OP urology f/u. Pending rehab placement once HCP provides list to CM.

## 2018-04-25 NOTE — PROCEDURE NOTE - ADDITIONAL PROCEDURE DETAILS
Called to place cat in male pt who self dcd this am. 20f coude placed easily and aseptically without event. 400cc of yellow urine drained. tov per primary team.  20 CC OF WATER WAS PLACED IN THE BALLOON TO PREVENT FURTHER SELF REMOVAL. ALL OF THE WATER MUST BE REMOVED WHEN HIS CAT IS TO COME OUT. Called to place cat in male pt who self dcd this am. 20f coude placed easily and aseptically without event. 400cc of yellow urine drained. tov per primary team.  20 CC OF WATER WAS PLACED IN THE BALLOON TO PREVENT FURTHER SELF REMOVAL. ALL OF THE WATER MUST BE REMOVED WHEN HIS CAT IS TO COME OUT.  sanjiv biswas

## 2018-04-26 ENCOUNTER — TRANSCRIPTION ENCOUNTER (OUTPATIENT)
Age: 83
End: 2018-04-26

## 2018-04-26 PROBLEM — Z00.00 ENCOUNTER FOR PREVENTIVE HEALTH EXAMINATION: Status: ACTIVE | Noted: 2018-04-26

## 2018-04-26 LAB
ANION GAP SERPL CALC-SCNC: 10 MMOL/L — SIGNIFICANT CHANGE UP (ref 5–17)
APTT BLD: 34.1 SEC — SIGNIFICANT CHANGE UP (ref 27.5–37.4)
BUN SERPL-MCNC: 18 MG/DL — SIGNIFICANT CHANGE UP (ref 7–23)
CALCIUM SERPL-MCNC: 9 MG/DL — SIGNIFICANT CHANGE UP (ref 8.4–10.5)
CHLORIDE SERPL-SCNC: 107 MMOL/L — SIGNIFICANT CHANGE UP (ref 96–108)
CO2 SERPL-SCNC: 30 MMOL/L — SIGNIFICANT CHANGE UP (ref 22–31)
CREAT SERPL-MCNC: 0.98 MG/DL — SIGNIFICANT CHANGE UP (ref 0.5–1.3)
GLUCOSE SERPL-MCNC: 127 MG/DL — HIGH (ref 70–99)
HCT VFR BLD CALC: 37.3 % — LOW (ref 39–50)
HGB BLD-MCNC: 12.1 G/DL — LOW (ref 13–17)
INR BLD: 1.05 RATIO — SIGNIFICANT CHANGE UP (ref 0.88–1.16)
MAGNESIUM SERPL-MCNC: 2 MG/DL — SIGNIFICANT CHANGE UP (ref 1.6–2.6)
MCHC RBC-ENTMCNC: 30.6 PG — SIGNIFICANT CHANGE UP (ref 27–34)
MCHC RBC-ENTMCNC: 32.3 GM/DL — SIGNIFICANT CHANGE UP (ref 32–36)
MCV RBC AUTO: 94.8 FL — SIGNIFICANT CHANGE UP (ref 80–100)
PHOSPHATE SERPL-MCNC: 1.8 MG/DL — LOW (ref 2.5–4.5)
PLATELET # BLD AUTO: 343 K/UL — SIGNIFICANT CHANGE UP (ref 150–400)
POTASSIUM SERPL-MCNC: 4 MMOL/L — SIGNIFICANT CHANGE UP (ref 3.5–5.3)
POTASSIUM SERPL-SCNC: 4 MMOL/L — SIGNIFICANT CHANGE UP (ref 3.5–5.3)
PROTHROM AB SERPL-ACNC: 11.3 SEC — SIGNIFICANT CHANGE UP (ref 9.8–12.7)
RBC # BLD: 3.94 M/UL — LOW (ref 4.2–5.8)
RBC # FLD: 12.1 % — SIGNIFICANT CHANGE UP (ref 10.3–14.5)
SODIUM SERPL-SCNC: 147 MMOL/L — HIGH (ref 135–145)
WBC # BLD: 12.6 K/UL — HIGH (ref 3.8–10.5)
WBC # FLD AUTO: 12.6 K/UL — HIGH (ref 3.8–10.5)

## 2018-04-26 PROCEDURE — 74230 X-RAY XM SWLNG FUNCJ C+: CPT | Mod: 26

## 2018-04-26 PROCEDURE — 99233 SBSQ HOSP IP/OBS HIGH 50: CPT | Mod: GC

## 2018-04-26 RX ORDER — SODIUM,POTASSIUM PHOSPHATES 278-250MG
1 POWDER IN PACKET (EA) ORAL
Qty: 0 | Refills: 0 | Status: DISCONTINUED | OUTPATIENT
Start: 2018-04-26 | End: 2018-04-26

## 2018-04-26 RX ORDER — SODIUM CHLORIDE 9 MG/ML
1000 INJECTION, SOLUTION INTRAVENOUS
Qty: 0 | Refills: 0 | Status: DISCONTINUED | OUTPATIENT
Start: 2018-04-26 | End: 2018-04-26

## 2018-04-26 RX ORDER — SODIUM,POTASSIUM PHOSPHATES 278-250MG
1 POWDER IN PACKET (EA) ORAL
Qty: 0 | Refills: 0 | Status: COMPLETED | OUTPATIENT
Start: 2018-04-26 | End: 2018-04-27

## 2018-04-26 RX ORDER — POTASSIUM CHLORIDE 20 MEQ
40 PACKET (EA) ORAL EVERY 4 HOURS
Qty: 0 | Refills: 0 | Status: DISCONTINUED | OUTPATIENT
Start: 2018-04-26 | End: 2018-04-26

## 2018-04-26 RX ORDER — WARFARIN SODIUM 2.5 MG/1
5 TABLET ORAL ONCE
Qty: 0 | Refills: 0 | Status: COMPLETED | OUTPATIENT
Start: 2018-04-26 | End: 2018-04-26

## 2018-04-26 RX ORDER — SODIUM CHLORIDE 9 MG/ML
1000 INJECTION, SOLUTION INTRAVENOUS
Qty: 0 | Refills: 0 | Status: DISCONTINUED | OUTPATIENT
Start: 2018-04-26 | End: 2018-04-27

## 2018-04-26 RX ADMIN — SODIUM CHLORIDE 85 MILLILITER(S): 9 INJECTION, SOLUTION INTRAVENOUS at 05:17

## 2018-04-26 RX ADMIN — WARFARIN SODIUM 5 MILLIGRAM(S): 2.5 TABLET ORAL at 22:09

## 2018-04-26 RX ADMIN — SODIUM CHLORIDE 60 MILLILITER(S): 9 INJECTION, SOLUTION INTRAVENOUS at 12:57

## 2018-04-26 RX ADMIN — Medication 1 TABLET(S): at 05:17

## 2018-04-26 RX ADMIN — Medication 1 TABLET(S): at 19:30

## 2018-04-26 RX ADMIN — ENOXAPARIN SODIUM 60 MILLIGRAM(S): 100 INJECTION SUBCUTANEOUS at 19:29

## 2018-04-26 RX ADMIN — Medication 1 TABLET(S): at 19:28

## 2018-04-26 RX ADMIN — TAMSULOSIN HYDROCHLORIDE 0.4 MILLIGRAM(S): 0.4 CAPSULE ORAL at 22:09

## 2018-04-26 RX ADMIN — Medication 1 TABLET(S): at 12:57

## 2018-04-26 RX ADMIN — ENOXAPARIN SODIUM 60 MILLIGRAM(S): 100 INJECTION SUBCUTANEOUS at 05:17

## 2018-04-26 NOTE — SWALLOW VFSS/MBS ASSESSMENT ADULT - BEHAVIORAL MODIFICATIONS
Alternating textures attempted to reduce pharyngeal residue although laryngeal penetration with incomplete retrieval persisted; Pt did not maintain postures during the exam.

## 2018-04-26 NOTE — SWALLOW VFSS/MBS ASSESSMENT ADULT - ADDITIONAL INFORMATION
Pt awake, alert and cooperative; tolerated upright position in GREG chair; reduced recall for suggested strategies as the exam continued.  Reduced epiglottic retroflexion; reduced hyolaryngeal excursion.

## 2018-04-26 NOTE — SWALLOW VFSS/MBS ASSESSMENT ADULT - THE ABOVE FINDINGS WERE DISCUSSED WITH
SLP counselled Pt on findings and aspiration risks although Pt unable to demonstrate comprehension information provided./Patient Patient/SLP counselled Pt on findings and aspiration risks although Pt unable to demonstrate comprehension information provided.  Per additional conversation with MD who spoke with Pt/HCP re: aspiration risks and findings/recd of MBS, the Pt is receive pleasure feeds at this time.

## 2018-04-26 NOTE — DISCHARGE NOTE ADULT - HOSPITAL COURSE
87 y/o cachectic Male w/ no known pmhx presenting s/p fall, found to be hyperkalemic, MATTHEW 2/ 2 obstructive uropathy d/t BPH resolved with cat placement and briefly on bicarb gtts, acute encephalopathy most likely 2/2 to uremia +/- dementia. CT head w/out e/o hemorrhage or mass, although concerning for NPH. B12, folate, TSH, RPR, and HIV normal. Course complicated by sepsis d/t pna s/p zosyn (4/21-4/24), augmentin (4/24-4/26) to complete 7 day tx. Also had RLE swelling, LE duplex studies consistent with bilateral DVTs started on coumadin as HCP couldn't offered DOACs. INR 2.83 at discharge, continue coumadin 3 mg, and dose according to INR every other day at rehab. Patient has severe malnutrition as per dietician evaluation, continue ensure twice a day. Modified Barium swallow study revealed silent aspiration, GOC discussion with HCP decided to continue soft diet as patient enjoys eating, aware of risks of aspiration. Will need CT chest in 6 weeks after hospital discharge to evaluate interval improvement of PNA. Gave patient and HCP phone number to obtain [facility] PCP, personally tried making an appointment but nurse stated patient has to call with personal information such as email and other info. HCP wants patient at a long term facility care as no longer able to care for him at home. New mental status baseline of AAO X 1-2 to name and place not time. Stable to discharge to rehab transition to long term facility care. 87 y/o cachectic Male w/ no known pmhx presenting s/p fall, found to be hyperkalemic, MATTHEW 2/ 2 obstructive uropathy d/t BPH resolved with cat placement and briefly on bicarb gtts, acute encephalopathy most likely 2/2 to uremia +/- dementia. CT head w/out e/o hemorrhage or mass, although concerning for NPH. B12, folate, TSH, RPR, and HIV normal. Course complicated by sepsis d/t pna s/p zosyn (4/21-4/24), augmentin (4/24-4/26) to complete 7 day tx. Also had RLE swelling, LE duplex studies consistent with bilateral DVTs started on coumadin as HCP couldn't offered DOACs. INR 2.83 at discharge, continue coumadin 3 mg, and dose according to INR every other day at rehab. Patient has severe malnutrition as per dietician evaluation, continue ensure twice a day. Modified Barium swallow study revealed silent aspiration, GOC discussion with HCP decided to continue soft diet as patient enjoys eating, aware of risks of aspiration. Will need CT chest in 6 weeks after hospital discharge to evaluate interval improvement of PNA. Gave patient and HCP phone number to obtain [facility] PCP, personally tried making an appointment but nurse stated patient has to call with personal information such as email and other info. HCP wants patient at a long term facility care as no longer able to care for him at home. New mental status baseline of AAO X 1-2 to name and place not time. Stable to discharge to rehab transition to long term facility care.    Upon D/C from rehab will need to obtain long term care with primary care physician for monitoring of INR.

## 2018-04-26 NOTE — DISCHARGE NOTE ADULT - PATIENT PORTAL LINK FT
You can access the [facility] Patient Portal, offered by [facility], by registering with the following website: [email or web address]

## 2018-04-26 NOTE — GOALS OF CARE CONVERSATION - PERSONAL ADVANCE DIRECTIVE - CONVERSATION DETAILS
Patient diagnosed with aspiration Pneumonia. A MBS study revealed silent aspiration. Spoke with HCP as patient is AAOx1 to name not place or time. Explained to HCP options moving forward included PEG, pleasure feeds, NPO. Went over in detail the risk os aspiration remains with a PEG and pleasure feeds. NPO is unsustainable with life. HCP decided wanted to choose pleasure feeds as patient enjoys food. Understand risk of aspiration. Still wants patient to be full code. Patient diagnosed with aspiration Pneumonia. A MBS study revealed silent aspiration. Spoke with HCP as patient is AAOx1 to name not place or time. Explained to HCP options moving forward included PEG, pleasure feeds, NPO. Went over in detail the risk of aspiration remains with a PEG and pleasure feeds. NPO is unsustainable with life. HCP decided wanted to choose pleasure feeds as patient enjoys food. Understand risk of aspiration. Still wants patient to be full code.

## 2018-04-26 NOTE — PROGRESS NOTE ADULT - PROBLEM SELECTOR PLAN 4
RESOLVED  - Cr. 7.08 is now 1.08, unknown baseline, patient admitted w/ MATTHEW likely postobstructive azotemia 2/2 bilateral hydronephrosis w/ large distended bladder seen on CT imaging  -now resolving s/p cat placement w/ 1.4 liters output  -Cr. improving w/ serial BMPs, will continue to monitor urine output, currently no indication for HD  -closely monitor lytes in setting of postobstructive diuresis   - started flomax. failed TOV x 1, overnight pulled out Cat due to agitation causing acute bleeding due to trauma, cat back in place  - c/w tele monitoring   - appreciate nephrology recs.

## 2018-04-26 NOTE — CONSULT NOTE ADULT - SUBJECTIVE AND OBJECTIVE BOX
pt is an 87 y/o seen for long thick nails  pedal pulses nonpalpable, TG WNL, CFT 3 sec x 10  epicritic sensation intact  nails elongated and thickened x 10 with subungual debris left hallux nail border incurvated no maceration no open lesions  contracted digits  Imp PVD,        HT       ingrown nail  Plan; Pt eval         aseptic debridement of nails x 10            debridement of offending hallux nail border         educated pt on proper pedal care          thank you for consult          reconsult PRN

## 2018-04-26 NOTE — DISCHARGE NOTE ADULT - MEDICATION SUMMARY - MEDICATIONS TO TAKE
I will START or STAY ON the medications listed below when I get home from the hospital:    tamsulosin 0.4 mg oral capsule  -- 1 cap(s) by mouth once a day (at bedtime)  -- Indication: For BPH    warfarin 3 mg oral tablet  -- 1 tab(s) by mouth once a day atleast 3 months  -- Indication: For Deep vein thrombosis (DVT) of distal vein of both lower extremities, unspecified chronicity

## 2018-04-26 NOTE — SWALLOW VFSS/MBS ASSESSMENT ADULT - COMMENTS
4/20/18 CXR: IMPRESSION:  Hazy opacity in the right lower lung may be secondary to pneumonia.  CT Abd/Pel and Chest: IMPRESSION: Right lower lobe pneumonia. Small right pleural effusion.  Mild bilateral hydroureteronephrosis and distended urinary bladder.    4/21/18 CT Brain: IMPRESSION: No acute hemorrhage or mass effect. Prominent ventricles may be related to central atrophy. Correlate with   symptoms to exclude normal pressure hydrocephalus.  4/23/18 Per MD f/u: Pneumonia of right lower lobe due to infectious organism.  Plan: -septic on admission; improved on zosyn day 4 of 5 of antibiotics; - zosyn for possible aspiration pna given AMS PTA.  Hyperkalemia; Resolved. K+8.4 on admission, EKG w/ no evidence of peaked T waves; hyperkalemia most likely 2/2 MATTHEW which is 2/2 obstructive uropathy (seen on CT A/P) now s/p cta placement. 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 nutritional deficiencies. CT head w/out e/o hemorrhage or mass, although concerning for NPH.  Metabolic acidosis, increased anion gap; now improving s/p cat placement. No role for HD at this time.   Wound care consult: stage I ulcer; nutrition c/s: patient appears cachetic; s/w c/s: patient's HCP states it is difficult for her to care for him.    4/23/18 CT abd/pel, CT chest; IMPRESSION: Small right pleural effusion, unchanged.  Parenchymal opacity in the right lower lobe, unchanged. The differential diagnosis includes infection as well as neoplasm.  Indeterminate peripheral irregular opacity in the right middle lobe.  Large left inguinal hernia containing nonobstructed bowel and small amount of fluid.  Deep venous thrombosis is again noted involving the right external iliac and femoral veins as noted on Doppler examination performed the same day.  Resolution of previously noted bilateral hydronephrosis.  US dopplers;

## 2018-04-26 NOTE — SWALLOW VFSS/MBS ASSESSMENT ADULT - PHARYNGEAL PHASE COMMENTS
tspn of honey thickened liquids provided to assist with oral clearing resulting in aspiration of thick puree residue intermixed with tspn of honey thickened liquids.

## 2018-04-26 NOTE — DISCHARGE NOTE ADULT - CARE PLAN
Principal Discharge DX:	Uremic encephalopathy  Goal:	Resolved with cat  Assessment and plan of treatment:	You came in with confusion to the hospital. We think this is because you weren't urinating appropriately due to your prostate being enlarged. We placed a cat catheter that drains urine from your bladder. This helped your kidney function to improve. It is likely you will need cat for a long time until you start urinating on your own.  Secondary Diagnosis:	Deep vein thrombosis (DVT) of distal vein of both lower extremities, unspecified chronicity  Assessment and plan of treatment:	You have blood clots in your vein. Therefore you will need to take coumadin (warfarin) daily for 3-6 months. Your primary care doctor can assess if you need to continue on coumadin for more than 3 - 6 months. Please call [phone] to make an appointment with a [facility] primary care doctor. There is a risk of bleeding with this medication therefore if you notice blood in your stools or urine or vomit blood please obtain medical care immediately.  Secondary Diagnosis:	Silent aspiration, initial encounter  Assessment and plan of treatment:	You have aspiration when you eat food. This means that some times when you eat it goes down the pharynx and to trachea putting you at risk for lung infection. We explained risk and benefits and other alternatives to your HCP Charu. She agreed the best course is to let you eat food as you enjoy it. If you develop fevers, chills, cough please seek medical care.  Secondary Diagnosis:	Pneumonia  Assessment and plan of treatment:	You had pneumonia when you came to the hospital. We treated you with antibiotics. We need you to get a CT chest (cat scan of your chest) in 6 weeks to see your pneumonia is improving. Please let your primary care doctor know.  Secondary Diagnosis:	Severe malnutrition  Assessment and plan of treatment:	Please drink ensure twice daily for severe malnutrition

## 2018-04-26 NOTE — PROGRESS NOTE ADULT - PROBLEM SELECTOR PLAN 7
- Dietician evaluated patient  -s/w c/s: patient's HCP states it is difficult for her to care for him  - will cont w/ Dysphagia diet.

## 2018-04-26 NOTE — SWALLOW VFSS/MBS ASSESSMENT ADULT - DIAGNOSTIC IMPRESSIONS
Pt presents with significant oropharyngeal dysphagia primarily characterized by laryngeal penetration and silent aspiration on the most conservative textures. Pt deemed to be at risk for aspiration with any PO intake.  Pharyngeal residue is evident post swallow on purees and honey thickened liquids.  Pt is not deemed to be a candidate for compensatory postures or strategies.  Disorders: reduced lingual strength/ROM/Rate of motion, reduced BOT to posterior pharyngeal wall contact, delay in trigger of the swallow reflex, reduced hyo-laryngeal excursion, reduced laryngeal closure, reduced pharyngeal contractility, reduced supraglottic sensation, and reduced subglottic sensation.

## 2018-04-26 NOTE — PROGRESS NOTE ADULT - ATTENDING COMMENTS
I was physically present for the key portions of the evaluation and management (E/M) service provided.  I agree with the above history, physical, and plan which I have reviewed and edited where appropriate. Pt clinically stable awaiting rehab placement. Dose coumadin today, patient to complete lovenox bridge to coumadin at rehab. Found to have microaspiration, however discussed with HCP who would like to keep patient on pleasure feeds. Also discussed R lung lob lesion seen on CT scan infection vs. malignancy- HCP understands and would like to repeat CT scan in 6 weeks prior to pursuing biopsy. She understands the risks associated with deferring, including possible progression of undiagnosed cancer. Pt also with urinary retention, to go to rehab with cat and will f/u with urology as OP.

## 2018-04-26 NOTE — PROGRESS NOTE ADULT - PROBLEM SELECTOR PLAN 1
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

## 2018-04-26 NOTE — SWALLOW VFSS/MBS ASSESSMENT ADULT - RECOMMENDED CONSISTENCY
NPO, with non-oral nutrition/hydration/medications with MD f/u to establish GOC re: provision of nutrition and hydration in this Pt and to determine candidacy for Palliative care consult.

## 2018-04-26 NOTE — SWALLOW VFSS/MBS ASSESSMENT ADULT - BASELINE SWALLOW
HX  cont: addtnl hx: 4/23/18 Duplex scan; US; Lower extremity: IMPRESSION: There is extensive occlusive thrombus on the right affecting the femoral, deep femoral, common femoral and external iliac veins.  There is partially occlusive thrombus affecting the right popliteal vein.  There is also occlusive or near occlusive thrombus affecting the left

## 2018-04-26 NOTE — PROGRESS NOTE ADULT - PROBLEM SELECTOR PLAN 10
- therapeutic lovenox due to b/l LE DVT will transition to coumadin, will need CTH in 6 weeks to r/o intracranial hemorrhage as patient has AMS leading to unsteady gait  Diet: Dysphagia diet    Susan Palma PGY-1  Spectre [phone]  Pager # [phone]/ [phone]

## 2018-04-26 NOTE — PROGRESS NOTE ADULT - PROBLEM SELECTOR PLAN 2
-septic on admission; improved on zosyn (4/21-4/24) of antibiotics will now transition to augmentin (4/24-4/26) to complete 7 day tx.

## 2018-04-26 NOTE — SWALLOW VFSS/MBS ASSESSMENT ADULT - ORAL PHASE
Delayed oral transit time/Residue in oral cavity/Reduced anterior - posterior transport/within functional limits Delayed oral transit time/Reduced anterior - posterior transport/Incomplete tongue to palate contact/mild to moderate oral residue/Residue in oral cavity/Uncontrolled bolus / spillover in shawn-pharynx within functional limits/Uncontrolled bolus / spillover in shawn-pharynx/Delayed oral transit time/Reduced anterior - posterior transport/Incomplete tongue to palate contact/Residue in oral cavity

## 2018-04-26 NOTE — PROGRESS NOTE ADULT - PROBLEM SELECTOR PLAN 3
- found on [facility] duplex b/l, appears unprovoked  - c/w therapeutic lovenox, will need AC atleast for 3-6 months.  - started on coumadin first dose 4/25, dose daily, goal INR 2-3

## 2018-04-26 NOTE — DISCHARGE NOTE ADULT - OTHER SIGNIFICANT FINDINGS
< from: [facility] Duplex Lower Ext Vein Scan, Misbah (04.23.18 @ 10:48) >  PROCEDURE DATE:  04/23/2018            INTERPRETATION:  CLINICAL INFORMATION: Bilateral lower extremity swelling   and pain, more severe on the right.    COMPARISON: None available.    TECHNIQUE: Duplex sonography of the BILATERAL LOWER extremities with   color and spectral Doppler, with and without compression.     There is acute, occlusive, echogenic thrombus affecting the right   external iliac, common femoral, femoral and profunda femoral veins.    Partially occlusive thrombus affects a right popliteal vein.    The right calf veins were not diagnostically imaged.    Segmental echogenic thrombus affecting the left deep femoral and common   femoral veins.    To the extent imaged the left popliteal and femoral vein are free of clot.    The left calf veins were not diagnostically imaged.           IMPRESSION: There is extensive occlusive thrombus on the right affecting   the femoral, deep femoral, common femoral and external iliac veins. There   is partially occlusive thrombus affecting the right popliteal vein.    There is also occlusive or near occlusive thrombus affecting the left   deep femoral and common femoral veins.    < end of copied text >

## 2018-04-26 NOTE — DISCHARGE NOTE ADULT - SECONDARY DIAGNOSIS.
Deep vein thrombosis (DVT) of distal vein of both lower extremities, unspecified chronicity Silent aspiration, initial encounter Pneumonia Severe malnutrition

## 2018-04-26 NOTE — DISCHARGE NOTE ADULT - ADDITIONAL INSTRUCTIONS
- please call : [phone] to make an appointment with a primary care doctor at [facility]. They will ask for detailed personal history such as your email, best contact info, insurance. Please see them within 4-6 weeks of hospital discharge.  - Please tell your primary care doctor you need CT chest in 6 weeks to evaluate improvement of your pneumonia  - Please ask your primary care doctor when to stop coumadin for your blood clots in your lower legs - please call : [phone] to make an appointment with a primary care doctor at [facility]. They will ask for detailed personal history such as your email, best contact info, insurance. Please see them within 4-6 weeks of hospital discharge.  - Please tell your primary care doctor you need CT chest in 6 weeks to evaluate improvement of your pneumonia  - Please ask your primary care doctor when to stop coumadin for your blood clots in your lower legs  Recheck comadin in rehab in 2 days.

## 2018-04-26 NOTE — PROGRESS NOTE ADULT - PROBLEM SELECTOR PLAN 8
RESOLVEd  AG 24 on admission, most likely in setting of uremia ( on admission) and postobstructive MATTHEW. Now improving s/p cat placement. No role for HD at this time.  -c/t to monitor BMP   - c/w indwelling cat

## 2018-04-26 NOTE — PROGRESS NOTE ADULT - SUBJECTIVE AND OBJECTIVE BOX
Patient is a 86y old  Male who presents with a chief complaint of fall, decreased po intake (21 Apr 2018 02:16)      SUBJECTIVE / OVERNIGHT EVENTS: No events overnight.     MEDICATIONS  (STANDING):  amoxicillin  875 milliGRAM(s)/clavulanate 1 Tablet(s) Oral two times a day  dextrose 5%. 1000 milliLiter(s) (85 mL/Hr) IV Continuous <Continuous>  enoxaparin Injectable 60 milliGRAM(s) SubCutaneous two times a day  tamsulosin 0.4 milliGRAM(s) Oral at bedtime    MEDICATIONS  (PRN):        CAPILLARY BLOOD GLUCOSE        I&O's Summary    25 Apr 2018 07:01  -  26 Apr 2018 07:00  --------------------------------------------------------  IN: 720 mL / OUT: 600 mL / NET: 120 mL    Vital Signs Last 24 Hrs  T(C): 36.6 (26 Apr 2018 05:09), Max: 36.7 (25 Apr 2018 21:34)  T(F): 97.8 (26 Apr 2018 05:09), Max: 98 (25 Apr 2018 21:34)  HR: 78 (26 Apr 2018 05:09) (78 - 96)  BP: 141/77 (26 Apr 2018 05:09) (141/77 - 166/82)  BP(mean): --  RR: 19 (26 Apr 2018 05:09) (17 - 19)  SpO2: 96% (26 Apr 2018 05:09) (95% - 98%)    PHYSICAL EXAM:  GENERAL: elderly cachectic gentleman, frail, NAD and resting comfortably   HEAD:  Atraumatic, Normocephalic  ENT: EOMI, PERRLA, conjunctiva and sclera clear, Neck supple, No JVD, dry MM   CHEST/LUNG: Pectus excavatum, Clear to auscultation bilaterally; No wheeze, equal breath sounds bilaterally   HEART: RRR, +S1/S2, no m/r/g   ABDOMEN: Soft, Nontender, Nondistended  EXTREMITIES:  +b/l LE edema  NEUROLOGY: AAOx1(oriented to person not place or time), non-focal, cranial nerves intact  : Gross blood noted at urethral meatus w/out blood clots. No gross trauma or skin tear noted.  SKIN: Normal color, No rashes or lesions    LABS:                        11.9   12.2  )-----------( 335      ( 25 Apr 2018 07:11 )             36.8     04-25    154<H>  |  111<H>  |  21  ----------------------------<  109<H>  3.2<L>   |  31  |  0.97    Ca    8.7      25 Apr 2018 07:11  Phos  1.7     04-25  Mg     2.1     04-25 Patient is a 86y old  Male who presents with a chief complaint of fall, decreased po intake (21 Apr 2018 02:16)      SUBJECTIVE / OVERNIGHT EVENTS: No events overnight. Junior in place.    MEDICATIONS  (STANDING):  amoxicillin  875 milliGRAM(s)/clavulanate 1 Tablet(s) Oral two times a day  dextrose 5%. 1000 milliLiter(s) (85 mL/Hr) IV Continuous <Continuous>  enoxaparin Injectable 60 milliGRAM(s) SubCutaneous two times a day  tamsulosin 0.4 milliGRAM(s) Oral at bedtime    MEDICATIONS  (PRN):        CAPILLARY BLOOD GLUCOSE        I&O's Summary    25 Apr 2018 07:01  -  26 Apr 2018 07:00  --------------------------------------------------------  IN: 720 mL / OUT: 600 mL / NET: 120 mL    Vital Signs Last 24 Hrs  T(C): 36.6 (26 Apr 2018 05:09), Max: 36.7 (25 Apr 2018 21:34)  T(F): 97.8 (26 Apr 2018 05:09), Max: 98 (25 Apr 2018 21:34)  HR: 78 (26 Apr 2018 05:09) (78 - 96)  BP: 141/77 (26 Apr 2018 05:09) (141/77 - 166/82)  BP(mean): --  RR: 19 (26 Apr 2018 05:09) (17 - 19)  SpO2: 96% (26 Apr 2018 05:09) (95% - 98%)    PHYSICAL EXAM:  GENERAL: elderly cachectic gentleman, frail, NAD and resting comfortably   HEAD:  Atraumatic, Normocephalic  ENT: EOMI, PERRLA, conjunctiva and sclera clear, Neck supple, No JVD, dry MM   CHEST/LUNG: Pectus excavatum, Clear to auscultation bilaterally; No wheeze, equal breath sounds bilaterally   HEART: RRR, +S1/S2, no m/r/g   ABDOMEN: Soft, Nontender, Nondistended  EXTREMITIES:  +b/l LE edema  NEUROLOGY: AAOx1(oriented to person not place or time), non-focal, cranial nerves intact  : Gross blood noted at urethral meatus w/out blood clots. Junior draining clear urine, No gross trauma or skin tear noted.  SKIN: Normal color, No rashes or lesions    LABS:                        11.9   12.2  )-----------( 335      ( 25 Apr 2018 07:11 )             36.8     04-25    154<H>  |  111<H>  |  21  ----------------------------<  109<H>  3.2<L>   |  31  |  0.97    Ca    8.7      25 Apr 2018 07:11  Phos  1.7     04-25  Mg     2.1     04-25

## 2018-04-26 NOTE — DISCHARGE NOTE ADULT - PLAN OF CARE
Resolved with cat You came in with confusion to the hospital. We think this is because you weren't urinating appropriately due to your prostate being enlarged. We placed a cat catheter that drains urine from your bladder. This helped your kidney function to improve. It is likely you will need cat for a long time until you start urinating on your own. You have blood clots in your vein. Therefore you will need to take coumadin (warfarin) daily for 3-6 months. Your primary care doctor can assess if you need to continue on coumadin for more than 3 - 6 months. Please call [phone] to make an appointment with a [facility] primary care doctor. There is a risk of bleeding with this medication therefore if you notice blood in your stools or urine or vomit blood please obtain medical care immediately. You have aspiration when you eat food. This means that some times when you eat it goes down the pharynx and to trachea putting you at risk for lung infection. We explained risk and benefits and other alternatives to your HCP Charu. She agreed the best course is to let you eat food as you enjoy it. If you develop fevers, chills, cough please seek medical care. You had pneumonia when you came to the hospital. We treated you with antibiotics. We need you to get a CT chest (cat scan of your chest) in 6 weeks to see your pneumonia is improving. Please let your primary care doctor know. Please drink ensure twice daily for severe malnutrition

## 2018-04-26 NOTE — SWALLOW VFSS/MBS ASSESSMENT ADULT - ROSENBEK'S PENETRATION ASPIRATION SCALE
(3) contrast remains above the vocal cords, visible residue remains (penetration) (6) contrast passes glottis, no subglottic residue remains (aspiration)

## 2018-04-27 DIAGNOSIS — T17.900A UNSPECIFIED FOREIGN BODY IN RESPIRATORY TRACT, PART UNSPECIFIED CAUSING ASPHYXIATION, INITIAL ENCOUNTER: ICD-10-CM

## 2018-04-27 LAB
ANION GAP SERPL CALC-SCNC: 8 MMOL/L — SIGNIFICANT CHANGE UP (ref 5–17)
BUN SERPL-MCNC: 19 MG/DL — SIGNIFICANT CHANGE UP (ref 7–23)
CALCIUM SERPL-MCNC: 8.7 MG/DL — SIGNIFICANT CHANGE UP (ref 8.4–10.5)
CHLORIDE SERPL-SCNC: 103 MMOL/L — SIGNIFICANT CHANGE UP (ref 96–108)
CO2 SERPL-SCNC: 32 MMOL/L — HIGH (ref 22–31)
CREAT SERPL-MCNC: 0.92 MG/DL — SIGNIFICANT CHANGE UP (ref 0.5–1.3)
GLUCOSE SERPL-MCNC: 93 MG/DL — SIGNIFICANT CHANGE UP (ref 70–99)
INR BLD: 1.13 RATIO — SIGNIFICANT CHANGE UP (ref 0.88–1.16)
MAGNESIUM SERPL-MCNC: 2 MG/DL — SIGNIFICANT CHANGE UP (ref 1.6–2.6)
PHOSPHATE SERPL-MCNC: 2 MG/DL — LOW (ref 2.5–4.5)
POTASSIUM SERPL-MCNC: 3.8 MMOL/L — SIGNIFICANT CHANGE UP (ref 3.5–5.3)
POTASSIUM SERPL-SCNC: 3.8 MMOL/L — SIGNIFICANT CHANGE UP (ref 3.5–5.3)
PROTHROM AB SERPL-ACNC: 12.3 SEC — SIGNIFICANT CHANGE UP (ref 9.8–12.7)
SODIUM SERPL-SCNC: 143 MMOL/L — SIGNIFICANT CHANGE UP (ref 135–145)

## 2018-04-27 PROCEDURE — 99233 SBSQ HOSP IP/OBS HIGH 50: CPT | Mod: GC

## 2018-04-27 RX ORDER — PHYTONADIONE (VIT K1) 5 MG
2.5 TABLET ORAL DAILY
Qty: 0 | Refills: 0 | Status: DISCONTINUED | OUTPATIENT
Start: 2018-04-27 | End: 2018-04-27

## 2018-04-27 RX ORDER — WARFARIN SODIUM 2.5 MG/1
5 TABLET ORAL ONCE
Qty: 0 | Refills: 0 | Status: COMPLETED | OUTPATIENT
Start: 2018-04-27 | End: 2018-04-27

## 2018-04-27 RX ORDER — SODIUM,POTASSIUM PHOSPHATES 278-250MG
1 POWDER IN PACKET (EA) ORAL
Qty: 0 | Refills: 0 | Status: COMPLETED | OUTPATIENT
Start: 2018-04-27 | End: 2018-04-27

## 2018-04-27 RX ADMIN — ENOXAPARIN SODIUM 60 MILLIGRAM(S): 100 INJECTION SUBCUTANEOUS at 17:57

## 2018-04-27 RX ADMIN — Medication 1 TABLET(S): at 06:17

## 2018-04-27 RX ADMIN — Medication 1 TABLET(S): at 13:30

## 2018-04-27 RX ADMIN — Medication 1 TABLET(S): at 09:26

## 2018-04-27 RX ADMIN — Medication 1 TABLET(S): at 17:57

## 2018-04-27 RX ADMIN — ENOXAPARIN SODIUM 60 MILLIGRAM(S): 100 INJECTION SUBCUTANEOUS at 06:17

## 2018-04-27 RX ADMIN — WARFARIN SODIUM 5 MILLIGRAM(S): 2.5 TABLET ORAL at 21:17

## 2018-04-27 RX ADMIN — Medication 1 TABLET(S): at 21:17

## 2018-04-27 RX ADMIN — TAMSULOSIN HYDROCHLORIDE 0.4 MILLIGRAM(S): 0.4 CAPSULE ORAL at 21:17

## 2018-04-27 NOTE — PROGRESS NOTE ADULT - ATTENDING COMMENTS
I was physically present for the key portions of the evaluation and management (E/M) service provided.  I agree with the above history, physical, and plan which I have reviewed and edited where appropriate. Pt slowly returning back to mental baseline as per HCP, but not yet 100% back to normal. Being treated for bilateral DVT with coumadin, with urinary retention s/p multiple failed TOV now with cat and resolving post-obstructive MATTHEW. Pt with RLL lung nodule infection vs. neoplasm which will need to be followed up with repeat CT head in 6 weeks as OP as HCP doesn't want to pursue biopsy at this time. Awaiting rehab placement- will work with HCP to select rehab.

## 2018-04-27 NOTE — PROGRESS NOTE ADULT - PROBLEM SELECTOR PLAN 10
- therapeutic lovenox due to b/l LE DVT bridge to coumadin, will need CT chest in 6 weeks to evaluate interval improvement of PNA.  Diet: Soft  Dispo: pending JOCELYNE makayla Palma PGY-1  Spectre [phone]  Pager # [phone]/ [phone]

## 2018-04-27 NOTE — PROGRESS NOTE ADULT - PROBLEM SELECTOR PLAN 8
- Dietician evaluated patient  -s/w c/s: patient's HCP states it is difficult for her to care for him  - will cont w/ soft diet.

## 2018-04-27 NOTE — PROGRESS NOTE ADULT - SUBJECTIVE AND OBJECTIVE BOX
Patient is a 86y old  Male who presents with a chief complaint of fall, decreased po intake (26 Apr 2018 12:50)      SUBJECTIVE / OVERNIGHT EVENTS: No events overnight. Pending JOCELYNE placement will go with stephania.    MEDICATIONS  (STANDING):  amoxicillin  875 milliGRAM(s)/clavulanate 1 Tablet(s) Oral two times a day  dextrose 5%. 1000 milliLiter(s) (60 mL/Hr) IV Continuous <Continuous>  enoxaparin Injectable 60 milliGRAM(s) SubCutaneous two times a day  potassium acid phosphate/sodium acid phosphate tablet (K-PHOS No. 2) 1 Tablet(s) Oral three times a day after meals  tamsulosin 0.4 milliGRAM(s) Oral at bedtime    MEDICATIONS  (PRN):        CAPILLARY BLOOD GLUCOSE        I&O's Summary    26 Apr 2018 07:01  -  27 Apr 2018 07:00  --------------------------------------------------------  IN: 240 mL / OUT: 950 mL / NET: -710 mL    Vital Signs Last 24 Hrs  T(C): 36.3 (27 Apr 2018 04:59), Max: 36.7 (26 Apr 2018 13:30)  T(F): 97.3 (27 Apr 2018 04:59), Max: 98.1 (26 Apr 2018 21:26)  HR: 86 (27 Apr 2018 04:59) (80 - 88)  BP: 134/77 (27 Apr 2018 04:59) (124/78 - 167/84)  BP(mean): --  RR: 18 (27 Apr 2018 04:59) (18 - 19)  SpO2: 98% (27 Apr 2018 04:59) (96% - 98%)    PHYSICAL EXAM:  GENERAL: elderly cachectic gentleman, frail, NAD and resting comfortably   HEAD:  Atraumatic, Normocephalic  ENT: EOMI, PERRLA, conjunctiva and sclera clear, Neck supple, No JVD, dry MM   CHEST/LUNG: Pectus excavatum, Clear to auscultation bilaterally; No wheeze, equal breath sounds bilaterally   HEART: RRR, +S1/S2, no m/r/g   ABDOMEN: Soft, Nontender, Nondistended  EXTREMITIES:  +b/l LE edema  NEUROLOGY: AAOx1(oriented to person not place or time), non-focal, cranial nerves intact  : Gross blood noted at urethral meatus w/out blood clots. Junior draining clear urine, No gross trauma or skin tear noted.  SKIN: Normal color, No rashes or lesions    LABS:                        12.1   12.6  )-----------( 343      ( 26 Apr 2018 07:02 )             37.3     04-26    147<H>  |  107  |  18  ----------------------------<  127<H>  4.0   |  30  |  0.98    Ca    9.0      26 Apr 2018 07:02  Phos  1.8     04-26  Mg     2.0     04-26      PT/INR - ( 27 Apr 2018 06:41 )   PT: 12.3 sec;   INR: 1.13 ratio         PTT - ( 26 Apr 2018 07:02 )  PTT:34.1 sec

## 2018-04-27 NOTE — PROGRESS NOTE ADULT - PROBLEM SELECTOR PLAN 2
- seen on MBS  - after an extensive GOC discussion with health care proxy, will continue with pleasure feeds despite at risk for aspiration. HCP aware of risks and benefits.  - c/w soft diet

## 2018-04-27 NOTE — PROGRESS NOTE ADULT - ASSESSMENT
86 year old cachectic Male w/ no known pmhx presenting s/p fall, found to be hyperkalemic, MATTHEW 2/ 2 obstructive uropathy +/- prerenal component, acute encephalopathy most likely 2/2 to uremia vs progressive dementia, anion gap metabolic acidosis 2/2 uremia and sepsis likely 2/2 CAP. Remains HD stable and clinically improved pending JOCELYNE placement.

## 2018-04-27 NOTE — PROGRESS NOTE ADULT - PROBLEM SELECTOR PLAN 1
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

## 2018-04-28 LAB
ANION GAP SERPL CALC-SCNC: 9 MMOL/L — SIGNIFICANT CHANGE UP (ref 5–17)
BUN SERPL-MCNC: 23 MG/DL — SIGNIFICANT CHANGE UP (ref 7–23)
CALCIUM SERPL-MCNC: 8.4 MG/DL — SIGNIFICANT CHANGE UP (ref 8.4–10.5)
CHLORIDE SERPL-SCNC: 105 MMOL/L — SIGNIFICANT CHANGE UP (ref 96–108)
CO2 SERPL-SCNC: 29 MMOL/L — SIGNIFICANT CHANGE UP (ref 22–31)
CREAT SERPL-MCNC: 0.9 MG/DL — SIGNIFICANT CHANGE UP (ref 0.5–1.3)
GLUCOSE SERPL-MCNC: 88 MG/DL — SIGNIFICANT CHANGE UP (ref 70–99)
HCT VFR BLD CALC: 33.8 % — LOW (ref 39–50)
HGB BLD-MCNC: 11.1 G/DL — LOW (ref 13–17)
INR BLD: 1.65 RATIO — HIGH (ref 0.88–1.16)
MCHC RBC-ENTMCNC: 30.6 PG — SIGNIFICANT CHANGE UP (ref 27–34)
MCHC RBC-ENTMCNC: 32.9 GM/DL — SIGNIFICANT CHANGE UP (ref 32–36)
MCV RBC AUTO: 93.1 FL — SIGNIFICANT CHANGE UP (ref 80–100)
PLATELET # BLD AUTO: 348 K/UL — SIGNIFICANT CHANGE UP (ref 150–400)
POTASSIUM SERPL-MCNC: 3.8 MMOL/L — SIGNIFICANT CHANGE UP (ref 3.5–5.3)
POTASSIUM SERPL-SCNC: 3.8 MMOL/L — SIGNIFICANT CHANGE UP (ref 3.5–5.3)
PROTHROM AB SERPL-ACNC: 18 SEC — HIGH (ref 9.8–12.7)
RBC # BLD: 3.63 M/UL — LOW (ref 4.2–5.8)
RBC # FLD: 12 % — SIGNIFICANT CHANGE UP (ref 10.3–14.5)
SODIUM SERPL-SCNC: 143 MMOL/L — SIGNIFICANT CHANGE UP (ref 135–145)
WBC # BLD: 10 K/UL — SIGNIFICANT CHANGE UP (ref 3.8–10.5)
WBC # FLD AUTO: 10 K/UL — SIGNIFICANT CHANGE UP (ref 3.8–10.5)

## 2018-04-28 PROCEDURE — 99233 SBSQ HOSP IP/OBS HIGH 50: CPT | Mod: GC

## 2018-04-28 RX ORDER — WARFARIN SODIUM 2.5 MG/1
5 TABLET ORAL ONCE
Qty: 0 | Refills: 0 | Status: COMPLETED | OUTPATIENT
Start: 2018-04-28 | End: 2018-04-28

## 2018-04-28 RX ADMIN — ENOXAPARIN SODIUM 60 MILLIGRAM(S): 100 INJECTION SUBCUTANEOUS at 05:21

## 2018-04-28 RX ADMIN — TAMSULOSIN HYDROCHLORIDE 0.4 MILLIGRAM(S): 0.4 CAPSULE ORAL at 21:18

## 2018-04-28 RX ADMIN — WARFARIN SODIUM 5 MILLIGRAM(S): 2.5 TABLET ORAL at 21:18

## 2018-04-28 RX ADMIN — ENOXAPARIN SODIUM 60 MILLIGRAM(S): 100 INJECTION SUBCUTANEOUS at 17:00

## 2018-04-28 NOTE — PROGRESS NOTE ADULT - PROBLEM SELECTOR PLAN 7
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 nutritional deficiencies. CT head w/out e/o hemorrhage or mass, although concerning for NPH. B12, folate,  TSH, RPR, and HIV normal .  -continue to montior mental status  - should pt decline, will consider neurology evaluation - PT recommended JOCELYNE  - pending placement

## 2018-04-28 NOTE — PROGRESS NOTE ADULT - SUBJECTIVE AND OBJECTIVE BOX
Patient is a 86y old  Male who presents with a chief complaint of fall, decreased po intake (26 Apr 2018 12:50)      SUBJECTIVE / OVERNIGHT EVENTS: No events overnight. Junior in place draining grossly bloody urine.     MEDICATIONS  (STANDING):  enoxaparin Injectable 60 milliGRAM(s) SubCutaneous two times a day  tamsulosin 0.4 milliGRAM(s) Oral at bedtime  warfarin 5 milliGRAM(s) Oral once    MEDICATIONS  (PRN):        CAPILLARY BLOOD GLUCOSE        I&O's Summary    27 Apr 2018 07:01  -  28 Apr 2018 07:00  --------------------------------------------------------  IN: 1460 mL / OUT: 750 mL / NET: 710 mL    Vital Signs Last 24 Hrs  T(C): 36.4 (28 Apr 2018 04:16), Max: 36.7 (27 Apr 2018 21:37)  T(F): 97.6 (28 Apr 2018 04:16), Max: 98 (27 Apr 2018 21:37)  HR: 79 (28 Apr 2018 04:16) (79 - 89)  BP: 149/70 (28 Apr 2018 04:16) (130/77 - 149/70)  BP(mean): --  RR: 18 (28 Apr 2018 04:16) (18 - 18)  SpO2: 98% (28 Apr 2018 04:16) (95% - 98%)    PHYSICAL EXAM:  GENERAL: elderly cachectic gentleman, frail, NAD and resting comfortably   HEAD:  Atraumatic, Normocephalic  ENT: EOMI, PERRLA, conjunctiva and sclera clear, Neck supple, No JVD, dry MM   CHEST/LUNG: Pectus excavatum, Clear to auscultation bilaterally; No wheeze, equal breath sounds bilaterally   HEART: RRR, +S1/S2, no m/r/g   ABDOMEN: Soft, Nontender, Nondistended  EXTREMITIES:  +b/l LE edema  NEUROLOGY: AAOx1(oriented to person not place or time), non-focal, cranial nerves intact  : Gross blood noted at urethral meatus w/out blood clots. Junior w/ gross hematuria, No gross trauma or skin tear noted.  SKIN: Normal color, No rashes or lesions    LABS:                        11.1   10.0  )-----------( 348      ( 28 Apr 2018 06:45 )             33.8     04-28    143  |  105  |  23  ----------------------------<  88  3.8   |  29  |  0.90    Ca    8.4      28 Apr 2018 06:45  Phos  2.0     04-27  Mg     2.0     04-27      PT/INR - ( 28 Apr 2018 06:45 )   PT: 18.0 sec;   INR: 1.65 ratio Patient is a 86y old  Male who presents with a chief complaint of fall, decreased po intake (26 Apr 2018 12:50)      SUBJECTIVE / OVERNIGHT EVENTS: No events overnight.   denies complaints. denies sob, abd pain, n/v. eating well.    CAPILLARY BLOOD GLUCOSE    I&O's Summary    27 Apr 2018 07:01  -  28 Apr 2018 07:00  --------------------------------------------------------  IN: 1460 mL / OUT: 750 mL / NET: 710 mL    Vital Signs Last 24 Hrs  T(C): 36.4 (28 Apr 2018 04:16), Max: 36.7 (27 Apr 2018 21:37)  T(F): 97.6 (28 Apr 2018 04:16), Max: 98 (27 Apr 2018 21:37)  HR: 79 (28 Apr 2018 04:16) (79 - 89)  BP: 149/70 (28 Apr 2018 04:16) (130/77 - 149/70)  BP(mean): --  RR: 18 (28 Apr 2018 04:16) (18 - 18)  SpO2: 98% (28 Apr 2018 04:16) (95% - 98%)    PHYSICAL EXAM:  GENERAL: elderly cachectic gentleman, frail, NAD and resting comfortably   HEAD:  Atraumatic, Normocephalic  ENT: EOMI, PERRLA, conjunctiva and sclera clear, Neck supple, No JVD, dry MM   CHEST/LUNG: Pectus excavatum, Clear to auscultation bilaterally; No wheeze, equal breath sounds bilaterally   HEART: RRR, +S1/S2, no m/r/g   ABDOMEN: Soft, Nontender, Nondistended  EXTREMITIES:  +b/l LE edema, LUE wrapped in guaze   NEUROLOGY: chronic, AAOx1(oriented to person not place or time), non-focal, cranial nerves intact  : Gross blood noted at urethral meatus w/out blood clots. Junior w/ light pink urine, No gross trauma or skin tear noted.  SKIN: Normal color, No rashes or lesions    LABS:                        11.1   10.0  )-----------( 348      ( 28 Apr 2018 06:45 )             33.8     04-28    143  |  105  |  23  ----------------------------<  88  3.8   |  29  |  0.90    Ca    8.4      28 Apr 2018 06:45  Phos  2.0     04-27  Mg     2.0     04-27    PT/INR - ( 28 Apr 2018 06:45 )   PT: 18.0 sec;   INR: 1.65 ratio      MEDICATIONS  (STANDING):  enoxaparin Injectable 60 milliGRAM(s) SubCutaneous two times a day  tamsulosin 0.4 milliGRAM(s) Oral at bedtime    MEDICATIONS  (PRN):

## 2018-04-28 NOTE — PROGRESS NOTE ADULT - PROBLEM SELECTOR PLAN 10
- therapeutic lovenox due to b/l LE DVT bridge to coumadin, will need CT chest in 6 weeks to evaluate interval improvement of PNA.  Diet: Soft  Dispo: pending JOCELYNE makayla Palma PGY-1  Spectre [id number]  Pager # [phone]/ [phone]

## 2018-04-28 NOTE — PROGRESS NOTE ADULT - PROBLEM SELECTOR PLAN 1
- [facility], appears unprovoked  - c/w therapeutic lovenox, will need AC atleast for 3-6 months.  - started on coumadin first dose 4/25, dose daily, goal INR 2-3 found [facility] b/l, appears unprovoked  - c/w therapeutic lovenox, will need AC atleast for 3-6 months.  - started on coumadin first dose 4/25, dose daily, goal INR 2-3

## 2018-04-28 NOTE — PROGRESS NOTE ADULT - PROBLEM SELECTOR PLAN 6
Resolved. K+8.4 on admission, EKG w/ no evidence of peaked T waves  -hyperkalemia most likely 2/2 MATTHEW which is 2/2 obstructive uropathy (seen on CT A/P) now s/p cat placement.   - S/p bicarb gtt. Per nephrology, as hyperK downtrended, no indication for urgent HD. Remains to have adequate UOP.  - continue w/ telemetry monitoring  - continue to monitor K w/ daily BMPs - Dietician evaluated patient  - will cont w/ soft diet.

## 2018-04-28 NOTE — PROGRESS NOTE ADULT - ASSESSMENT
86 year old cachectic Male w/ no known pmhx presenting s/p fall, found to be hyperkalemic, MATTHEW 2/ 2 obstructive uropathy +/- prerenal component, acute encephalopathy most likely 2/2 to uremia vs progressive dementia, anion gap metabolic acidosis 2/2 uremia and sepsis likely 2/2 CAP. Remains HD stable and clinically improved pending JOCELYNE placement. 87 y/o cachectic M no known pmhx presenting s/p fall, found to be hyperkalemic, MATTHEW 2/ 2 obstructive uropathy +/- prerenal component, acute encephalopathy most likely 2/2 to uremia vs progressive dementia, anion gap metabolic acidosis 2/2 uremia and sepsis likely 2/2 CAP/aspiration pna, b/l DVTs, hypernatremia, MATTHEW. Remains HD stable and clinically improved pending JOCELYNE placement.

## 2018-04-28 NOTE — PROGRESS NOTE ADULT - PROBLEM SELECTOR PLAN 2
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 HCP aware of risks and benefits.  - c/w soft diet

## 2018-04-28 NOTE — PROGRESS NOTE ADULT - PROBLEM SELECTOR PLAN 3
- seen on MBS  - after an extensive GOC discussion with health care proxy, will continue with pleasure feeds despite at risk for aspiration. HCP aware of risks and benefits.  - c/w soft diet septic on admission now sepsis resolved  - improved s/p zosyn (4/21-4/24), augmentin (4/24-4/26) complete 7 day tx  - monitor

## 2018-04-28 NOTE — PROGRESS NOTE ADULT - PROBLEM SELECTOR PLAN 5
RESOLVED  - Cr. 7.08 is now 1.08, unknown baseline, patient admitted w/ MATTHEW likely postobstructive azotemia 2/2 bilateral hydronephrosis w/ large distended bladder seen on CT imaging  -now resolving s/p cat placement w/ 1.4 liters output  -Cr. improving w/ serial BMPs, will continue to monitor urine output, currently no indication for HD  -closely monitor lytes in setting of postobstructive diuresis   - started flomax. failed TOV x 1,   - c/w tele monitoring   - appreciate nephrology recs. 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 nutritional deficiencies. CT head w/out e/o hemorrhage or mass, although concerning for NPH. B12, folate,  TSH, RPR, and HIV normal .  - continue to montior mental status

## 2018-04-28 NOTE — PROGRESS NOTE ADULT - PROBLEM SELECTOR PLAN 8
- Dietician evaluated patient  -s/w c/s: patient's HCP states it is difficult for her to care for him  - will cont w/ soft diet. - therapeutic lovenox due to b/l LE DVT bridge to coumadin, will need CT chest in 6 weeks to evaluate interval improvement of PNA.  Diet: Soft  Dispo: pending JOCELYNE makayla Palma PGY-1  Spectre [phone]  Pager # [phone]/ [phone]

## 2018-04-28 NOTE — PROGRESS NOTE ADULT - PROBLEM SELECTOR PLAN 4
-septic on admission; improved on zosyn (4/21-4/24) of antibiotics will now transition to augmentin (4/24-4/26) to complete 7 day tx. RESOLVED  - Cr. 7.08 is now 1.08, unknown baseline, patient admitted w/ MATTHEW likely postobstructive azotemia 2/2 bilateral hydronephrosis w/ large distended bladder seen on CT imaging  - now resolving s/p cat placement   - Cr. improving w/ serial BMPs, will continue to monitor urine output, currently no indication for HD  - started flomax. failed TOV x 1,   - appreciate nephrology recs  - monitor hematuria as pt on lovenox

## 2018-04-29 DIAGNOSIS — Z71.89 OTHER SPECIFIED COUNSELING: ICD-10-CM

## 2018-04-29 LAB
INR BLD: 2.34 RATIO — HIGH (ref 0.88–1.16)
PROTHROM AB SERPL-ACNC: 26 SEC — HIGH (ref 9.8–12.7)

## 2018-04-29 PROCEDURE — 99233 SBSQ HOSP IP/OBS HIGH 50: CPT | Mod: GC

## 2018-04-29 RX ORDER — WARFARIN SODIUM 2.5 MG/1
3 TABLET ORAL ONCE
Qty: 0 | Refills: 0 | Status: COMPLETED | OUTPATIENT
Start: 2018-04-29 | End: 2018-04-29

## 2018-04-29 RX ORDER — ENOXAPARIN SODIUM 100 MG/ML
60 INJECTION SUBCUTANEOUS
Qty: 0 | Refills: 0 | Status: DISCONTINUED | OUTPATIENT
Start: 2018-04-29 | End: 2018-04-30

## 2018-04-29 RX ADMIN — TAMSULOSIN HYDROCHLORIDE 0.4 MILLIGRAM(S): 0.4 CAPSULE ORAL at 21:41

## 2018-04-29 RX ADMIN — ENOXAPARIN SODIUM 60 MILLIGRAM(S): 100 INJECTION SUBCUTANEOUS at 17:44

## 2018-04-29 RX ADMIN — ENOXAPARIN SODIUM 60 MILLIGRAM(S): 100 INJECTION SUBCUTANEOUS at 05:10

## 2018-04-29 RX ADMIN — WARFARIN SODIUM 3 MILLIGRAM(S): 2.5 TABLET ORAL at 21:41

## 2018-04-29 NOTE — PROGRESS NOTE ADULT - ASSESSMENT
85 y/o cachectic M no known pmhx presenting s/p fall, found to be hyperkalemic, MATTHEW 2/ 2 obstructive uropathy +/- prerenal component, acute encephalopathy most likely 2/2 to uremia vs progressive dementia, anion gap metabolic acidosis 2/2 uremia and sepsis likely 2/2 CAP/aspiration pna, b/l DVTs, hypernatremia, MATTHEW. Remains HD stable and clinically improved pending JOCELYNE placement.

## 2018-04-29 NOTE — PROGRESS NOTE ADULT - PROBLEM SELECTOR PLAN 4
RESOLVED  - Cr. 7.08 is now 1.08, unknown baseline, patient admitted w/ MATTHEW likely postobstructive azotemia 2/2 bilateral hydronephrosis w/ large distended bladder seen on CT imaging  - now resolving s/p cat placement   - Cr. improving w/ serial BMPs, will continue to monitor urine output, currently no indication for HD  - started flomax. failed TOV x 1,   - appreciate nephrology recs  - monitor hematuria as pt on lovenox RESOLVED  - Cr. 7.08 is now 1.08, unknown baseline, patient admitted w/ MATTHEW likely postobstructive azotemia 2/2 bilateral hydronephrosis w/ large distended bladder seen on CT imaging  - now resolving s/p cat placement   - Cr. improving w/ serial BMPs, will continue to monitor urine output, currently no indication for HD  - started flomax. failed TOV x 1,   - appreciate nephrology recs  - monitor hematuria (now resolved)

## 2018-04-29 NOTE — PROGRESS NOTE ADULT - SUBJECTIVE AND OBJECTIVE BOX
Patient is a 86y old  Male who presents with a chief complaint of fall, decreased po intake (26 Apr 2018 12:50)      SUBJECTIVE / OVERNIGHT EVENTS: No events overnight. Awaiting placement.    MEDICATIONS  (STANDING):  enoxaparin Injectable 60 milliGRAM(s) SubCutaneous two times a day  tamsulosin 0.4 milliGRAM(s) Oral at bedtime    MEDICATIONS  (PRN):        CAPILLARY BLOOD GLUCOSE        I&O's Summary    28 Apr 2018 07:01  -  29 Apr 2018 07:00  --------------------------------------------------------  IN: 400 mL / OUT: 1050 mL / NET: -650 mL    Vital Signs Last 24 Hrs  T(C): 36.6 (29 Apr 2018 04:38), Max: 36.7 (28 Apr 2018 19:57)  T(F): 97.8 (29 Apr 2018 04:38), Max: 98.1 (28 Apr 2018 19:57)  HR: 75 (29 Apr 2018 04:38) (75 - 83)  BP: 129/78 (29 Apr 2018 04:38) (119/61 - 134/76)  BP(mean): --  RR: 18 (29 Apr 2018 04:38) (18 - 18)  SpO2: 96% (29 Apr 2018 04:38) (95% - 98%)      PHYSICAL EXAM:  GENERAL: elderly cachectic gentleman, frail, NAD and resting comfortably   HEAD:  Atraumatic, Normocephalic  ENT: EOMI, PERRLA, conjunctiva and sclera clear, Neck supple, No JVD, dry MM   CHEST/LUNG: Pectus excavatum, Clear to auscultation bilaterally; No wheeze, equal breath sounds bilaterally   HEART: RRR, +S1/S2, no m/r/g   ABDOMEN: Soft, Nontender, Nondistended  EXTREMITIES:  +b/l LE edema, LUE wrapped in guaze   NEUROLOGY: chronic, AAOx1(oriented to person not place or time), non-focal, cranial nerves intact  : Gross blood noted at urethral meatus w/out blood clots. Junior w/ light pink urine, No gross trauma or skin tear noted.  SKIN: Normal color, No rashes or lesions      LABS:                        11.1   10.0  )-----------( 348      ( 28 Apr 2018 06:45 )             33.8     04-28    143  |  105  |  23  ----------------------------<  88  3.8   |  29  |  0.90    Ca    8.4      28 Apr 2018 06:45      PT/INR - ( 29 Apr 2018 06:25 )   PT: 26.0 sec;   INR: 2.34 ratio Patient is a 86y old  Male who presents with a chief complaint of fall, decreased po intake (26 Apr 2018 12:50)      SUBJECTIVE / OVERNIGHT EVENTS: No events overnight. Awaiting placement.    MEDICATIONS  (STANDING):  enoxaparin Injectable 60 milliGRAM(s) SubCutaneous two times a day  tamsulosin 0.4 milliGRAM(s) Oral at bedtime      I&O's Summary    28 Apr 2018 07:01  -  29 Apr 2018 07:00  --------------------------------------------------------  IN: 400 mL / OUT: 1050 mL / NET: -650 mL    Vital Signs Last 24 Hrs  T(C): 36.6 (29 Apr 2018 04:38), Max: 36.7 (28 Apr 2018 19:57)  T(F): 97.8 (29 Apr 2018 04:38), Max: 98.1 (28 Apr 2018 19:57)  HR: 75 (29 Apr 2018 04:38) (75 - 83)  BP: 129/78 (29 Apr 2018 04:38) (119/61 - 134/76)  BP(mean): --  RR: 18 (29 Apr 2018 04:38) (18 - 18)  SpO2: 96% (29 Apr 2018 04:38) (95% - 98%)      PHYSICAL EXAM:  GENERAL: elderly cachectic gentleman, frail, NAD and resting comfortably   HEAD:  Atraumatic, Normocephalic  ENT: EOMI, PERRLA, conjunctiva and sclera clear, Neck supple, No JVD, dry MM   CHEST/LUNG: Pectus excavatum, Clear to auscultation bilaterally; No wheeze, equal breath sounds bilaterally   HEART: RRR, +S1/S2, no m/r/g   ABDOMEN: Soft, Nontender, Nondistended  EXTREMITIES:  +b/l LE edema, LUE wrapped in guaze   NEUROLOGY: chronic, AAOx1(oriented to person not place or time), non-focal, cranial nerves intact  : Gross blood noted at urethral meatus w/out blood clots. Junior w/ light pink urine, No gross trauma or skin tear noted.  SKIN: Normal color, No rashes or lesions      LABS:                        11.1   10.0  )-----------( 348      ( 28 Apr 2018 06:45 )             33.8     04-28    143  |  105  |  23  ----------------------------<  88  3.8   |  29  |  0.90    Ca    8.4      28 Apr 2018 06:45      PT/INR - ( 29 Apr 2018 06:25 )   PT: 26.0 sec;   INR: 2.34 ratio Patient is a 86y old  Male who presents with a chief complaint of fall, decreased po intake (26 Apr 2018 12:50)      SUBJECTIVE / OVERNIGHT EVENTS: No events overnight. denies complaints. Awaiting placement.    I&O's Summary    28 Apr 2018 07:01  -  29 Apr 2018 07:00  --------------------------------------------------------  IN: 400 mL / OUT: 1050 mL / NET: -650 mL    Vital Signs Last 24 Hrs  T(C): 36.6 (29 Apr 2018 04:38), Max: 36.7 (28 Apr 2018 19:57)  T(F): 97.8 (29 Apr 2018 04:38), Max: 98.1 (28 Apr 2018 19:57)  HR: 75 (29 Apr 2018 04:38) (75 - 83)  BP: 129/78 (29 Apr 2018 04:38) (119/61 - 134/76)  BP(mean): --  RR: 18 (29 Apr 2018 04:38) (18 - 18)  SpO2: 96% (29 Apr 2018 04:38) (95% - 98%)    PHYSICAL EXAM:  GENERAL: elderly cachectic gentleman, frail, NAD and resting comfortably   HEAD:  Atraumatic, Normocephalic  ENT: EOMI, PERRLA, conjunctiva and sclera clear, Neck supple, No JVD, dry MM   CHEST/LUNG: Pectus excavatum, Clear to auscultation bilaterally; No wheeze, equal breath sounds bilaterally   HEART: RRR, +S1/S2, no m/r/g   ABDOMEN: Soft, Nontender, Nondistended  EXTREMITIES:  +b/l LE edema, LUE wrapped in guaze   NEUROLOGY: chronic, AAOx1(oriented to person not place or time), non-focal, cranial nerves intact  : cat draining clear yellow urine    LABS:                        11.1   10.0  )-----------( 348      ( 28 Apr 2018 06:45 )             33.8     04-28    143  |  105  |  23  ----------------------------<  88  3.8   |  29  |  0.90    Ca    8.4      28 Apr 2018 06:45      PT/INR - ( 29 Apr 2018 06:25 )   PT: 26.0 sec;   INR: 2.34 ratio      MEDICATIONS  (STANDING):  enoxaparin Injectable 60 milliGRAM(s) SubCutaneous two times a day  tamsulosin 0.4 milliGRAM(s) Oral at bedtime  warfarin 3 milliGRAM(s) Oral once    MEDICATIONS  (PRN):

## 2018-04-29 NOTE — PROGRESS NOTE ADULT - PROBLEM SELECTOR PLAN 9
DVT ppx: therapeutic on coumadin,   Diet: Soft  Dispo: pending [facility] placement, will need CT chest in 6 weeks to evaluate interval improvement of PNA.    Susan Palma PGY-1  Spectre [phone]  Pager # [phone]/ [phone] DVT ppx: therapeutic on coumadin  Diet: Soft  Dispo: pending JOC[facility]NE placement, will need CT chest in 6 weeks to evaluate interval improvement of PNA.    Susan Palma PGY-1  Spectre [phone]  Pager # [phone]/ [phone]

## 2018-04-29 NOTE — PROGRESS NOTE ADULT - PROBLEM SELECTOR PLAN 5
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 nutritional deficiencies. CT head w/out e/o hemorrhage or mass, although concerning for NPH. B12, folate,  TSH, RPR, and HIV normal .  - continue to montior mental status

## 2018-04-29 NOTE — PROGRESS NOTE ADULT - PROBLEM SELECTOR PLAN 3
septic on admission now sepsis resolved  - improved s/p zosyn (4/21-4/24), augmentin (4/24-4/26) complete 7 day tx  - monitor

## 2018-04-29 NOTE — PROGRESS NOTE ADULT - PROBLEM SELECTOR PLAN 2
seen on MBS  - after an extensive GOC discussion with health care proxy, will continue with pleasure feeds despite at risk for aspiration. HCP aware of risks and benefits.  - c/w soft diet

## 2018-04-29 NOTE — PROGRESS NOTE ADULT - PROBLEM SELECTOR PLAN 8
- therapeutic lovenox due to b/l LE DVT bridge to coumadin, will need CT chest in 6 weeks to evaluate interval improvement of PNA.  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  - undergoing medicaid eval, will attempt to place patient in long term facility care.

## 2018-04-29 NOTE — PROGRESS NOTE ADULT - PROBLEM SELECTOR PLAN 1
found [facility] b[facility]l, appears unprovoked  - c/w therapeutic lovenox, will need AC atleast for 3-6 months.  - started on coumadin first dose 4/25, dose daily, goal INR 2-3 found [facility] b/l, appears unprovoked  - will need AC atleast for 3-6 months.  - started on coumadin first dose 4/25, dose daily, goal INR 2-3  - must bridge coumadin with lovenox and continue lovenox UNTIL INR>2 for 24 HOURS. Thus repeat INR tomorrow AM, if therapeutic, can then d/c lovenox.

## 2018-04-30 LAB
INR BLD: 3.01 RATIO — HIGH (ref 0.88–1.16)
PROTHROM AB SERPL-ACNC: 33.2 SEC — HIGH (ref 9.8–12.7)

## 2018-04-30 PROCEDURE — 99233 SBSQ HOSP IP/OBS HIGH 50: CPT | Mod: GC

## 2018-04-30 RX ORDER — WARFARIN SODIUM 2.5 MG/1
2 TABLET ORAL ONCE
Qty: 0 | Refills: 0 | Status: COMPLETED | OUTPATIENT
Start: 2018-04-30 | End: 2018-04-30

## 2018-04-30 RX ADMIN — TAMSULOSIN HYDROCHLORIDE 0.4 MILLIGRAM(S): 0.4 CAPSULE ORAL at 21:04

## 2018-04-30 RX ADMIN — ENOXAPARIN SODIUM 60 MILLIGRAM(S): 100 INJECTION SUBCUTANEOUS at 05:36

## 2018-04-30 RX ADMIN — WARFARIN SODIUM 2 MILLIGRAM(S): 2.5 TABLET ORAL at 21:04

## 2018-04-30 NOTE — CHART NOTE - NSCHARTNOTEFT_GEN_A_CORE
Source: Patient [x ]    Family [ ]     other [x ]PCA, chart since previous assess on 4/25, which I completed.   seen for malnutrition follow up  admitted for chief complaint of fall, decreased po intake seen for follow up  nutrition related problems:  Silent aspiration,   Acute kidney injury  Uremic encephalopathy.  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 nutritional deficiencies.       Diet :  DYS3HC- 2 x magic cups daily.  Silent aspiration, initial encounter.  [place] [facility]  - after an extensive GOC discussion with health care proxy, will continue with pleasure feeds despite at risk for aspiration. HCP aware of risks and benefits.  - c/w soft diet.       Patient reports [ ] nausea  [ ] vomiting [ ] diarrhea [ ] constipation  [ ]chewing problems [ ] swallowing issues  [x ] other: not getting enough food, discussed snacks and supplements. obtained preferences;pudding rather than shakes, peanut butter, fruit, sweets.      PO intake:  < 50% [x ] 50-75% [ ]   % [ ]  other : eating      Source for PO intake [x ] Patient [ ] family [ ] chart [ ] staff [ ] other           Current Weight: 58.4kg  % Weight Change: 2% gain since previous assess on 4/25    Pertinent Medications: MEDICATIONS  (STANDING):  enoxaparin Injectable 60 milliGRAM(s) SubCutaneous two times a day  tamsulosin 0.4 milliGRAM(s) Oral at bedtime    MEDICATIONS  (PRN):    Pertinent Labs:  04-28 Na143 mmol/L Glu 88 mg/dL K+ 3.8 mmol/L Cr  0.90 mg/dL BUN 23 mg/dL 04-27 Phos 2.0 mg/dL<L        Skin: stage 1 sacrum, +2 right left, ankle foot    Estimated Needs:   [x ] no change since previous assessment  [ ] recalculated:       Previous Nutrition Diagnosis:     [ ] Inadequate Energy Intake [ ]Inadequate Oral Intake [ ] Excessive Energy Intake     [ ] Underweight [ ] Increased Nutrient Needs [ ] Overweight/Obesity     [ ] Altered GI Function [ ] Unintended Weight Loss [ ] Food & Nutrition Related Knowledge Deficit [ x] Malnutrition-severe         Nutrition Diagnosis is [x ] ongoing  [ ] resolved [ ] not applicable -being addressed with snacks and supplements         New Nutrition Diagnosis: [x ] not applicable    [ ] Inadequate Protein Energy Intake [ ]Inadequate Oral Intake [ ] Excessive Energy Intake     [ ] Underweight [ ] Increased Nutrient Needs [ ] Overweight/Obesity     [ ] Altered GI Function [ ] Unintended Weight Loss [ ] Food & Nutrition Related Knowledge Deficit[ ] Limited Adherence to nutrition related recommendations [ ] Malnutrition  [ ] other: Free text       Related to:      As evidenced by:      Interventions:     Recommend    [ ] Change Diet To:    [x ] Nutrition Supplement-ensure pudding x 3 daily    [ ] Nutrition Support    [x ] Other: MVI       Monitoring and Evaluation:     [x ] PO intake [ x] Tolerance to diet prescription [ x] weights [x ] follow up per protocol    [ x] other: need for coumadin ed reinforcement Source: Patient [x ]    Family [ ]     other [x ]PCA, chart since previous assess on 4/25, which I completed.   seen for malnutrition follow up  admitted for chief complaint of fall, decreased po intake seen for follow up  nutrition related problems:  Silent aspiration,   Acute kidney injury  Uremic encephalopathy.  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 nutritional deficiencies.       Diet :  DYS3HC- 2 x magic cups daily.  Silent aspiration, initial encounter.  seen on [facility]  - after an extensive GOC discussion with health care proxy, will continue with pleasure feeds despite at risk for aspiration. HCP aware of risks and benefits.  - c/w soft diet.       Patient reports [ ] nausea  [ ] vomiting [ ] diarrhea [ ] constipation  [ ]chewing problems [ ] swallowing issues  [x ] other: dementia, able to manage interview. believes not getting enough food, discussed snacks and supplements- he could not recall consuming magic cups. he likes puddings so will continue. obtained snack preferences: fruit, puddings, cake, peanut butter. provided.  Current Weight: 58.4kg  % Weight Change: 2% gain since previous assess on 4/25    Pertinent Medications: MEDICATIONS  (STANDING):  enoxaparin Injectable 60 milliGRAM(s) SubCutaneous two times a day  tamsulosin 0.4 milliGRAM(s) Oral at bedtime    MEDICATIONS  (PRN):    Pertinent Labs:  04-28 Na143 mmol/L Glu 88 mg/dL K+ 3.8 mmol/L Cr  0.90 mg/dL BUN 23 mg/dL 04-27 Phos 2.0 mg/dL<L        Skin: stage 1 sacrum, +2 right left, ankle foot    Estimated Needs:   [x ] no change since previous assessment  [ ] recalculated:       Previous Nutrition Diagnosis:     [ ] Inadequate Energy Intake [ ]Inadequate Oral Intake [ ] Excessive Energy Intake     [ ] Underweight [ ] Increased Nutrient Needs [ ] Overweight/Obesity     [ ] Altered GI Function [ ] Unintended Weight Loss [ ] Food & Nutrition Related Knowledge Deficit [ x] Malnutrition-severe         Nutrition Diagnosis is [x ] ongoing  [ ] resolved [ ] not applicable -being addressed with snacks and supplements         New Nutrition Diagnosis: [x ] not applicable    [ ] Inadequate Protein Energy Intake [ ]Inadequate Oral Intake [ ] Excessive Energy Intake     [ ] Underweight [ ] Increased Nutrient Needs [ ] Overweight/Obesity     [ ] Altered GI Function [ ] Unintended Weight Loss [ ] Food & Nutrition Related Knowledge Deficit[ ] Limited Adherence to nutrition related recommendations [ ] Malnutrition  [ ] other: Free text       Related to:      As evidenced by:      Interventions:     Recommend    [ ] Change Diet To:    [x ] Nutrition Supplement-continue magic cups    [ ] Nutrition Support    [x ] Other: MVI       Monitoring and Evaluation:     [x ] PO intake [ x] Tolerance to diet prescription [ x] weights [x ] follow up per protocol    [ x] other: need for coumadin ed reinforcement Source: Patient [x ]    Family [ ]     other [x ]PCA, chart since previous assess on 4/25, which I completed.   seen for malnutrition follow up  admitted for chief complaint of fall, decreased po intake seen for follow up  nutrition related problems:  Silent aspiration,  Acute kidney injury  Uremic encephalopathy.  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 nutritional deficiencies.       Diet :  DYS3HC- 2 x magic cups daily.  Silent aspiration, initial encounter.  seen on [facility] [place]  - after an extensive GOC discussion with health care proxy, will continue with pleasure feeds despite at risk for aspiration. HCP aware of risks and benefits.  - c/w soft diet.       Patient reports [ ] nausea  [ ] vomiting [ ] diarrhea [ ] constipation  [ ]chewing problems [ ] swallowing issues  [x ] other: dementia, able to manage interview. believes not getting enough food, discussed snacks and supplements- he could not recall consuming magic cups. he likes puddings so will continue. obtained snack preferences: fruit, puddings, cake, peanut butter. provided.  Current Weight: 58.4kg  % Weight Change: 2% gain since previous assess on 4/25    Pertinent Medications: MEDICATIONS  (STANDING):  enoxaparin Injectable 60 milliGRAM(s) SubCutaneous two times a day  tamsulosin 0.4 milliGRAM(s) Oral at bedtime    MEDICATIONS  (PRN):    Pertinent Labs:  04-28 Na143 mmol/L Glu 88 mg/dL K+ 3.8 mmol/L Cr  0.90 mg/dL BUN 23 mg/dL 04-27 Phos 2.0 mg/dL<L        Skin: stage 1 sacrum, +2 right left, ankle foot    Estimated Needs:   [x ] no change since previous assessment  [ ] recalculated:       Previous Nutrition Diagnosis:     [ ] Inadequate Energy Intake [ ]Inadequate Oral Intake [ ] Excessive Energy Intake     [ ] Underweight [ ] Increased Nutrient Needs [ ] Overweight/Obesity     [ ] Altered GI Function [ ] Unintended Weight Loss [ ] Food & Nutrition Related Knowledge Deficit [ x] Malnutrition-severe         Nutrition Diagnosis is [x ] ongoing  [ ] resolved [ ] not applicable -being addressed with snacks and supplements         New Nutrition Diagnosis: [x ] not applicable    [ ] Inadequate Protein Energy Intake [ ]Inadequate Oral Intake [ ] Excessive Energy Intake     [ ] Underweight [ ] Increased Nutrient Needs [ ] Overweight/Obesity     [ ] Altered GI Function [ ] Unintended Weight Loss [ ] Food & Nutrition Related Knowledge Deficit[ ] Limited Adherence to nutrition related recommendations [ ] Malnutrition  [ ] other: Free text       Related to:      As evidenced by:      Interventions:     Recommend    [ ] Change Diet To:    [x ] Nutrition Supplement-continue magic cups    [ ] Nutrition Support    [ ] Other:       Monitoring and Evaluation:     [x ] PO intake [ x] Tolerance to diet prescription [ x] weights [x ] follow up per protocol    [ x] other: need for coumadin ed reinforcement

## 2018-04-30 NOTE — PROGRESS NOTE ADULT - SUBJECTIVE AND OBJECTIVE BOX
Patient is a 86y old  Male who presents with a chief complaint of fall, decreased po intake (26 Apr 2018 12:50)      SUBJECTIVE / OVERNIGHT EVENTS: no events overnight. mental status unchanged. cat draining clear urine. Pending rehab placement    MEDICATIONS  (STANDING):  tamsulosin 0.4 milliGRAM(s) Oral at bedtime      I&O's Summary    29 Apr 2018 07:01  -  30 Apr 2018 07:00  --------------------------------------------------------  IN: 1630 mL / OUT: 725 mL / NET: 905 mL    30 Apr 2018 07:01  -  30 Apr 2018 12:39  --------------------------------------------------------  IN: 240 mL / OUT: 300 mL / NET: -60 mL      Vital Signs Last 24 Hrs  T(C): 36.6 (30 Apr 2018 05:35), Max: 36.7 (29 Apr 2018 14:21)  T(F): 97.8 (30 Apr 2018 05:35), Max: 98 (29 Apr 2018 14:21)  HR: 78 (30 Apr 2018 05:35) (63 - 85)  BP: 131/73 (30 Apr 2018 05:35) (127/75 - 131/73)  BP(mean): --  RR: 18 (30 Apr 2018 05:35) (16 - 18)  SpO2: 97% (30 Apr 2018 05:35) (95% - 97%)    PHYSICAL EXAM:  GENERAL: elderly cachectic gentleman, frail, NAD and resting comfortably   HEAD:  Atraumatic, Normocephalic  ENT: EOMI, PERRLA, conjunctiva and sclera clear, Neck supple, No JVD, dry MM   CHEST/LUNG: Pectus excavatum, Clear to auscultation bilaterally; No wheeze, equal breath sounds bilaterally   HEART: RRR, +S1/S2, no m/r/g   ABDOMEN: Soft, Nontender, Nondistended  EXTREMITIES:  +b/l LE edema, LUE wrapped in guaze   NEUROLOGY: chronic, AAOx1(oriented to person not place or time), non-focal, cranial nerves intact  : cat draining clear yellow urine    LABS:    PT/INR - ( 30 Apr 2018 06:33 )   PT: 33.2 sec;   INR: 3.01 ratio Patient is a 86y old  Male who presents with a chief complaint of fall, decreased po intake (26 Apr 2018 12:50)      SUBJECTIVE / OVERNIGHT EVENTS: no events overnight. mental status unchanged. cat draining clear urine. Pending rehab placement    MEDICATIONS  (STANDING):  tamsulosin 0.4 milliGRAM(s) Oral at bedtime      I&O's Summary    29 Apr 2018 07:01  -  30 Apr 2018 07:00  --------------------------------------------------------  IN: 1630 mL / OUT: 725 mL / NET: 905 mL    30 Apr 2018 07:01  -  30 Apr 2018 12:39  --------------------------------------------------------  IN: 240 mL / OUT: 300 mL / NET: -60 mL      Vital Signs Last 24 Hrs  T(C): 36.6 (30 Apr 2018 05:35), Max: 36.7 (29 Apr 2018 14:21)  T(F): 97.8 (30 Apr 2018 05:35), Max: 98 (29 Apr 2018 14:21)  HR: 78 (30 Apr 2018 05:35) (63 - 85)  BP: 131/73 (30 Apr 2018 05:35) (127/75 - 131/73)  BP(mean): --  RR: 18 (30 Apr 2018 05:35) (16 - 18)  SpO2: 97% (30 Apr 2018 05:35) (95% - 97%)    PHYSICAL EXAM:  GENERAL: elderly cachectic gentleman, frail, NAD and resting comfortably   HEAD:  Atraumatic, Normocephalic  ENT: EOMI, PERRLA, conjunctiva and sclera clear, Neck supple, No JVD, dry MM   CHEST/LUNG: Pectus excavatum, Clear to auscultation bilaterally; No wheeze, equal breath sounds bilaterally   HEART: RRR, +S1/S2, no m/r/g   ABDOMEN: Soft, Nontender, Nondistended  EXTREMITIES:  +b/l LE edema, LUE wrapped in guaze   NEUROLOGY: chronic, AAOx1(oriented to person not place or time), non-focal, cranial nerves intact. 3/5 le strenght b/l.   : cat draining clear yellow urine    LABS:    PT/INR - ( 30 Apr 2018 06:33 )   PT: 33.2 sec;   INR: 3.01 ratio

## 2018-04-30 NOTE — PROGRESS NOTE ADULT - PROBLEM SELECTOR PLAN 2
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  - undergoing medicaid eval, will attempt to place patient in long term facility care.  - ,  aware, pending placement

## 2018-04-30 NOTE — PROGRESS NOTE ADULT - PROBLEM SELECTOR PLAN 9
DVT ppx: therapeutic on coumadin  Diet: Soft  Dispo: pending [facility] placement, will need CT chest in 6 weeks to evaluate interval improvement of PNA.    Susan Palma PGY-1  Spectre [id number]  Pager # [phone]/ [phone]

## 2018-04-30 NOTE — PROGRESS NOTE ADULT - ATTENDING COMMENTS
agree with excellent note a [facility].  Patient to likely have supratherapeutic INR tomorrow.  will continue to dose lwoer dose of 3mg coumadin.  D/C lovenox.  Pending rehab placement.

## 2018-04-30 NOTE — PROGRESS NOTE ADULT - PROBLEM SELECTOR PLAN 6
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 nutritional deficiencies. CT head w/out e/o hemorrhage or mass, although concerning for NPH. B12, folate,  TSH, RPR, and HIV normal .  - continue to monitor mental status, appears this will be new baseline

## 2018-04-30 NOTE — PROGRESS NOTE ADULT - PROBLEM SELECTOR PLAN 5
RESOLVED  - on admission elevated Scr d/t post obstructive uropathy now resolved s/p cat with multiple failed TOV.  - Will go to rehab with cat.  - on flomax

## 2018-04-30 NOTE — PROGRESS NOTE ADULT - PROBLEM SELECTOR PLAN 1
found on [facility] duplex b/l, appears unprovoked  - will need AC atleast for 3-6 months.  - started on coumadin first dose 4/25, dose daily, goal INR 2-3; will discontinue therapeutic lovenox as completed 5 days of bridging and high risk of bleeding with supratherapeutic Inr.

## 2018-05-01 VITALS — SYSTOLIC BLOOD PRESSURE: 154 MMHG | DIASTOLIC BLOOD PRESSURE: 84 MMHG | HEART RATE: 88 BPM

## 2018-05-01 LAB
INR BLD: 2.83 RATIO — HIGH (ref 0.88–1.16)
PROTHROM AB SERPL-ACNC: 31.5 SEC — HIGH (ref 9.8–12.7)

## 2018-05-01 PROCEDURE — 93970 EXTREMITY STUDY: CPT

## 2018-05-01 PROCEDURE — 87086 URINE CULTURE/COLONY COUNT: CPT

## 2018-05-01 PROCEDURE — 97530 THERAPEUTIC ACTIVITIES: CPT

## 2018-05-01 PROCEDURE — 84132 ASSAY OF SERUM POTASSIUM: CPT

## 2018-05-01 PROCEDURE — 86901 BLOOD TYPING SEROLOGIC RH(D): CPT

## 2018-05-01 PROCEDURE — 80048 BASIC METABOLIC PNL TOTAL CA: CPT

## 2018-05-01 PROCEDURE — 83605 ASSAY OF LACTIC ACID: CPT

## 2018-05-01 PROCEDURE — 99285 EMERGENCY DEPT VISIT HI MDM: CPT | Mod: 25

## 2018-05-01 PROCEDURE — 85027 COMPLETE CBC AUTOMATED: CPT

## 2018-05-01 PROCEDURE — 82803 BLOOD GASES ANY COMBINATION: CPT

## 2018-05-01 PROCEDURE — 86850 RBC ANTIBODY SCREEN: CPT

## 2018-05-01 PROCEDURE — 82570 ASSAY OF URINE CREATININE: CPT

## 2018-05-01 PROCEDURE — 85730 THROMBOPLASTIN TIME PARTIAL: CPT

## 2018-05-01 PROCEDURE — 82435 ASSAY OF BLOOD CHLORIDE: CPT

## 2018-05-01 PROCEDURE — 86780 TREPONEMA PALLIDUM: CPT

## 2018-05-01 PROCEDURE — 82947 ASSAY GLUCOSE BLOOD QUANT: CPT

## 2018-05-01 PROCEDURE — 85014 HEMATOCRIT: CPT

## 2018-05-01 PROCEDURE — 83735 ASSAY OF MAGNESIUM: CPT

## 2018-05-01 PROCEDURE — 84100 ASSAY OF PHOSPHORUS: CPT

## 2018-05-01 PROCEDURE — 87040 BLOOD CULTURE FOR BACTERIA: CPT

## 2018-05-01 PROCEDURE — 51702 INSERT TEMP BLADDER CATH: CPT

## 2018-05-01 PROCEDURE — 99239 HOSP IP/OBS DSCHRG MGMT >30: CPT

## 2018-05-01 PROCEDURE — 93005 ELECTROCARDIOGRAM TRACING: CPT | Mod: XU

## 2018-05-01 PROCEDURE — 92611 MOTION FLUOROSCOPY/SWALLOW: CPT

## 2018-05-01 PROCEDURE — 87449 NOS EACH ORGANISM AG IA: CPT

## 2018-05-01 PROCEDURE — 83880 ASSAY OF NATRIURETIC PEPTIDE: CPT

## 2018-05-01 PROCEDURE — 82962 GLUCOSE BLOOD TEST: CPT

## 2018-05-01 PROCEDURE — 71045 X-RAY EXAM CHEST 1 VIEW: CPT

## 2018-05-01 PROCEDURE — 84443 ASSAY THYROID STIM HORMONE: CPT

## 2018-05-01 PROCEDURE — 96375 TX/PRO/DX INJ NEW DRUG ADDON: CPT | Mod: XU

## 2018-05-01 PROCEDURE — 80053 COMPREHEN METABOLIC PANEL: CPT

## 2018-05-01 PROCEDURE — 74230 X-RAY XM SWLNG FUNCJ C+: CPT

## 2018-05-01 PROCEDURE — 92610 EVALUATE SWALLOWING FUNCTION: CPT

## 2018-05-01 PROCEDURE — 74176 CT ABD & PELVIS W/O CONTRAST: CPT

## 2018-05-01 PROCEDURE — 82330 ASSAY OF CALCIUM: CPT

## 2018-05-01 PROCEDURE — 84153 ASSAY OF PSA TOTAL: CPT

## 2018-05-01 PROCEDURE — 85610 PROTHROMBIN TIME: CPT

## 2018-05-01 PROCEDURE — 97110 THERAPEUTIC EXERCISES: CPT

## 2018-05-01 PROCEDURE — 97162 PT EVAL MOD COMPLEX 30 MIN: CPT

## 2018-05-01 PROCEDURE — 84154 ASSAY OF PSA FREE: CPT

## 2018-05-01 PROCEDURE — 97116 GAIT TRAINING THERAPY: CPT

## 2018-05-01 PROCEDURE — 82607 VITAMIN B-12: CPT

## 2018-05-01 PROCEDURE — 84156 ASSAY OF PROTEIN URINE: CPT

## 2018-05-01 PROCEDURE — 81001 URINALYSIS AUTO W/SCOPE: CPT

## 2018-05-01 PROCEDURE — 71250 CT THORAX DX C-: CPT

## 2018-05-01 PROCEDURE — 84295 ASSAY OF SERUM SODIUM: CPT

## 2018-05-01 PROCEDURE — 82550 ASSAY OF CK (CPK): CPT

## 2018-05-01 PROCEDURE — 70450 CT HEAD/BRAIN W/O DYE: CPT

## 2018-05-01 PROCEDURE — 83935 ASSAY OF URINE OSMOLALITY: CPT

## 2018-05-01 PROCEDURE — 84540 ASSAY OF URINE/UREA-N: CPT

## 2018-05-01 PROCEDURE — 80307 DRUG TEST PRSMV CHEM ANLYZR: CPT

## 2018-05-01 PROCEDURE — 87538 HIV-2 PROBE&REVRSE TRNSCRIPJ: CPT

## 2018-05-01 PROCEDURE — 96374 THER/PROPH/DIAG INJ IV PUSH: CPT | Mod: XU

## 2018-05-01 PROCEDURE — 86900 BLOOD TYPING SEROLOGIC ABO: CPT

## 2018-05-01 PROCEDURE — 84300 ASSAY OF URINE SODIUM: CPT

## 2018-05-01 PROCEDURE — 82746 ASSAY OF FOLIC ACID SERUM: CPT

## 2018-05-01 RX ORDER — WARFARIN SODIUM 2.5 MG/1
2 TABLET ORAL ONCE
Qty: 0 | Refills: 0 | Status: DISCONTINUED | OUTPATIENT
Start: 2018-05-01 | End: 2018-05-01

## 2018-05-01 RX ORDER — WARFARIN SODIUM 2.5 MG/1
3 TABLET ORAL ONCE
Qty: 0 | Refills: 0 | Status: DISCONTINUED | OUTPATIENT
Start: 2018-05-01 | End: 2018-05-01

## 2018-05-01 RX ORDER — WARFARIN SODIUM 2.5 MG/1
1 TABLET ORAL
Qty: 0 | Refills: 0 | COMMUNITY
Start: 2018-05-01

## 2018-05-01 RX ORDER — TAMSULOSIN HYDROCHLORIDE 0.4 MG/1
1 CAPSULE ORAL
Qty: 0 | Refills: 0 | COMMUNITY
Start: 2018-05-01

## 2018-05-01 NOTE — PROGRESS NOTE ADULT - PROBLEM SELECTOR PLAN 1
found on [facility] duplex b/l, appears unprovoked  - will need AC atleast for 3-6 months.  - started on coumadin first dose 4/25, dose daily, goal INR 2-3; will discontinue therapeutic lovenox as completed 5 days of bridging with multiple therapeutic INRs

## 2018-05-01 NOTE — PROGRESS NOTE ADULT - PROBLEM SELECTOR PROBLEM 1
Deep vein thrombosis (DVT) of distal vein of both lower extremities, unspecified chronicity
Hyperkalemia
Hyperkalemia
Hypernatremia
MATTHEW (acute kidney injury)
Hypernatremia
Deep vein thrombosis (DVT) of distal vein of both lower extremities, unspecified chronicity
Hypernatremia

## 2018-05-01 NOTE — PROGRESS NOTE ADULT - ASSESSMENT
87 y/o cachectic M no known pmhx presenting s/p fall, found to be hyperkalemic, MATTHEW 2/ 2 obstructive uropathy +/- prerenal component, acute encephalopathy most likely 2/2 to uremia vs progressive dementia, anion gap metabolic acidosis 2/2 uremia and sepsis likely 2/2 CAP/aspiration pna, b/l DVTs, hypernatremia, MATTHEW. Remains HD stable and clinically improved pending [facility] placement.

## 2018-05-01 NOTE — PROGRESS NOTE ADULT - PROBLEM SELECTOR PLAN 9
DVT ppx: therapeutic on coumadin  Diet: Soft  Dispo: pending [facility] placement, will need CT chest in 6 weeks to evaluate interval improvement of PNA.    Susan Palma PGY-1  Spectre [id number]  Pager # [phone]/ [phone]

## 2018-05-01 NOTE — PROGRESS NOTE ADULT - SUBJECTIVE AND OBJECTIVE BOX
Patient is a 86y old  Male who presents with a chief complaint of fall, decreased po intake, decreased UOP (26 Apr 2018 12:50)      SUBJECTIVE / OVERNIGHT EVENTS: no events overnight. Pending rehab placement. Mental status unchanged from yesterday.    MEDICATIONS  (STANDING):  tamsulosin 0.4 milliGRAM(s) Oral at bedtime    MEDICATIONS  (PRN):        CAPILLARY BLOOD GLUCOSE        I&O's Summary    30 Apr 2018 07:01  -  01 May 2018 07:00  --------------------------------------------------------  IN: 240 mL / OUT: 1200 mL / NET: -960 mL    Vital Signs Last 24 Hrs  T(C): 36.6 (01 May 2018 06:11), Max: 37.1 (30 Apr 2018 20:28)  T(F): 97.8 (01 May 2018 06:11), Max: 98.8 (30 Apr 2018 20:28)  HR: 93 (01 May 2018 06:11) (69 - 93)  BP: 138/85 (01 May 2018 06:11) (133/75 - 141/73)  BP(mean): --  RR: 18 (01 May 2018 06:11) (18 - 18)  SpO2: 97% (01 May 2018 06:11) (97% - 100%)    PHYSICAL EXAM:  GENERAL: elderly cachectic gentleman, frail, NAD and resting comfortably   HEAD:  Atraumatic, Normocephalic  ENT: EOMI, PERRLA, conjunctiva and sclera clear, Neck supple, No JVD, dry MM   CHEST/LUNG: Pectus excavatum, Clear to auscultation bilaterally; No wheeze, equal breath sounds bilaterally   HEART: RRR, +S1/S2, no m/r/g   ABDOMEN: Soft, Nontender, Nondistended  EXTREMITIES:  +b/l LE edema, LUE wrapped in guaze   NEUROLOGY: chronic, AAOx1(oriented to person not place or time), non-focal, cranial nerves intact. 3/5 le strenght b/l.   : cat draining clear yellow urine    LABS:  PT/INR - ( 01 May 2018 05:53 )   PT: 31.5 sec;   INR: 2.83 ratio         RADIOLOGY & ADDITIONAL TESTS:    Imaging Personally Reviewed: N/A    Consultant(s) Notes Reviewed:  N/A    Care Discussed with Consultants/Other Providers:

## 2018-05-01 NOTE — PROGRESS NOTE ADULT - PROVIDER SPECIALTY LIST ADULT
Internal Medicine
Nephrology
Internal Medicine

## 2018-05-04 ENCOUNTER — INPATIENT (INPATIENT)
Facility: HOSPITAL | Age: 83
LOS: 6 days | Discharge: NURSING, REHAB OR LONG-TERM CARE | DRG: 377 | End: 2018-05-11
Attending: INTERNAL MEDICINE | Admitting: HOSPITALIST
Payer: MEDICARE

## 2018-05-04 VITALS
WEIGHT: 160.06 LBS | TEMPERATURE: 98 F | HEART RATE: 108 BPM | SYSTOLIC BLOOD PRESSURE: 137 MMHG | OXYGEN SATURATION: 95 % | DIASTOLIC BLOOD PRESSURE: 73 MMHG | HEIGHT: 68 IN

## 2018-05-04 DIAGNOSIS — I82.409 ACUTE EMBOLISM AND THROMBOSIS OF UNSPECIFIED DEEP VEINS OF UNSPECIFIED LOWER EXTREMITY: ICD-10-CM

## 2018-05-04 DIAGNOSIS — Z79.01 LONG TERM (CURRENT) USE OF ANTICOAGULANTS: ICD-10-CM

## 2018-05-04 DIAGNOSIS — N40.0 BENIGN PROSTATIC HYPERPLASIA WITHOUT LOWER URINARY TRACT SYMPTOMS: ICD-10-CM

## 2018-05-04 DIAGNOSIS — F03.90 UNSPECIFIED DEMENTIA WITHOUT BEHAVIORAL DISTURBANCE: ICD-10-CM

## 2018-05-04 DIAGNOSIS — Z71.89 OTHER SPECIFIED COUNSELING: ICD-10-CM

## 2018-05-04 DIAGNOSIS — K40.90 UNILATERAL INGUINAL HERNIA, WITHOUT OBSTRUCTION OR GANGRENE, NOT SPECIFIED AS RECURRENT: ICD-10-CM

## 2018-05-04 DIAGNOSIS — D50.0 IRON DEFICIENCY ANEMIA SECONDARY TO BLOOD LOSS (CHRONIC): ICD-10-CM

## 2018-05-04 DIAGNOSIS — Z29.9 ENCOUNTER FOR PROPHYLACTIC MEASURES, UNSPECIFIED: ICD-10-CM

## 2018-05-04 DIAGNOSIS — K92.2 GASTROINTESTINAL HEMORRHAGE, UNSPECIFIED: ICD-10-CM

## 2018-05-04 DIAGNOSIS — E43 UNSPECIFIED SEVERE PROTEIN-CALORIE MALNUTRITION: ICD-10-CM

## 2018-05-04 LAB
ALBUMIN SERPL ELPH-MCNC: 3.2 G/DL — LOW (ref 3.3–5)
ALP SERPL-CCNC: 111 U/L — SIGNIFICANT CHANGE UP (ref 40–120)
ALT FLD-CCNC: 21 U/L — SIGNIFICANT CHANGE UP (ref 10–45)
ANION GAP SERPL CALC-SCNC: 12 MMOL/L — SIGNIFICANT CHANGE UP (ref 5–17)
APPEARANCE UR: CLEAR — SIGNIFICANT CHANGE UP
APTT BLD: 38.5 SEC — HIGH (ref 27.5–37.4)
APTT BLD: 44.8 SEC — HIGH (ref 27.5–37.4)
AST SERPL-CCNC: 22 U/L — SIGNIFICANT CHANGE UP (ref 10–40)
BACTERIA # UR AUTO: ABNORMAL /HPF
BASE EXCESS BLDV CALC-SCNC: 6.2 MMOL/L — HIGH (ref -2–2)
BASOPHILS # BLD AUTO: 0 K/UL — SIGNIFICANT CHANGE UP (ref 0–0.2)
BASOPHILS NFR BLD AUTO: 0 % — SIGNIFICANT CHANGE UP (ref 0–2)
BILIRUB SERPL-MCNC: 0.5 MG/DL — SIGNIFICANT CHANGE UP (ref 0.2–1.2)
BILIRUB UR-MCNC: NEGATIVE — SIGNIFICANT CHANGE UP
BLD GP AB SCN SERPL QL: NEGATIVE — SIGNIFICANT CHANGE UP
BUN SERPL-MCNC: 28 MG/DL — HIGH (ref 7–23)
CA-I SERPL-SCNC: 1.2 MMOL/L — SIGNIFICANT CHANGE UP (ref 1.12–1.3)
CALCIUM SERPL-MCNC: 9.2 MG/DL — SIGNIFICANT CHANGE UP (ref 8.4–10.5)
CHLORIDE BLDV-SCNC: 102 MMOL/L — SIGNIFICANT CHANGE UP (ref 96–108)
CHLORIDE SERPL-SCNC: 100 MMOL/L — SIGNIFICANT CHANGE UP (ref 96–108)
CO2 BLDV-SCNC: 33 MMOL/L — HIGH (ref 22–30)
CO2 SERPL-SCNC: 30 MMOL/L — SIGNIFICANT CHANGE UP (ref 22–31)
COLOR SPEC: YELLOW — SIGNIFICANT CHANGE UP
CREAT SERPL-MCNC: 1.06 MG/DL — SIGNIFICANT CHANGE UP (ref 0.5–1.3)
DIFF PNL FLD: ABNORMAL
EOSINOPHIL # BLD AUTO: 0 K/UL — SIGNIFICANT CHANGE UP (ref 0–0.5)
EOSINOPHIL NFR BLD AUTO: 0 % — SIGNIFICANT CHANGE UP (ref 0–6)
EPI CELLS # UR: SIGNIFICANT CHANGE UP /HPF
GAS PNL BLDV: 141 MMOL/L — SIGNIFICANT CHANGE UP (ref 136–145)
GAS PNL BLDV: SIGNIFICANT CHANGE UP
GAS PNL BLDV: SIGNIFICANT CHANGE UP
GLUCOSE BLDV-MCNC: 121 MG/DL — HIGH (ref 70–99)
GLUCOSE SERPL-MCNC: 131 MG/DL — HIGH (ref 70–99)
GLUCOSE UR QL: NEGATIVE — SIGNIFICANT CHANGE UP
HCO3 BLDV-SCNC: 32 MMOL/L — HIGH (ref 21–29)
HCT VFR BLD CALC: 29.8 % — LOW (ref 39–50)
HCT VFR BLD CALC: 34.4 % — LOW (ref 39–50)
HCT VFR BLDA CALC: 35 % — LOW (ref 39–50)
HGB BLD CALC-MCNC: 11.2 G/DL — LOW (ref 13–17)
HGB BLD-MCNC: 10 G/DL — LOW (ref 13–17)
HGB BLD-MCNC: 11.2 G/DL — LOW (ref 13–17)
INR BLD: 1.44 RATIO — HIGH (ref 0.88–1.16)
INR BLD: 3.35 RATIO — HIGH (ref 0.88–1.16)
KETONES UR-MCNC: NEGATIVE — SIGNIFICANT CHANGE UP
LACTATE BLDV-MCNC: 1.3 MMOL/L — SIGNIFICANT CHANGE UP (ref 0.7–2)
LACTATE BLDV-MCNC: 3.6 MMOL/L — HIGH (ref 0.7–2)
LEUKOCYTE ESTERASE UR-ACNC: ABNORMAL
LYMPHOCYTES # BLD AUTO: 0.5 K/UL — LOW (ref 1–3.3)
LYMPHOCYTES # BLD AUTO: 2.5 % — LOW (ref 13–44)
MCHC RBC-ENTMCNC: 30.2 PG — SIGNIFICANT CHANGE UP (ref 27–34)
MCHC RBC-ENTMCNC: 30.9 PG — SIGNIFICANT CHANGE UP (ref 27–34)
MCHC RBC-ENTMCNC: 32.6 GM/DL — SIGNIFICANT CHANGE UP (ref 32–36)
MCHC RBC-ENTMCNC: 33.5 GM/DL — SIGNIFICANT CHANGE UP (ref 32–36)
MCV RBC AUTO: 92.2 FL — SIGNIFICANT CHANGE UP (ref 80–100)
MCV RBC AUTO: 92.6 FL — SIGNIFICANT CHANGE UP (ref 80–100)
MONOCYTES # BLD AUTO: 0.7 K/UL — SIGNIFICANT CHANGE UP (ref 0–0.9)
MONOCYTES NFR BLD AUTO: 3.3 % — SIGNIFICANT CHANGE UP (ref 2–14)
NEUTROPHILS # BLD AUTO: 19.4 K/UL — HIGH (ref 1.8–7.4)
NEUTROPHILS NFR BLD AUTO: 94.2 % — HIGH (ref 43–77)
NITRITE UR-MCNC: NEGATIVE — SIGNIFICANT CHANGE UP
OTHER CELLS CSF MANUAL: 3 ML/DL — LOW (ref 18–22)
PCO2 BLDV: 52 MMHG — HIGH (ref 35–50)
PH BLDV: 7.4 — SIGNIFICANT CHANGE UP (ref 7.35–7.45)
PH UR: 6.5 — SIGNIFICANT CHANGE UP (ref 5–8)
PLATELET # BLD AUTO: 413 K/UL — HIGH (ref 150–400)
PLATELET # BLD AUTO: 501 K/UL — HIGH (ref 150–400)
PO2 BLDV: 19 MMHG — LOW (ref 25–45)
POTASSIUM BLDV-SCNC: 5.4 MMOL/L — HIGH (ref 3.5–5)
POTASSIUM SERPL-MCNC: 4.9 MMOL/L — SIGNIFICANT CHANGE UP (ref 3.5–5.3)
POTASSIUM SERPL-SCNC: 4.9 MMOL/L — SIGNIFICANT CHANGE UP (ref 3.5–5.3)
PROT SERPL-MCNC: 6.6 G/DL — SIGNIFICANT CHANGE UP (ref 6–8.3)
PROT UR-MCNC: SIGNIFICANT CHANGE UP
PROTHROM AB SERPL-ACNC: 15.8 SEC — HIGH (ref 9.8–12.7)
PROTHROM AB SERPL-ACNC: 37.4 SEC — HIGH (ref 9.8–12.7)
RBC # BLD: 3.24 M/UL — LOW (ref 4.2–5.8)
RBC # BLD: 3.72 M/UL — LOW (ref 4.2–5.8)
RBC # FLD: 12.6 % — SIGNIFICANT CHANGE UP (ref 10.3–14.5)
RBC # FLD: 12.6 % — SIGNIFICANT CHANGE UP (ref 10.3–14.5)
RBC CASTS # UR COMP ASSIST: ABNORMAL /HPF (ref 0–2)
RH IG SCN BLD-IMP: NEGATIVE — SIGNIFICANT CHANGE UP
SAO2 % BLDV: 19 % — LOW (ref 67–88)
SODIUM SERPL-SCNC: 142 MMOL/L — SIGNIFICANT CHANGE UP (ref 135–145)
SP GR SPEC: 1.02 — SIGNIFICANT CHANGE UP (ref 1.01–1.02)
UROBILINOGEN FLD QL: NEGATIVE — SIGNIFICANT CHANGE UP
WBC # BLD: 20.1 K/UL — HIGH (ref 3.8–10.5)
WBC # BLD: 20.6 K/UL — HIGH (ref 3.8–10.5)
WBC # FLD AUTO: 20.1 K/UL — HIGH (ref 3.8–10.5)
WBC # FLD AUTO: 20.6 K/UL — HIGH (ref 3.8–10.5)
WBC UR QL: >50 /HPF (ref 0–5)

## 2018-05-04 PROCEDURE — 93010 ELECTROCARDIOGRAM REPORT: CPT

## 2018-05-04 PROCEDURE — 74018 RADEX ABDOMEN 1 VIEW: CPT | Mod: 26

## 2018-05-04 PROCEDURE — 74176 CT ABD & PELVIS W/O CONTRAST: CPT | Mod: 26

## 2018-05-04 PROCEDURE — 99232 SBSQ HOSP IP/OBS MODERATE 35: CPT | Mod: GC

## 2018-05-04 PROCEDURE — 99285 EMERGENCY DEPT VISIT HI MDM: CPT | Mod: 25

## 2018-05-04 RX ORDER — PANTOPRAZOLE SODIUM 20 MG/1
40 TABLET, DELAYED RELEASE ORAL
Qty: 0 | Refills: 0 | Status: DISCONTINUED | OUTPATIENT
Start: 2018-05-05 | End: 2018-05-08

## 2018-05-04 RX ORDER — PANTOPRAZOLE SODIUM 20 MG/1
80 TABLET, DELAYED RELEASE ORAL ONCE
Qty: 0 | Refills: 0 | Status: COMPLETED | OUTPATIENT
Start: 2018-05-04 | End: 2018-05-04

## 2018-05-04 RX ORDER — SODIUM CHLORIDE 9 MG/ML
500 INJECTION INTRAMUSCULAR; INTRAVENOUS; SUBCUTANEOUS ONCE
Qty: 0 | Refills: 0 | Status: COMPLETED | OUTPATIENT
Start: 2018-05-04 | End: 2018-05-04

## 2018-05-04 RX ORDER — PROTHROMBIN COMPLEX CONCENTRATE (HUMAN) 25.5; 16.5; 24; 22; 22; 26 [IU]/ML; [IU]/ML; [IU]/ML; [IU]/ML; [IU]/ML; [IU]/ML
1500 POWDER, FOR SOLUTION INTRAVENOUS ONCE
Qty: 0 | Refills: 0 | Status: COMPLETED | OUTPATIENT
Start: 2018-05-04 | End: 2018-05-04

## 2018-05-04 RX ORDER — SODIUM CHLORIDE 9 MG/ML
1000 INJECTION INTRAMUSCULAR; INTRAVENOUS; SUBCUTANEOUS ONCE
Qty: 0 | Refills: 0 | Status: COMPLETED | OUTPATIENT
Start: 2018-05-04 | End: 2018-05-04

## 2018-05-04 RX ORDER — PHYTONADIONE (VIT K1) 5 MG
5 TABLET ORAL ONCE
Qty: 0 | Refills: 0 | Status: COMPLETED | OUTPATIENT
Start: 2018-05-04 | End: 2018-05-04

## 2018-05-04 RX ORDER — HEPARIN SODIUM 5000 [USP'U]/ML
5000 INJECTION INTRAVENOUS; SUBCUTANEOUS EVERY 8 HOURS
Qty: 0 | Refills: 0 | Status: DISCONTINUED | OUTPATIENT
Start: 2018-05-04 | End: 2018-05-09

## 2018-05-04 RX ADMIN — SODIUM CHLORIDE 1000 MILLILITER(S): 9 INJECTION INTRAMUSCULAR; INTRAVENOUS; SUBCUTANEOUS at 12:56

## 2018-05-04 RX ADMIN — Medication 101 MILLIGRAM(S): at 11:13

## 2018-05-04 RX ADMIN — PROTHROMBIN COMPLEX CONCENTRATE (HUMAN) 400 INTERNATIONAL UNIT(S): 25.5; 16.5; 24; 22; 22; 26 POWDER, FOR SOLUTION INTRAVENOUS at 10:56

## 2018-05-04 RX ADMIN — SODIUM CHLORIDE 500 MILLILITER(S): 9 INJECTION INTRAMUSCULAR; INTRAVENOUS; SUBCUTANEOUS at 08:54

## 2018-05-04 RX ADMIN — HEPARIN SODIUM 5000 UNIT(S): 5000 INJECTION INTRAVENOUS; SUBCUTANEOUS at 22:48

## 2018-05-04 RX ADMIN — PANTOPRAZOLE SODIUM 80 MILLIGRAM(S): 20 TABLET, DELAYED RELEASE ORAL at 08:54

## 2018-05-04 NOTE — H&P ADULT - NSHPLABSRESULTS_GEN_ALL_CORE
Personally reviewed available labs, imaging and ekg  Labs  CBC Full  -  ( 04 May 2018 09:01 )  WBC Count : 20.6 K/uL  Hemoglobin : 11.2 g/dL  Hematocrit : 34.4 %  Platelet Count - Automated : 501 K/uL  Mean Cell Volume : 92.6 fl  Mean Cell Hemoglobin : 30.2 pg  Mean Cell Hemoglobin Concentration : 32.6 gm/dL  Auto Neutrophil # : 19.4 K/uL  Auto Lymphocyte # : 0.5 K/uL  Auto Monocyte # : 0.7 K/uL  Auto Eosinophil # : 0.0 K/uL  Auto Basophil # : 0.0 K/uL  Auto Neutrophil % : 94.2 %  Auto Lymphocyte % : 2.5 %  Auto Monocyte % : 3.3 %  Auto Eosinophil % : 0.0 %  Auto Basophil % : 0.0 %        142  |  100  |  28<H>  ----------------------------<  131<H>  4.9   |  30  |  1.06    Ca    9.2      04 May 2018 09:01  TPro  6.6  /  Alb  3.2<L>  /  TBili  0.5  /  DBili  x   /  AST  22  /  ALT  21  /  AlkPhos  111  05-04  PT/INR - ( 04 May 2018 12:07 )   PT: 15.8 sec;   INR: 1.44 ratio    PTT - ( 04 May 2018 12:07 )  PTT:38.5 sec    INR: 3.35: (18 @ 09:01)  Urinalysis Basic - ( 04 May 2018 13:13 )  Color: Yellow / Appearance: Clear / S.019 / pH: x  Gluc: x / Ketone: Negative  / Bili: Negative / Urobili: Negative   Blood: x / Protein: Trace / Nitrite: Negative   Leuk Esterase: Large / RBC: 5-10 /HPF / WBC >50 /HPF   Sq Epi: x / Non Sq Epi: OCC /HPF / Bacteria: Many /HPF    09:01 - VBG - pH: 7.40  | pCO2: 52    | pO2: 19    | Lactate: 3.6    Imaging  EKG: sinus tachycardia 103 w/ no AMY or TWI w/ [id number] Personally reviewed available labs, imaging and ekg  Labs  CBC Full  -  ( 04 May 2018 09:01 )  WBC Count : 20.6 K/uL  Hemoglobin : 11.2 g/dL  Hematocrit : 34.4 %  Platelet Count - Automated : 501 K/uL  Mean Cell Volume : 92.6 fl  Mean Cell Hemoglobin : 30.2 pg  Mean Cell Hemoglobin Concentration : 32.6 gm/dL  Auto Neutrophil # : 19.4 K/uL  Auto Lymphocyte # : 0.5 K/uL  Auto Monocyte # : 0.7 K/uL  Auto Eosinophil # : 0.0 K/uL  Auto Basophil # : 0.0 K/uL  Auto Neutrophil % : 94.2 %  Auto Lymphocyte % : 2.5 %  Auto Monocyte % : 3.3 %  Auto Eosinophil % : 0.0 %  Auto Basophil % : 0.0 %        142  |  100  |  28<H>  ----------------------------<  131<H>  4.9   |  30  |  1.06    Ca    9.2      04 May 2018 09:01  TPro  6.6  /  Alb  3.2<L>  /  TBili  0.5  /  DBili  x   /  AST  22  /  ALT  21  /  AlkPhos  111  05-04  PT/INR - ( 04 May 2018 12:07 )   PT: 15.8 sec;   INR: 1.44 ratio    PTT - ( 04 May 2018 12:07 )  PTT:38.5 sec    INR: 3.35: (18 @ 09:01)  Urinalysis Basic - ( 04 May 2018 13:13 )  Color: Yellow / Appearance: Clear / S.019 / pH: x  Gluc: x / Ketone: Negative  / Bili: Negative / Urobili: Negative   Blood: x / Protein: Trace / Nitrite: Negative   Leuk Esterase: Large / RBC: 5-10 /HPF / WBC >50 /HPF   Sq Epi: x / Non Sq Epi: OCC /HPF / Bacteria: Many /HPF    09:01 - VBG - pH: 7.40  | pCO2: 52    | pO2: 19    | Lactate: 3.6    Imaging  EKG personally reviewed: sinus tachycardia 103 w/ no AMY or TWI w/ [id number]    Xray abdomen personally reviewed:  contrast within colon, to order CT for further evaluaiton.

## 2018-05-04 NOTE — H&P ADULT - ATTENDING COMMENTS
Agree with above with following addendum    1. Acute blood loss anemia 2/2 likely upper GI bleed.   -patient with hematemesis x2 with coffee ground emesis  -patient with only minimally elevated Inr of 3.3, just started prior admission.  -concern given b/l new DVT, patient is immobile and will be at risk for perpetual dVT, but with GIB with tachycardia.  Will consult vascular cardiology as patient will likely need IVC filter placed this admission.  -protonix BID  -EGD on Monday per GI    2. +UA  -likely colonization.  Will not treat for Abx at this itme.  WBC likely in setting of GIB.  Monitor both.  If febrle or rising WBC can then empirically start ABx.

## 2018-05-04 NOTE — H&P ADULT - NSHPSOCIALHISTORY_GEN_ALL_CORE
unable to reliably obtain from patient. per HCP has been taken in and is living with her. Reports that there is an acute decline in health since early April 2018. She denies that he had significant dementia prior to last hospitalization but does say that a year ago he could push grocery store cart but over last 6mo  has been to weak to go out of home as much

## 2018-05-04 NOTE — H&P ADULT - PROBLEM SELECTOR PLAN 9
spend >40min discussing patients health w/ HCP. gave medical opinion that patient is at end of natural life and appears to be in dying process. Informed that resuscitation process would provide significant harm and pain and HCP aware but for now wants full code. spend >40min discussing patients health w/ HCP. gave medical opinion that patient is at end of natural life and appears to be in dying process. Informed that resuscitation process would provide significant harm and pain and HCP aware but for now wants full code and all treatment modes offered. face to face time spent 35 min discussing patients health w/ HCP. gave medical opinion that patient is at end of natural life and appears to be in dying process. Informed that resuscitation process would provide significant harm and pain and HCP aware but for now wants full code and all treatment modes offered.

## 2018-05-04 NOTE — H&P ADULT - PROBLEM SELECTOR PLAN 1
Patient suspected to have upper GI bleed from elevated INR from warfarin dosing  - GI following. No EGD at this time. will trend CBC q12hr. Obtained consent to transfusion.  - s/p protonix 80mg and will start protonix 40mg BID tomorrow. s/p vitamin K and Kcentra.   - Transfusion goal hb >7.

## 2018-05-04 NOTE — H&P ADULT - PROBLEM SELECTOR PLAN 3
Note to have supratherapeutic INR w/ warfarin  - reversed by ED. monitor INR daily. HOLD AC given bleed.

## 2018-05-04 NOTE — CONSULT NOTE ADULT - SUBJECTIVE AND OBJECTIVE BOX
CC: 'Coffee Ground emesis'    HPI:  86M w/ hx of b/l LE DVT on warfarin, unspecified dementia (A&Ox1), BPH w/ hx of obstructive uropathy, inguinal hernia and recent admission for encephalopathy w/ RLL pnumonia c/b MATTHEW (d/c 5/1/18) presents to [facility] from Rehab with complaint of "coffee ground emesis x2" per report. Pt does not recall encounter, denies any recent episodes of dizziness/syncope. Pt's INR was found to be 3.35 on admission Hg 11.2 (11.1 in 4/28/18), pt rec'd 1500cc IVF, Vit K 5mg IV x1, and Kcentra. Vascular team called to eval for IVCF in setting of potential UGB.     PMH:   Inguinal hernia  BPH (benign prostatic hyperplasia)  Dementia  DVT (deep venous thrombosis)  No pertinent past medical history    PSH:   No significant past surgical history    Medications:     Home Medications:  Home Medications:  tamsulosin 0.4 mg oral capsule: 1 cap(s) orally once a day (at bedtime) (01 May 2018 16:21)  warfarin 3 mg oral tablet: 1 tab(s) orally once a day atleast 3 months (01 May 2018 16:21)    Allergies:  No Known Allergies    Family Hx:  FAMILY HISTORY:  No pertinent family history in first degree relatives    Social History:  Smoking: denies  Alcohol: denies  Drugs: denies    Review of Systems:  Constitutional: [ ] Fever [ ] Chills [ ] Fatigue [ ] Weight change   HEENT: [ ] Blurred vision [ ] Eye Pain [ ] Headache [ ] Runny nose [ ] Sore Throat   Respiratory: [ ] Cough [ ] Wheezing [ ] Shortness of breath  Cardiovascular: [ ] Chest Pain [ ] Palpitations [ ] COYNE [ ] PND [ ] Orthopnea  Gastrointestinal: [ ] Abdominal Pain [ ] Diarrhea [ ] Constipation [ ] Hemorrhoids [ ] Nausea [ ] Vomiting  Genitourinary: [ ] Nocturia [ ] Dysuria [ ] Incontinence  Extremities: [ ] Swelling [ ] Joint Pain  Neurologic: [ ] Focal deficit [ ] Paresthesias [ ] Syncope  Lymphatic: [ ] Swelling [ ] Lymphadenopathy   Skin: [ ] Rash [ ] Ecchymoses [ ] Wounds [ ] Lesions  Psychiatry: [ ] Depression [ ] Suicidal/Homicidal Ideation [ ] Anxiety [ ] Sleep Disturbances  [x ] 10 point review of systems is otherwise negative except as mentioned above            [ ]Unable to obtain    Physical Exam:  T(F): 98.3 (05-04-18 @ 15:16), Max: 98.3 (05-04-18 @ 15:16)  HR: 92 (05-04-18 @ 15:16) (92 - 111)  BP: 130/76 (05-04-18 @ 15:16) (91/71 - 149/77)  RR: 19 (05-04-18 @ 15:16) (16 - 19)  SpO2: 96% (05-04-18 @ 15:16) (94% - 96%)    Daily Height in cm: 172.72 (04 May 2018 08:25)    Daily     Appearance: [ x] Normal [ x] NAD  Eyes: [x ] PERRL [x ] EOMI  HENT: lack of dentition, +e/o coffee ground emesis in buccal cavity and surrounding skin.  [x ]NC/AT  Cardiovascular: [x ] S1 [ x] S2 [x ] RRR [ x] No m/r/g [x ]No edema [x ] no JVP  Respiratory: [x ] Clear to auscultation bilaterally  Gastrointestinal: [x ] Soft [ x] Non-tender [ x] Non-distended [x ] BS+  Musculoskeletal: [x ] No clubbing [x ] No joint deformity   Neurologic: [ x] Non-focal grossly  Skin: [x ] No rashes [ x] No ecchymoses [ x] No cyanosis    Labs:                        11.2   20.6  )-----------( 501      ( 04 May 2018 09:01 )             34.4     05-04    142  |  100  |  28<H>  ----------------------------<  131<H>  4.9   |  30  |  1.06    Ca    9.2      04 May 2018 09:01    TPro  6.6  /  Alb  3.2<L>  /  TBili  0.5  /  DBili  x   /  AST  22  /  ALT  21  /  AlkPhos  111  05-04    PT/INR - ( 04 May 2018 12:07 )   PT: 15.8 sec;   INR: 1.44 ratio <--3.35         PTT - ( 04 May 2018 12:07 )  PTT:38.5 sec      ECG: NSR w/ PVC's    Echo: no echo in EMR    Imaging: < from: [facility] Duplex Lower Ext Vein Scan, Bilat (04.23.18 @ 10:48) >  IMPRESSION: There is extensive occlusive thrombus on the right affecting   the femoral, deep femoral, common femoral and external iliac veins. There   is partially occlusive thrombus affecting the right popliteal vein.    There is also occlusive or near occlusive thrombus affecting the left   deep femoral and common femoral veins.    PATRICIA LILLY M.D., ATTENDING RADIOLOGIST  This document has been electronically signed. Apr 23 2018  2:11PM    < end of copied text >    < from: CT Abdomen and Pelvis No Cont (04.23.18 @ 18:24) >  IMPRESSION: Small right pleural effusion, unchanged.     Parenchymal opacity in the right lower lobe, unchanged. The differential   diagnosis includes infection as wellas neoplasm.    Indeterminate peripheral irregular opacity in the right middle lobe.    Large left inguinal hernia containing nonobstructed bowel and small   amount of fluid.    Deep venous thrombosis is again noted involving the right external iliac  and femoral veins as noted on Doppler examination performed the same day.    Resolution of previously noted bilateral hydronephrosis.    LAURA GARCIA M.D., ATTENDING RADIOLOGIST  This document has been electronically signed. Apr 24 2018  9:15AM    < end of copied text > CC: 'Coffee Ground emesis'    HPI:  86M w/ hx of b/l LE DVT on warfarin, unspecified dementia (A&Ox1), BPH w/ hx of obstructive uropathy, inguinal hernia and recent admission for encephalopathy w/ RLL pnumonia c/b MATTHEW (d/c 5/1/18) presents to [facility] from Rehab with complaint of "coffee ground emesis x2" per report. Pt does not recall encounter, denies any recent episodes of dizziness/syncope. Pt's INR was found to be 3.35 on admission Hg 11.2 (11.1 in 4/28/18), pt rec'd 1500cc IVF, Vit K 5mg IV x1, and Kcentra. Vascular team called to eval for IVCF in setting of potential UGB.     PMH:   Inguinal hernia  BPH (benign prostatic hyperplasia)  Dementia  DVT (deep venous thrombosis)  No pertinent past medical history    PSH:   No significant past surgical history    Medications:     Home Medications:  Home Medications:  tamsulosin 0.4 mg oral capsule: 1 cap(s) orally once a day (at bedtime) (01 May 2018 16:21)  warfarin 3 mg oral tablet: 1 tab(s) orally once a day atleast 3 months (01 May 2018 16:21)    Allergies:  No Known Allergies    Family Hx:  FAMILY HISTORY:  No pertinent family history in first degree relatives    Social History:  Smoking: denies  Alcohol: denies  Drugs: denies    Review of Systems:  Constitutional: [ ] Fever [ ] Chills [ ] Fatigue [ ] Weight change   HEENT: [ ] Blurred vision [ ] Eye Pain [ ] Headache [ ] Runny nose [ ] Sore Throat   Respiratory: [ ] Cough [ ] Wheezing [ ] Shortness of breath  Cardiovascular: [ ] Chest Pain [ ] Palpitations [ ] COYNE [ ] PND [ ] Orthopnea  Gastrointestinal: [ ] Abdominal Pain [ ] Diarrhea [ ] Constipation [ ] Hemorrhoids [ ] Nausea [ ] Vomiting  Genitourinary: [ ] Nocturia [ ] Dysuria [ ] Incontinence  Extremities: [ ] Swelling [ ] Joint Pain  Neurologic: [ ] Focal deficit [ ] Paresthesias [ ] Syncope  Lymphatic: [ ] Swelling [ ] Lymphadenopathy   Skin: [ ] Rash [ ] Ecchymoses [ ] Wounds [ ] Lesions  Psychiatry: [ ] Depression [ ] Suicidal/Homicidal Ideation [ ] Anxiety [ ] Sleep Disturbances  [x ] 10 point review of systems is otherwise negative except as mentioned above            [ ]Unable to obtain    Physical Exam:  T(F): 98.3 (05-04-18 @ 15:16), Max: 98.3 (05-04-18 @ 15:16)  HR: 92 (05-04-18 @ 15:16) (92 - 111)  BP: 130/76 (05-04-18 @ 15:16) (91/71 - 149/77)  RR: 19 (05-04-18 @ 15:16) (16 - 19)  SpO2: 96% (05-04-18 @ 15:16) (94% - 96%)    Daily Height in cm: 172.72 (04 May 2018 08:25)    Daily     Appearance: [ x] Normal [ x] NAD  Eyes: [x ] PERRL [x ] EOMI  HENT: lack of dentition, +e/o coffee ground emesis in buccal cavity and surrounding skin.  [x ]NC/AT  Cardiovascular: [x ] S1 [ x] S2 [x ] RRR [ x] No m/r/g [x ]No edema [x ] no JVP  Respiratory: [x ] Clear to auscultation bilaterally  Gastrointestinal: [x ] Soft [ x] Non-tender [ x] Non-distended [x ] BS+  Musculoskeletal: [x ] No clubbing [x ] No joint deformity   Neurologic: [ x] Non-focal grossly  Skin: [x ] No rashes [ x] No ecchymoses [ x] No cyanosis    Labs:                        11.2   20.6  )-----------( 501      ( 04 May 2018 09:01 )             34.4     05-04    142  |  100  |  28<H>  ----------------------------<  131<H>  4.9   |  30  |  1.06    Ca    9.2      04 May 2018 09:01    TPro  6.6  /  Alb  3.2<L>  /  TBili  0.5  /  DBili  x   /  AST  22  /  ALT  21  /  AlkPhos  111  05-04    PT/INR - ( 04 May 2018 12:07 )   PT: 15.8 sec;   INR: 1.44 ratio <--3.35         PTT - ( 04 May 2018 12:07 )  PTT:38.5 sec      ECG: NSR w/ PVC's    Echo: no echo in EMR    Imaging: < from: VA Duplex Lower Ext Vein Scan, Bilat (04.23.18 @ 10:48) >  IMPRESSION: There is extensive occlusive thrombus on the right affecting   the femoral, deep femoral, common femoral and external iliac veins. There   is partially occlusive thrombus affecting the right popliteal vein.    There is also occlusive or near occlusive thrombus affecting the left   deep femoral and common femoral veins.    PATRICIA LILLY M.D., ATTENDING RADIOLOGIST  This document has been electronically signed. Apr 23 2018  2:11PM    < end of copied text >    < from: CT Abdomen and Pelvis No Cont (04.23.18 @ 18:24) >  IMPRESSION: Small right pleural effusion, unchanged.     Parenchymal opacity in the right lower lobe, unchanged. The differential   diagnosis includes infection as wellas neoplasm.    Indeterminate peripheral irregular opacity in the right middle lobe.    Large left inguinal hernia containing nonobstructed bowel and small   amount of fluid.    Deep venous thrombosis is again noted involving the right external iliac  and femoral veins as noted on Doppler examination performed the same day.    Resolution of previously noted bilateral hydronephrosis.    LAURA GARCIA M.D., ATTENDING RADIOLOGIST  This document has been electronically signed. Apr 24 2018  9:15AM    < end of copied text >

## 2018-05-04 NOTE — H&P ADULT - PROBLEM SELECTOR PLAN 4
B/l DVT as noted previously  - given hx of bleed with transient hypotension, the risk of ac given the patients state of health provides more risk than benefit  - completed GOC w/ HCP who wants to think things over and for now keep full code. She is agreeable to holding ac

## 2018-05-04 NOTE — CONSULT NOTE ADULT - SUBJECTIVE AND OBJECTIVE BOX
HPI: 87 y/o M with Dementia, DVT on Coumadin, BPH, Obstructive Uropathy presents from nursing home for 2 episodes of coffee ground emesis. Pt reports he was doing well and without abdominal pain, when he has sudden emesis that was dark and may have had red blood. He denies fever, chills, weight loss. He denies odynophagia. He reports he may have had dark stools the previous day. He denies previous EGD or Colonoscopy.    PAST MEDICAL & SURGICAL HISTORY:  - DVT  - Obstructive Uropathy  - Dementia  - BPH    REVIEW OF SYSTEMS: negative except as per the HPI.    MEDICATIONS  (STANDING): see list    ALLERGIES: NKDA    SOCIAL HISTORY:  - Alcohol: denies  - Recreational drug use: denies    FAMILY HISTORY:  No pertinent family history in first degree relatives    Vital Signs Last 24 Hrs  T(C): 36.6 (04 May 2018 08:40), Max: 36.8 (04 May 2018 08:25)  T(F): 97.9 (04 May 2018 08:40), Max: 98.2 (04 May 2018 08:25)  HR: 111 (04 May 2018 08:40) (108 - 111)  BP: 114/83 (04 May 2018 08:40) (114/83 - 137/73)  BP(mean): --  RR: 18 (04 May 2018 08:40) (18 - 18)  SpO2: 95% (04 May 2018 08:40) (95% - 95%)    PHYSICAL EXAM:  Constitutional: no acute distress  Eyes: no icterus  Neck: no masses, no LAD  Respiratory: normal inspiratory effort; no wheezing or crackles  Cardiovascular: RRR, normal S1/S2, no murmurs/rubs/gallops  Gastrointestinal: soft, mildly distended, nontender, +BS  Rectal: light brown stool  Extremities: no LE edema  Neurological: AAOx3, no asterixis  Skin: no rashes, bruises, petechiae    LABS:                        11.2   20.6  )-----------( 501      ( 04 May 2018 09:01 )             34.4     142  |  100  |  28<H>  ----------------------------<  131<H>  4.9   |  30  |  1.06    Ca    9.2     04 May 2018 09:01  TPro  6.6  /  Alb  3.2<L>  /  TBili  0.5  /  DBili  x   /  AST  22  /  ALT  21  /  AlkPhos  111    PT/INR - ( 04 May 2018 09:01 )   PT: 37.4 sec;   INR: 3.35 ratio    PTT - ( 04 May 2018 09:01 )  PTT:44.8 sec  LIVER FUNCTIONS - ( 04 May 2018 09:01 )  Alb: 3.2 g/dL / Pro: 6.6 g/dL / ALK PHOS: 111 U/L / ALT: 21 U/L / AST: 22 U/L / GGT: x

## 2018-05-04 NOTE — ED PROVIDER NOTE - MEDICAL DECISION MAKING DETAILS
86 y.o. male discharged from [facility] 2 days ago, was diagnosed with renal failure, b/l DVT and dementia, sent to nursing home on Coumadin, presents today with coffee ground material and guaiac + stool. Will obtain labs, type and screen, protonix, prob blood transfusion, and readmission to hospital. ZR

## 2018-05-04 NOTE — CHART NOTE - NSCHARTNOTEFT_GEN_A_CORE
Patient's HCP wants all measures and full code. Tentative plan for EGD on Monday. Keep npo after midnight.

## 2018-05-04 NOTE — H&P ADULT - PROBLEM SELECTOR PLAN 5
Unspecified dementia  - folate, tsh, b12 normal on last admit  - suspect age-related  -A&Ox1. has exceptional cachexia. Appears to be actively dying with weeks to months. informed HCP. would benefit from further GOC and hospice given degree of advanced illness

## 2018-05-04 NOTE — H&P ADULT - HISTORY OF PRESENT ILLNESS
86M w/ hx of b/l LE DVT on warfarin, unspecified dementia (A&Ox1), BPH w/ hx of obstructive uropathy, inguinal hernia and recent admission for encephalopathy w/ RLL pnumonia c/b MATTHEW (d/c 5/1/18) presents to [facility] from Rehab with complaint of "coffee ground emesis x2" per Rehab RN. Hx collected from Rehab RN(Elham) and from HCP Nicole Tang. Per Rehab Rn, am signout was that the patient had "coffee ground" emesis x2 in the setting of elevated INR of 3.06. RN additionally reported that warfarin dose was held last night and VS of the patient at that time were afebrile, 130/20, 88 and on RA. In discussion with the patient's HCP via telephone, she reports that the patient had complaint of abdominal pain for the last 2d and is frustrated that the rehab did not send the patient for evaluation and the hospital sooner. Patient per chart was treated for RLL pneumonia and new b/l LE DVT (Right femoral/deep femoral/common femoral/external iliac and left deep femoral and common femoral) and was evaluated for encephalopathy from uremia however appeared to have progressive dementia. Unable to obtain other history given that the patient is A&Ox1 and is not reliable however at this time he reports not having pain

## 2018-05-04 NOTE — ED PROVIDER NOTE - PHYSICAL EXAMINATION
Gen: Well appearing, NAD, pale  Head: NCAT  HEENT: MM dry, dried coffee ground emesis around mouth, Normal conjunctiva  Lung: CTAB, no rales, rhonchi or wheezing  CV: RRR, no murmurs, rubs or gallops  Abd: soft, NTND, no rebound or guarding  : cat in place, draining yellow urine. Significant scrotal swelling, nontender  MSK:  No edema, no visible deformities  Neuro: No focal neurologic deficits.   Skin: Warm and dry, no evidence of rash  Psych: normal mood and affect, A&Ox1

## 2018-05-04 NOTE — GOALS OF CARE CONVERSATION - PERSONAL ADVANCE DIRECTIVE - CONVERSATION DETAILS
Called and spoke w/ HCP and informed of current health and condition. Nicole informs that she has known Ian for >35 years from mutual friends and took him in over the most recent years when he had hardship. We discussed that in his current state he may Called and spoke w/ HCP and informed of current health and condition. Nicole informs that she has known Ian for >35 years from mutual friends and took him in over the most recent years when he had hardship. We discussed that in his current state and I indicated that in my opinion he appears to be at the end-stage of his life given his degree fo cachexia and dementia. I informed that in my opinion he would have more harm than benefit w/ significant pain should he complete resuscitation. Nicole indicated that she was unaware of how invasive these measures were and would still want to keep him full code and to receive all available treatments. I informed that at this point in time continuation of warfarin would be dangerous and she was agreeable to current treatment plan and wanted everything done. Nicole reports that she thinks he will do well as the patient has informed that he was optimistic about his condition.    Last HCP was agreeable to benefits and risk of blood transfusion and would like transfusion if clinically warranted. Dr. Burns as witness

## 2018-05-04 NOTE — CONSULT NOTE ADULT - ASSESSMENT
86M PMHx Dementia, BPH/ubstroctive uropathy recent admission w/ MATTHEW and RLL PNA c/b extensive Rt Common femoral, deep femoral, external iliac and popliteal vein thrombosis. Unclear wether provoked or unprovoked as pt is largely bed bound and was found to have RLL opacity concerning for infection vs. malignancy. Would hold off on IVCF for now given that no clear evidence of active GI bleed and would await endoscopy/GI workup for identifiable source.       - f/u GI workup for source of GIB  - would challenge with heparin infusion when ok per GI  - further investigate RLL potential for malignancy  - would hold off on IVCF for now  - monitor H/H    case d/w Dr. Rodríguez. 86M PMHx Dementia, BPH/ubstroctive uropathy recent admission w/ MATTHEW and RLL PNA c/b extensive Rt Common femoral, deep femoral, external iliac and popliteal vein thrombosis and Lt deep common femoral veins. Unclear wether provoked or unprovoked as pt is largely bed bound and was found to have RLL opacity concerning for infection vs. malignancy. Would hold off on IVCF for now given that no clear evidence of active GI bleed and would await endoscopy/GI workup for identifiable source.       - f/u GI workup for source of GIB  - would challenge with heparin infusion when ok per GI  - further investigate RLL potential for malignancy  - would hold off on IVCF for now  - monitor H/H    case d/w Dr. Rodríguez. 86M PMHx Dementia, BPH/ubstroctive uropathy recent admission w/ MATTHEW and RLL PNA c/b extensive Rt Common femoral, deep femoral, external iliac and popliteal vein thrombosis and Lt deep common femoral veins. DVT source as likely provoked given compression of IVC from Bladder 2/2 obstructive uropathy however cannot rule out malignancy as pt was found to have RLL opacity concerning for infection vs. malignancy in the setting of an overall bed bound state. Would hold off on IVCF for now given that no clear evidence of active GI bleed and would await endoscopy/GI workup for identifiable source.       - f/u GI workup for source of GIB  - would challenge with heparin infusion when ok per GI  - further investigate RLL potential for malignancy  - would hold off on IVCF for now  - monitor H/H  - SQH for DVT ppx    case d/w Dr. Rodríguez.

## 2018-05-04 NOTE — CONSULT NOTE ADULT - ASSESSMENT
89 y/o M with Dementia, DVT on Coumadin, BPH, Obstructive Uropathy presents from nursing home for 2 episodes of coffee ground emesis.    Impression:  1) Coffee Ground Emesis - suspect UGI bleeding, ddx Erosive Esophagitis, PUD, Angiectasias, Miranda Ramirez Tear. Clinically stable, and with brown stool on exam.  2) DVT - on Coumadin  3) Leukocytosis    Plan:  - npo  - Protonix 40mg BID  - Monitor Hemoglobin  - Pt preferred to hold off EGD at this time unless his Hb declines and has recurrent symptoms  - Abdominal XR Flat and Upright to r/o Ileus or SBO  - Infectious workup including Blood Cx, UA/UCx and CXR

## 2018-05-04 NOTE — H&P ADULT - PROBLEM SELECTOR PLAN 6
charted BPH w/ elevated PSA  - unclear hx however may possibly have prostate ca given degree of muscle wasting and new dementia. CT head on 4/21 reviewed and w/o acute changes

## 2018-05-04 NOTE — ED ADULT NURSE NOTE - OBJECTIVE STATEMENT
86 y.o. male  BIB EMS from rehab for blood in stool and coffee ground vomiting since last night as per EMS.  EMS reports pt's  last vomiting was 2 hours ago. Pt denies any complaints at this time.  Pt came with indwelling Junior catheter, scrotal swelling.

## 2018-05-04 NOTE — H&P ADULT - ASSESSMENT
86M w/ hx of b/l LE DVT on warfarin, unspecified dementia (A&Ox1), BPH w/ hx of obstructive uropathy, inguinal hernia and recent admission for encephalopathy w/ RLL pnumonia c/b MATTHEW (d/c 5/1/18) presents to [facility] from Rehab with complaint of "coffee ground emesis x2" per Rehab RN and found to have elevated INR reversed by ED w/ suspected upper GI bleed 2/2 anticoagulation.

## 2018-05-04 NOTE — H&P ADULT - PROBLEM SELECTOR PLAN 8
- will need nutrition consult. appears to be in active dying process weeks to months likely from end-stage dementia vs occult malignancy (lung vs prostate)

## 2018-05-04 NOTE — H&P ADULT - PROBLEM SELECTOR PLAN 2
As above. possibility for GI bleed include PUD, exposed vessel vs malignancy. likely precipitated from coumadin. HOLD AC. c/w protonix. monitor w/ cbc.  - NPO

## 2018-05-04 NOTE — H&P ADULT - PROBLEM SELECTOR PLAN 7
Known to have left inguinal hernia which was non-obstructive on last admission. no signs of obstruction at this time but will get abdominal film to evaluate

## 2018-05-05 LAB
ANION GAP SERPL CALC-SCNC: 10 MMOL/L — SIGNIFICANT CHANGE UP (ref 5–17)
BUN SERPL-MCNC: 24 MG/DL — HIGH (ref 7–23)
CALCIUM SERPL-MCNC: 8.1 MG/DL — LOW (ref 8.4–10.5)
CHLORIDE SERPL-SCNC: 106 MMOL/L — SIGNIFICANT CHANGE UP (ref 96–108)
CO2 SERPL-SCNC: 28 MMOL/L — SIGNIFICANT CHANGE UP (ref 22–31)
CREAT SERPL-MCNC: 1.02 MG/DL — SIGNIFICANT CHANGE UP (ref 0.5–1.3)
CULTURE RESULTS: NO GROWTH — SIGNIFICANT CHANGE UP
GLUCOSE SERPL-MCNC: 86 MG/DL — SIGNIFICANT CHANGE UP (ref 70–99)
HCT VFR BLD CALC: 29 % — LOW (ref 39–50)
HCT VFR BLD CALC: 29.9 % — LOW (ref 39–50)
HCT VFR BLD CALC: 31.1 % — LOW (ref 39–50)
HGB BLD-MCNC: 10.3 G/DL — LOW (ref 13–17)
HGB BLD-MCNC: 9.5 G/DL — LOW (ref 13–17)
HGB BLD-MCNC: 9.9 G/DL — LOW (ref 13–17)
MAGNESIUM SERPL-MCNC: 1.9 MG/DL — SIGNIFICANT CHANGE UP (ref 1.6–2.6)
MCHC RBC-ENTMCNC: 30.6 PG — SIGNIFICANT CHANGE UP (ref 27–34)
MCHC RBC-ENTMCNC: 30.8 PG — SIGNIFICANT CHANGE UP (ref 27–34)
MCHC RBC-ENTMCNC: 31 PG — SIGNIFICANT CHANGE UP (ref 27–34)
MCHC RBC-ENTMCNC: 32.6 GM/DL — SIGNIFICANT CHANGE UP (ref 32–36)
MCHC RBC-ENTMCNC: 33.2 GM/DL — SIGNIFICANT CHANGE UP (ref 32–36)
MCHC RBC-ENTMCNC: 33.3 GM/DL — SIGNIFICANT CHANGE UP (ref 32–36)
MCV RBC AUTO: 92.6 FL — SIGNIFICANT CHANGE UP (ref 80–100)
MCV RBC AUTO: 93.3 FL — SIGNIFICANT CHANGE UP (ref 80–100)
MCV RBC AUTO: 93.6 FL — SIGNIFICANT CHANGE UP (ref 80–100)
PHOSPHATE SERPL-MCNC: 2.1 MG/DL — LOW (ref 2.5–4.5)
PLATELET # BLD AUTO: 393 K/UL — SIGNIFICANT CHANGE UP (ref 150–400)
PLATELET # BLD AUTO: 409 K/UL — HIGH (ref 150–400)
PLATELET # BLD AUTO: 432 K/UL — HIGH (ref 150–400)
POTASSIUM SERPL-MCNC: 3.8 MMOL/L — SIGNIFICANT CHANGE UP (ref 3.5–5.3)
POTASSIUM SERPL-SCNC: 3.8 MMOL/L — SIGNIFICANT CHANGE UP (ref 3.5–5.3)
RBC # BLD: 3.1 M/UL — LOW (ref 4.2–5.8)
RBC # BLD: 3.23 M/UL — LOW (ref 4.2–5.8)
RBC # BLD: 3.33 M/UL — LOW (ref 4.2–5.8)
RBC # FLD: 12.7 % — SIGNIFICANT CHANGE UP (ref 10.3–14.5)
RBC # FLD: 12.8 % — SIGNIFICANT CHANGE UP (ref 10.3–14.5)
RBC # FLD: 12.8 % — SIGNIFICANT CHANGE UP (ref 10.3–14.5)
SODIUM SERPL-SCNC: 144 MMOL/L — SIGNIFICANT CHANGE UP (ref 135–145)
SPECIMEN SOURCE: SIGNIFICANT CHANGE UP
WBC # BLD: 20.6 K/UL — HIGH (ref 3.8–10.5)
WBC # BLD: 21.3 K/UL — HIGH (ref 3.8–10.5)
WBC # BLD: 22.4 K/UL — HIGH (ref 3.8–10.5)
WBC # FLD AUTO: 20.6 K/UL — HIGH (ref 3.8–10.5)
WBC # FLD AUTO: 21.3 K/UL — HIGH (ref 3.8–10.5)
WBC # FLD AUTO: 22.4 K/UL — HIGH (ref 3.8–10.5)

## 2018-05-05 PROCEDURE — 99232 SBSQ HOSP IP/OBS MODERATE 35: CPT | Mod: GC

## 2018-05-05 PROCEDURE — 99233 SBSQ HOSP IP/OBS HIGH 50: CPT | Mod: GC

## 2018-05-05 RX ORDER — SODIUM,POTASSIUM PHOSPHATES 278-250MG
1 POWDER IN PACKET (EA) ORAL
Qty: 0 | Refills: 0 | Status: COMPLETED | OUTPATIENT
Start: 2018-05-05 | End: 2018-05-06

## 2018-05-05 RX ADMIN — Medication 1 TABLET(S): at 17:51

## 2018-05-05 RX ADMIN — Medication 1 TABLET(S): at 21:21

## 2018-05-05 RX ADMIN — HEPARIN SODIUM 5000 UNIT(S): 5000 INJECTION INTRAVENOUS; SUBCUTANEOUS at 21:22

## 2018-05-05 RX ADMIN — HEPARIN SODIUM 5000 UNIT(S): 5000 INJECTION INTRAVENOUS; SUBCUTANEOUS at 06:31

## 2018-05-05 RX ADMIN — HEPARIN SODIUM 5000 UNIT(S): 5000 INJECTION INTRAVENOUS; SUBCUTANEOUS at 13:01

## 2018-05-05 RX ADMIN — PANTOPRAZOLE SODIUM 40 MILLIGRAM(S): 20 TABLET, DELAYED RELEASE ORAL at 17:51

## 2018-05-05 RX ADMIN — PANTOPRAZOLE SODIUM 40 MILLIGRAM(S): 20 TABLET, DELAYED RELEASE ORAL at 06:31

## 2018-05-05 NOTE — DIETITIAN INITIAL EVALUATION ADULT. - ENERGY NEEDS
Ht: 68in, Wt: 150lbs (stated), BMI: 22.8, IBW: 154lbs, IBW+/-10%= 139-169lbs, %IBW: 97  Other pertinent objective information: As per chart 86 year old male with PMH of b/l LE DVT on warfarin, unspecified dementia (A&Ox1), BPH w/ hx of obstructive uropathy, inguinal hernia and recent admission for encephalopathy w/ RLL pnumonia c/b MATTHEW (d/c 5/1/18) presents to [facility] from Rehab with complaint of "coffee ground emesis x2" per Rehab RN and found to have elevated INR reversed by ED w/ suspected upper GI bleed 2/2 anticoagulation.  Skin: +2 b/l feet, b/l ankle, +3 scrotum edema; stage 2 midline sacral spine, stage 1 left and right heel pressure injuries.

## 2018-05-05 NOTE — CHART NOTE - NSCHARTNOTEFT_GEN_A_CORE
Upon Nutritional Assessment by the Registered Dietitian your patient was determined to meet criteria / has evidence of the following diagnosis/diagnoses:          [ ]  Mild Protein Calorie Malnutrition        [ ]  Moderate Protein Calorie Malnutrition        [x] Severe Protein Calorie Malnutrition        [ ] Unspecified Protein Calorie Malnutrition        [ ] Underweight / BMI <19        [ ] Morbid Obesity / BMI > 40      Findings as based on:  [x] Comprehensive nutrition assessment   [ ] Nutrition Focused Physical Exam  [ ] Other:       Nutrition Plan/Recommendations:      Please refer to RD initial assessment for recommendations/interventions      PROVIDER Section:     By signing this assessment you are acknowledging and agree with the diagnosis/diagnoses assigned by the Registered Dietitian    Comments:

## 2018-05-05 NOTE — PROGRESS NOTE ADULT - PROBLEM SELECTOR PLAN 2
As above. possibility for GI bleed include PUD, exposed vessel vs malignancy. likely precipitated from coumadin. HOLD AC. c/w protonix. monitor w/ cbc.  - diet had been restarted by overnight team

## 2018-05-05 NOTE — PROGRESS NOTE ADULT - PROBLEM SELECTOR PLAN 3
Note to have supratherapeutic INR w/ warfarin  - reversed by ED. monitor INR daily. HOLD AC given bleed

## 2018-05-05 NOTE — DIETITIAN INITIAL EVALUATION ADULT. - NS AS NUTRI INTERV MEALS SNACK
Continue with current dysphagia 3, soft, honey-thickened liquids. Pt's food preferences obtained. Continue to provide pt with food preferences within diet restrictions. Assisted pt with filling out menu./General/healthful diet

## 2018-05-05 NOTE — PROGRESS NOTE ADULT - SUBJECTIVE AND OBJECTIVE BOX
Cardiology Progress Note  Spectra [id number]    Chief Complaint:  supratherapeutic INR and possible GIB    Interval Events:  No further vomiting or dark stool.    MEDICATIONS:  heparin  Injectable 5000 Unit(s) SubCutaneous every 8 hours  pantoprazole  Injectable 40 milliGRAM(s) IV Push two times a day    PHYSICAL EXAM:  T(C): 36.6 (05-05-18 @ 06:23), Max: 37.1 (05-04-18 @ 22:13)  HR: 90 (05-05-18 @ 06:23) (54 - 109)  BP: 119/66 (05-05-18 @ 06:23) (91/71 - 153/83)  RR: 17 (05-05-18 @ 06:23) (16 - 19)  SpO2: 96% (05-05-18 @ 06:23) (94% - 96%)  Wt(kg): --  I&O's Summary    04 May 2018 07:01  -  05 May 2018 07:00  --------------------------------------------------------  IN: 0 mL / OUT: 500 mL / NET: -500 mL    Daily        Appearance: no acute distress. cachectic  HEENT:   mmm  Cardiovascular: Normal S1 S2, No JVD, No murmurs, No edema  Respiratory: Lungs clear to auscultation	  Psychiatry: Mood & affect appropriate  Gastrointestinal:  soft nt  Skin: No cyanosis	  Neurologic: grossly nonfocal  Extremities: No clubbing, cyanosis  Vascular: Peripheral pulses palpable bilaterally    LABS:	 	  CBC Full  -  ( 05 May 2018 08:16 )  WBC Count : 20.6 K/uL  Hemoglobin : 9.5 g/dL  Hematocrit : 29.0 %  Platelet Count - Automated : 393 K/uL      05-05  144  |  106  |  24<H>  ----------------------------<  86  3.8   |  28  |  1.02    05-04  142  |  100  |  28<H>  ----------------------------<  131<H>  4.9   |  30  |  1.06    Ca    8.1<L>      05 May 2018 07:34  Ca    9.2      04 May 2018 09:01  Phos  2.1     05-05  Mg     1.9     05-05    TPro  6.6  /  Alb  3.2<L>  /  TBili  0.5  /  DBili  x   /  AST  22  /  ALT  21  /  AlkPhos  111  05-04    TELEMETRY: 	  n/a	    PREVIOUS DIAGNOSTIC TESTING:    [ ] Echocardiogram:  [ ] Catheterization:  [ ] Stress Test:  	  < from: CT Abdomen and Pelvis No Cont (04.23.18 @ 18:24) >  IMPRESSION: Small right pleural effusion, unchanged.     Parenchymal opacity in the right lower lobe, unchanged. The differential   diagnosis includes infection as wellas neoplasm.    Indeterminate peripheral irregular opacity in the right middle lobe.    Large left inguinal hernia containing nonobstructed bowel and small   amount of fluid.    Deep venous thrombosis is again noted involving the right external iliac  and femoral veins as noted on Doppler examination performed the same day.    Resolution of previously noted bilateral hydronephrosis.

## 2018-05-05 NOTE — PROGRESS NOTE ADULT - ASSESSMENT
86M PMHx Dementia, BPH/ubstroctive uropathy recent admission w/ MATTHEW and RLL PNA c/b extensive Rt Common femoral, deep femoral, external iliac and popliteal vein thrombosis and Lt deep common femoral veins. DVT source as likely provoked given compression of IVC from Bladder 2/2 obstructive uropathy however cannot rule out malignancy as pt was found to have RLL opacity concerning for infection vs. malignancy in the setting of an overall bed bound state.     - f/u GI workup for source of GIB. tentatively planned for EGD on Monday, 5/7  - would challenge with heparin infusion when ok per GI, likely after EGD. H/H not yet stable.  - would hold off on IVCF for now  - monitor H/H  - SQH for DVT ppx 86M PMHx Dementia, BPH/ubstroctive uropathy recent admission w/ MATTHEW and RLL PNA c/b extensive Rt Common femoral, deep femoral, external iliac and popliteal vein thrombosis and Lt deep common femoral veins. DVT source as likely provoked given compression of IVC from Bladder 2/2 obstructive uropathy however cannot rule out malignancy as pt was found to have RLL opacity concerning for infection vs. malignancy in the setting of an overall bed bound state.     - f/u GI workup for source of GIB. tentatively planned for EGD on Monday, 5/7  - would challenge with heparin infusion when ok per GI  - monitor H/H

## 2018-05-05 NOTE — DIETITIAN INITIAL EVALUATION ADULT. - NUTRITION INTERVENTION
Medical Food Supplements/Meals and Snack/Nutrition Education Vitamin/Meals and Snack/Nutrition Education/Medical Food Supplements

## 2018-05-05 NOTE — DIETITIAN INITIAL EVALUATION ADULT. - PHYSICAL APPEARANCE
Cachectic, pt with visual protruding clavicles, squared shoulders, temporal muscle wasting/emaciated

## 2018-05-05 NOTE — PROGRESS NOTE ADULT - SUBJECTIVE AND OBJECTIVE BOX
Patient is a 86y old  Male who presents with a chief complaint of 86M sent in from Rehab for "coffee ground emesis"x2 (04 May 2018 14:38)      SUBJECTIVE / OVERNIGHT EVENTS:    MEDICATIONS  (STANDING):  heparin  Injectable 5000 Unit(s) SubCutaneous every 8 hours  pantoprazole  Injectable 40 milliGRAM(s) IV Push two times a day    MEDICATIONS  (PRN):        CAPILLARY BLOOD GLUCOSE        I&O's Summary    04 May 2018 07:  -  05 May 2018 07:00  --------------------------------------------------------  IN: 0 mL / OUT: 500 mL / NET: -500 mL    05 May 2018 07:01  -  05 May 2018 14:38  --------------------------------------------------------  IN: 240 mL / OUT: 200 mL / NET: 40 mL        PHYSICAL EXAM:  GENERAL: NAD, well-developed  HEAD:  Atraumatic, Normocephalic  EYES: EOMI, PERRLA, conjunctiva and sclera clear  NECK: Supple, No JVD  CHEST/LUNG: Clear to auscultation bilaterally; No wheeze  HEART: Regular rate and rhythm; No murmurs, rubs, or gallops  ABDOMEN: Soft, Nontender, Nondistended; Bowel sounds present  EXTREMITIES:  2+ Peripheral Pulses, No clubbing, cyanosis, or edema  PSYCH: AAOx3  NEUROLOGY: non-focal  SKIN: No rashes or lesions    LABS:                        9.5    20.6  )-----------( 393      ( 05 May 2018 08:16 )             29.0     WBC Trend: 20.6<--, 22.4<--, 20.1<--  05-05    144  |  106  |  24<H>  ----------------------------<  86  3.8   |  28  |  1.02    Ca    8.1<L>      05 May 2018 07:34  Phos  2.1     05-05  Mg     1.9     05-    TPro  6.6  /  Alb  3.2<L>  /  TBili  0.5  /  DBili  x   /  AST  22  /  ALT  21  /  AlkPhos  111      Creatinine Trend: 1.02<--, 1.06<--, 0.90<--, 0.92<--, 0.98<--, 0.97<--  PT/INR - ( 04 May 2018 12:07 )   PT: 15.8 sec;   INR: 1.44 ratio         PTT - ( 04 May 2018 12:07 )  PTT:38.5 sec      Urinalysis Basic - ( 04 May 2018 13:13 )    Color: Yellow / Appearance: Clear / S.019 / pH: x  Gluc: x / Ketone: Negative  / Bili: Negative / Urobili: Negative   Blood: x / Protein: Trace / Nitrite: Negative   Leuk Esterase: Large / RBC: 5-10 /HPF / WBC >50 /HPF   Sq Epi: x / Non Sq Epi: OCC /HPF / Bacteria: Many /HPF          RADIOLOGY & ADDITIONAL TESTS:    Imaging Personally Reviewed:    Consultant(s) Notes Reviewed:      Care Discussed with Consultants/Other Providers: Covered by Bindu Rivas pager [phone], for team 2 medicine until 12PM on 2018      Patient is a 86y old  Male who presents with a chief complaint of 86M sent in from Rehab for "coffee ground emesis"x2 (04 May 2018 14:38)      SUBJECTIVE / OVERNIGHT EVENTS:  No acute events overnight. Patient denies CP, SOB, fevers or chills. He states that he has not been eating well.     MEDICATIONS  (STANDING):  heparin  Injectable 5000 Unit(s) SubCutaneous every 8 hours  pantoprazole  Injectable 40 milliGRAM(s) IV Push two times a day    MEDICATIONS  (PRN):  CAPILLARY BLOOD GLUCOSE    I&O's Summary    04 May 2018 07:01  -  05 May 2018 07:00  --------------------------------------------------------  IN: 0 mL / OUT: 500 mL / NET: -500 mL    05 May 2018 07:01  -  05 May 2018 14:38  --------------------------------------------------------  IN: 240 mL / OUT: 200 mL / NET: 40 mL        PHYSICAL EXAM:  GENERAL: NAD, well-developed, appears frail  HEAD:  Atraumatic, Normocephalic  EYES: EOMI, PERRLA, conjunctiva and sclera clear  NECK: Supple  CHEST/LUNG: Clear to auscultation bilaterally; No wheeze  HEART: Regular rate and rhythm; No murmurs, rubs, or gallops  ABDOMEN: Soft, Nontender, Nondistended; Bowel sounds present  EXTREMITIES:  2+ Peripheral Pulses, No clubbing, cyanosis, or edema  NEUROLOGY: non-focal, moves extremities voluntarily,  SKIN: No rashes or lesions    LABS:                        9.5    20.6  )-----------( 393      ( 05 May 2018 08:16 )             29.0     WBC Trend: 20.6<--, 22.4<--, 20.1<--      144  |  106  |  24<H>  ----------------------------<  86  3.8   |  28  |  1.02    Ca    8.1<L>      05 May 2018 07:34  Phos  2.1     05-  Mg     1.9     05    TPro  6.6  /  Alb  3.2<L>  /  TBili  0.5  /  DBili  x   /  AST  22  /  ALT  21  /  AlkPhos  111      Creatinine Trend: 1.02<--, 1.06<--, 0.90<--, 0.92<--, 0.98<--, 0.97<--  PT/INR - ( 04 May 2018 12:07 )   PT: 15.8 sec;   INR: 1.44 ratio         PTT - ( 04 May 2018 12:07 )  PTT:38.5 sec      Urinalysis Basic - ( 04 May 2018 13:13 )    Color: Yellow / Appearance: Clear / S.019 / pH: x  Gluc: x / Ketone: Negative  / Bili: Negative / Urobili: Negative   Blood: x / Protein: Trace / Nitrite: Negative   Leuk Esterase: Large / RBC: 5-10 /HPF / WBC >50 /HPF   Sq Epi: x / Non Sq Epi: OCC /HPF / Bacteria: Many /HPF

## 2018-05-05 NOTE — DIETITIAN INITIAL EVALUATION ADULT. - OTHER INFO
Pt seen for nutrition consult for cachexia from dementia. Pt reports currently fair appetite. Pt's current weight dosing weight 160lbs. Per previous RD note pt's weight was 137lbs (4/22/18). Per pt's wife she thinks pt's weight is currently about 150lbs, and reports a UBW fo 160-165lbs about 1 year ago. Pt visually appears closer to >150lbs. Pt's wife reports that pt has heavy bones and that his weight may be falsely high. Pt denies any chewing or swallowing difficulties, however pt is currently without his dentures, pt reports tolerating texture of diet well.  Pt reports some constipation, pt amendable to stewed prunes, denies any other GI distress. Pt with NKFA.

## 2018-05-05 NOTE — DIETITIAN INITIAL EVALUATION ADULT. - ADHERENCE
n/a/Pt does not follow any specific therapeutic diets PTA, however per wife, pt consumes an organic diet PTA.

## 2018-05-05 NOTE — PROGRESS NOTE ADULT - PROBLEM SELECTOR PLAN 1
Patient suspected to have upper GI bleed from elevated INR from warfarin dosing  -C/w trend CBC q12hr. Obtained consent to transfusion.  - s/p protonix 80mg and c/w protonix 40mg BID. s/p vitamin K and Kcentra.   - Transfusion goal hb >7.  Per GI, plan for EGD on MONDAY. Will keep NPO after midnight Sunday.

## 2018-05-05 NOTE — PROGRESS NOTE ADULT - ASSESSMENT
86M w/ hx of b/l LE DVT on warfarin, unspecified dementia (A&Ox1), BPH w/ hx of obstructive uropathy, inguinal hernia and recent admission for encephalopathy w/ RLL pnumonia c/b MATTHEW (d/c 5/1/18) presents to [facility] from Rehab with complaint of "coffee ground emesis x2" per Rehab RN and found to have elevated INR reversed by ED w/ suspected upper GI bleed 2/2 anticoagulation, with slowly downtrending Hgb without overt signs of bleed.

## 2018-05-05 NOTE — DIETITIAN INITIAL EVALUATION ADULT. - ORAL INTAKE PTA
Pt's wife reports fair appetite and PO intake PTA. Pt's typical food intake include: bananas with sour cream, sprouted bread, butter, Nature grains promise bread, soup, eggs. Pt's supplement use PTA includes "whole food vitamins /minerals": Vitamin C, B-complex, high goran E, fish oil, organic protein shake/fair

## 2018-05-05 NOTE — DIETITIAN INITIAL EVALUATION ADULT. - PROBLEM SELECTOR PLAN 9
face to face time spent 35 min discussing patients health w/ HCP. gave medical opinion that patient is at end of natural life and appears to be in dying process. Informed that resuscitation process would provide significant harm and pain and HCP aware but for now wants full code and all treatment modes offered.

## 2018-05-05 NOTE — DIETITIAN INITIAL EVALUATION ADULT. - NS AS NUTRI INTERV ED CONTENT
Pt and pt's wife made aware of pt's increased energy and protein needs. 1) reviewed small frequent meals with nutrient dense foods, 2) reviewed good sources of protein, 3) encouraged pt to continue consuming magic cup, 4) pt encouraged good protein and energy intake./Purpose of the nutrition education

## 2018-05-06 LAB
ANION GAP SERPL CALC-SCNC: 10 MMOL/L — SIGNIFICANT CHANGE UP (ref 5–17)
APTT BLD: 30.9 SEC — SIGNIFICANT CHANGE UP (ref 27.5–37.4)
BUN SERPL-MCNC: 22 MG/DL — SIGNIFICANT CHANGE UP (ref 7–23)
CALCIUM SERPL-MCNC: 8.8 MG/DL — SIGNIFICANT CHANGE UP (ref 8.4–10.5)
CHLORIDE SERPL-SCNC: 105 MMOL/L — SIGNIFICANT CHANGE UP (ref 96–108)
CO2 SERPL-SCNC: 30 MMOL/L — SIGNIFICANT CHANGE UP (ref 22–31)
CREAT SERPL-MCNC: 1.04 MG/DL — SIGNIFICANT CHANGE UP (ref 0.5–1.3)
GLUCOSE SERPL-MCNC: 110 MG/DL — HIGH (ref 70–99)
HCT VFR BLD CALC: 29.1 % — LOW (ref 39–50)
HCT VFR BLD CALC: 30.8 % — LOW (ref 39–50)
HGB BLD-MCNC: 10.3 G/DL — LOW (ref 13–17)
HGB BLD-MCNC: 9.6 G/DL — LOW (ref 13–17)
INR BLD: 1.15 RATIO — SIGNIFICANT CHANGE UP (ref 0.88–1.16)
MCHC RBC-ENTMCNC: 30.8 PG — SIGNIFICANT CHANGE UP (ref 27–34)
MCHC RBC-ENTMCNC: 31.3 PG — SIGNIFICANT CHANGE UP (ref 27–34)
MCHC RBC-ENTMCNC: 33.1 GM/DL — SIGNIFICANT CHANGE UP (ref 32–36)
MCHC RBC-ENTMCNC: 33.6 GM/DL — SIGNIFICANT CHANGE UP (ref 32–36)
MCV RBC AUTO: 93.2 FL — SIGNIFICANT CHANGE UP (ref 80–100)
MCV RBC AUTO: 93.3 FL — SIGNIFICANT CHANGE UP (ref 80–100)
PLATELET # BLD AUTO: 401 K/UL — HIGH (ref 150–400)
PLATELET # BLD AUTO: 439 K/UL — HIGH (ref 150–400)
POTASSIUM SERPL-MCNC: 4.3 MMOL/L — SIGNIFICANT CHANGE UP (ref 3.5–5.3)
POTASSIUM SERPL-SCNC: 4.3 MMOL/L — SIGNIFICANT CHANGE UP (ref 3.5–5.3)
PROTHROM AB SERPL-ACNC: 12.5 SEC — SIGNIFICANT CHANGE UP (ref 9.8–12.7)
RBC # BLD: 3.12 M/UL — LOW (ref 4.2–5.8)
RBC # BLD: 3.3 M/UL — LOW (ref 4.2–5.8)
RBC # FLD: 12.6 % — SIGNIFICANT CHANGE UP (ref 10.3–14.5)
RBC # FLD: 12.6 % — SIGNIFICANT CHANGE UP (ref 10.3–14.5)
SODIUM SERPL-SCNC: 145 MMOL/L — SIGNIFICANT CHANGE UP (ref 135–145)
WBC # BLD: 11 K/UL — HIGH (ref 3.8–10.5)
WBC # BLD: 14.6 K/UL — HIGH (ref 3.8–10.5)
WBC # FLD AUTO: 11 K/UL — HIGH (ref 3.8–10.5)
WBC # FLD AUTO: 14.6 K/UL — HIGH (ref 3.8–10.5)

## 2018-05-06 PROCEDURE — 99233 SBSQ HOSP IP/OBS HIGH 50: CPT | Mod: GC

## 2018-05-06 PROCEDURE — 74177 CT ABD & PELVIS W/CONTRAST: CPT | Mod: 26

## 2018-05-06 RX ADMIN — PANTOPRAZOLE SODIUM 40 MILLIGRAM(S): 20 TABLET, DELAYED RELEASE ORAL at 05:18

## 2018-05-06 RX ADMIN — HEPARIN SODIUM 5000 UNIT(S): 5000 INJECTION INTRAVENOUS; SUBCUTANEOUS at 05:18

## 2018-05-06 RX ADMIN — HEPARIN SODIUM 5000 UNIT(S): 5000 INJECTION INTRAVENOUS; SUBCUTANEOUS at 21:27

## 2018-05-06 RX ADMIN — HEPARIN SODIUM 5000 UNIT(S): 5000 INJECTION INTRAVENOUS; SUBCUTANEOUS at 13:11

## 2018-05-06 RX ADMIN — Medication 1 TABLET(S): at 11:31

## 2018-05-06 RX ADMIN — PANTOPRAZOLE SODIUM 40 MILLIGRAM(S): 20 TABLET, DELAYED RELEASE ORAL at 17:08

## 2018-05-06 RX ADMIN — Medication 1 TABLET(S): at 07:27

## 2018-05-06 NOTE — PROGRESS NOTE ADULT - PROBLEM SELECTOR PLAN 2
As above. possibility for GI bleed include PUD, exposed vessel vs malignancy.      - Likely precipitated from coumadin. HOLD AC.      - c/w protonix     - monitor w/ cbc.  - Plan for EGD tomorrow, as above

## 2018-05-06 NOTE — PROGRESS NOTE ADULT - SUBJECTIVE AND OBJECTIVE BOX
Patient is a 86y old  Male who presents with a chief complaint of 86M sent in from Rehab for "coffee ground emesis"x2 (04 May 2018 14:38)      Overnight Events: No acute events  Subjective: Demented patient, responses to questions occasionally inappropriate or unintelligible. No f/c, no n/v. No CP, abd pain, SOB. No additional concerns.      PHYSICAL EXAM:  Vitals: T(F): 98.1  HR: 78  BP: 116/65  RR: 18  SpO2: 95% on RA  GENERAL: NAD, Cachectic  HEAD:  Atraumatic, Normocephalic, poor dentition  EYES: EOMI, PERRLA, conjunctiva and sclera clear  NECK: Supple  CHEST/LUNG: Clear to auscultation bilaterally; No wheeze  HEART: Regular rate and rhythm; No murmurs, rubs, or gallops  ABDOMEN: Very thin, non-distended, inguinal hernia appreciated on the left. No tenderness  EXTREMITIES:  2+ Peripheral Pulses, No clubbing, cyanosis, or edema  NEUROLOGY: non-focal  SKIN: No rashes or lesions    LABS:  CAPILLARY BLOOD GLUCOSE        I&O's Summary    05 May 2018 07:01  -  06 May 2018 07:00  --------------------------------------------------------  IN: 600 mL / OUT: 600 mL / NET: 0 mL                              9.6    14.6  )-----------( 401      ( 06 May 2018 03:19 )             29.1     WBC Trend: 14.6<--, 21.3<--, 20.6<--  05    145  |  105  |  22  ----------------------------<  110<H>  4.3   |  30  |  1.04    Ca    8.8      06 May 2018 03:19  Phos  2.1     05-05  Mg     1.9     05-05    TPro  6.6  /  Alb  3.2<L>  /  TBili  0.5  /  DBili  x   /  AST  22  /  ALT  21  /  AlkPhos  111  05-04    Creatinine Trend: 1.04<--, 1.02<--, 1.06<--, 0.90<--, 0.92<--, 0.98<--  PT/INR - ( 06 May 2018 03:34 )   PT: 12.5 sec;   INR: 1.15 ratio         PTT - ( 06 May 2018 03:34 )  PTT:30.9 sec      Urinalysis Basic - ( 04 May 2018 13:13 )    Color: Yellow / Appearance: Clear / S.019 / pH: x  Gluc: x / Ketone: Negative  / Bili: Negative / Urobili: Negative   Blood: x / Protein: Trace / Nitrite: Negative   Leuk Esterase: Large / RBC: 5-10 /HPF / WBC >50 /HPF   Sq Epi: x / Non Sq Epi: OCC /HPF / Bacteria: Many /HPF            MEDICATIONS  (STANDING):  heparin  Injectable 5000 Unit(s) SubCutaneous every 8 hours  pantoprazole  Injectable 40 milliGRAM(s) IV Push two times a day  potassium acid phosphate/sodium acid phosphate tablet (K-PHOS No. 2) 1 Tablet(s) Oral four times a day with meals    MEDICATIONS  (PRN):

## 2018-05-06 NOTE — CHART NOTE - NSCHARTNOTEFT_GEN_A_CORE
Called to bedside at 5:30 PM for concern of new onset abdominal pain. Patient was seen eating by provider 2 hours prior to episode. Patient wife concerned about pain complaint. No nausea/vomiting, tolerating diet, bowel movement this AM. Patient indicates location of pain as immediately superior to known left inguinal hernia. Seen and examined with senior resident (Dr. Bria Weiss). There is mild tenderness to palpation over the inguinal hernia but no overlying erythema, swelling. Appearance unchanged from exam this AM. Discussed with nursing staff (unchanged from pervious days). The remainder of the abdomen in non-tender, and without notable masses.     Abdominal exam unlikely represents an acute abdominal process (tenderness is local and mild). However, given new complaint of tenderness, will order CT abd/pelv with contrast to rule-out new strangulation. If positive, will consider surgical consult.    Todd Barcenas MD PhD  d/w Bria Weiss MD

## 2018-05-06 NOTE — CHART NOTE - NSCHARTNOTEFT_GEN_A_CORE
GI CHART NOTE    Chart and labs reviewed.     Hgb stable at 9.6 from 10.3 prior. Leukocytosis improving, 14.6 this morning.     Plan for EGD tomorrow.    Please keep patient NPO past midnight (order placed by GI).

## 2018-05-06 NOTE — PROGRESS NOTE ADULT - PROBLEM SELECTOR PLAN 3
Noted to have supra-therapeutic INR w/ warfarin  - reversed by ED. monitor INR daily. HOLD AC given bleed Noted to have supra-therapeutic INR w/ warfarin  - reversed by ED. monitor INR daily. HOLD AC given bleed.   - Would consider heparin and bridge to coumadin if EGD negative, pending GI recs

## 2018-05-06 NOTE — PROGRESS NOTE ADULT - PROBLEM SELECTOR PLAN 5
Unspecified dementia  - folate, tsh, b12 normal on last admit  - suspect age-related  -A&Ox1. has exceptional cachexia. Appears to be actively dying with weeks to months. informed HCP. would benefit from further GOC and hospice given degree of advanced illness Unspecified dementia  - folate, tsh, b12 normal on last admit  - suspect age-related  -A&Ox1. has exceptional cachexia. Likely needs GOC discussion re: Hospice

## 2018-05-06 NOTE — PROGRESS NOTE ADULT - ASSESSMENT
86M w/ hx of b/l LE DVT on warfarin, unspecified dementia (A&Ox1), BPH w/ hx of obstructive uropathy, inguinal hernia and recent admission for encephalopathy w/ RLL pnumonia c/b MATTHEW (d/c 5/1/18) presents to [facility] from Rehab with complaint of "coffee ground emesis x2" and found to have elevated INR reversed by ED w/ suspected upper GI bleed 2/2 anticoagulation, with slowly downtrending Hgb without overt signs of bleed.

## 2018-05-06 NOTE — PROGRESS NOTE ADULT - PROBLEM SELECTOR PLAN 1
Patient suspected to have upper GI bleed from elevated INR from warfarin dosing     - INR 1.15  - c/w trend CBC q12hr. Obtained consent to transfusion.  - c/w protonix 40mg BID.        - s/p protonix 80mg       - s/p vitamin K and Kcentra.   - Transfusion goal hb >7.  - Plan for EGD tomorrow, NPO after midnight Patient suspected to have upper GI bleed from elevated INR from warfarin dosing     - INR 1.15  - c/w trend CBC q8hr. Obtained consent to transfusion.  - c/w protonix 40mg BID.        - s/p protonix 80mg       - s/p vitamin K and Kcentra.   - Transfusion goal hb >7.  - Plan for EGD tomorrow, NPO after midnight

## 2018-05-07 LAB
ANION GAP SERPL CALC-SCNC: 9 MMOL/L — SIGNIFICANT CHANGE UP (ref 5–17)
BUN SERPL-MCNC: 18 MG/DL — SIGNIFICANT CHANGE UP (ref 7–23)
CALCIUM SERPL-MCNC: 8.5 MG/DL — SIGNIFICANT CHANGE UP (ref 8.4–10.5)
CHLORIDE SERPL-SCNC: 107 MMOL/L — SIGNIFICANT CHANGE UP (ref 96–108)
CO2 SERPL-SCNC: 29 MMOL/L — SIGNIFICANT CHANGE UP (ref 22–31)
CREAT SERPL-MCNC: 0.97 MG/DL — SIGNIFICANT CHANGE UP (ref 0.5–1.3)
GLUCOSE SERPL-MCNC: 109 MG/DL — HIGH (ref 70–99)
HCT VFR BLD CALC: 29.8 % — LOW (ref 39–50)
HCT VFR BLD CALC: 29.9 % — LOW (ref 39–50)
HGB BLD-MCNC: 10 G/DL — LOW (ref 13–17)
HGB BLD-MCNC: 9.9 G/DL — LOW (ref 13–17)
MAGNESIUM SERPL-MCNC: 2.1 MG/DL — SIGNIFICANT CHANGE UP (ref 1.6–2.6)
MCHC RBC-ENTMCNC: 31 PG — SIGNIFICANT CHANGE UP (ref 27–34)
MCHC RBC-ENTMCNC: 31.2 PG — SIGNIFICANT CHANGE UP (ref 27–34)
MCHC RBC-ENTMCNC: 33.3 GM/DL — SIGNIFICANT CHANGE UP (ref 32–36)
MCHC RBC-ENTMCNC: 33.5 GM/DL — SIGNIFICANT CHANGE UP (ref 32–36)
MCV RBC AUTO: 93.2 FL — SIGNIFICANT CHANGE UP (ref 80–100)
MCV RBC AUTO: 93.2 FL — SIGNIFICANT CHANGE UP (ref 80–100)
PHOSPHATE SERPL-MCNC: 2.5 MG/DL — SIGNIFICANT CHANGE UP (ref 2.5–4.5)
PLATELET # BLD AUTO: 381 K/UL — SIGNIFICANT CHANGE UP (ref 150–400)
PLATELET # BLD AUTO: 412 K/UL — HIGH (ref 150–400)
POTASSIUM SERPL-MCNC: 4.3 MMOL/L — SIGNIFICANT CHANGE UP (ref 3.5–5.3)
POTASSIUM SERPL-SCNC: 4.3 MMOL/L — SIGNIFICANT CHANGE UP (ref 3.5–5.3)
RBC # BLD: 3.2 M/UL — LOW (ref 4.2–5.8)
RBC # BLD: 3.21 M/UL — LOW (ref 4.2–5.8)
RBC # FLD: 12.5 % — SIGNIFICANT CHANGE UP (ref 10.3–14.5)
RBC # FLD: 12.5 % — SIGNIFICANT CHANGE UP (ref 10.3–14.5)
SODIUM SERPL-SCNC: 145 MMOL/L — SIGNIFICANT CHANGE UP (ref 135–145)
WBC # BLD: 6.8 K/UL — SIGNIFICANT CHANGE UP (ref 3.8–10.5)
WBC # BLD: 8.3 K/UL — SIGNIFICANT CHANGE UP (ref 3.8–10.5)
WBC # FLD AUTO: 6.8 K/UL — SIGNIFICANT CHANGE UP (ref 3.8–10.5)
WBC # FLD AUTO: 8.3 K/UL — SIGNIFICANT CHANGE UP (ref 3.8–10.5)

## 2018-05-07 PROCEDURE — 99233 SBSQ HOSP IP/OBS HIGH 50: CPT | Mod: GC

## 2018-05-07 RX ADMIN — PANTOPRAZOLE SODIUM 40 MILLIGRAM(S): 20 TABLET, DELAYED RELEASE ORAL at 05:34

## 2018-05-07 RX ADMIN — HEPARIN SODIUM 5000 UNIT(S): 5000 INJECTION INTRAVENOUS; SUBCUTANEOUS at 21:42

## 2018-05-07 RX ADMIN — PANTOPRAZOLE SODIUM 40 MILLIGRAM(S): 20 TABLET, DELAYED RELEASE ORAL at 17:33

## 2018-05-07 RX ADMIN — HEPARIN SODIUM 5000 UNIT(S): 5000 INJECTION INTRAVENOUS; SUBCUTANEOUS at 14:30

## 2018-05-07 RX ADMIN — HEPARIN SODIUM 5000 UNIT(S): 5000 INJECTION INTRAVENOUS; SUBCUTANEOUS at 05:34

## 2018-05-07 NOTE — PROGRESS NOTE ADULT - PROBLEM SELECTOR PLAN 10
scd for dvt ppx  Diet: CLD, NPO after MN  Susan Palma PGY-1  Spectre [phone]  Pager # [phone]/ [phone]

## 2018-05-07 NOTE — CONSULT NOTE ADULT - SUBJECTIVE AND OBJECTIVE BOX
CC: Patient is a 86y old  Male who presents with a chief complaint of 86M sent in from Rehab for "coffee ground emesis"x2. Patient had abdominal pain today.     HPI: 86M w/ hx of b/l LE DVT on warfarin, unspecified dementia (A&Ox1), BPH w/ hx of obstructive uropathy, inguinal hernia and recent admission for encephalopathy w/ RLL pnumonia c/b MATTHEW (d/c 5/1/18) presents to [facility] from Rehab with complaint of "coffee ground emesis x2" per Rehab RN. Hx collected from Rehab RN(Elham) and from HCP Nicole Tang. Per Rehab Rn, am signout was that the patient had "coffee ground" emesis x2 in the setting of elevated INR of 3.06. RN additionally reported that warfarin dose was held last night and VS of the patient at that time were afebrile, 130/20, 88 and on RA. In discussion with the patient's HCP via telephone, she reports that the patient had complaint of abdominal pain for the last 2d and is frustrated that the rehab did not send the patient for evaluation and the hospital sooner. Patient per chart was treated for RLL pneumonia and new b/l LE DVT (Right femoral/deep femoral/common femoral/external iliac and left deep femoral and common femoral) and was evaluated for encephalopathy from uremia however appeared to have progressive dementia. Unable to obtain other history given that the patient is A&Ox1 and is not reliable. However, patient did endorse abdominal pain today, CT scan showed SBO at L inguinal hernia.     On exam, patient is alert, interactive, oriented times one, no abdominal pain, large left inguinal hernia was reduced at bedside. no tenderness at abdomen.     PMH  Inguinal hernia  BPH (benign prostatic hyperplasia)  Dementia  DVT (deep venous thrombosis)  No pertinent past medical history    PSH  No significant past surgical history    MEDS  Home Medications:  tamsulosin 0.4 mg oral capsule: 1 cap(s) orally once a day (at bedtime) (01 May 2018 16:21)  warfarin 3 mg oral tablet: 1 tab(s) orally once a day atleast 3 months (01 May 2018 16:21)    Allergies    No Known Allergies      Physical Exam  T(C): 36.8 (05-07-18 @ 04:12), Max: 36.9 (05-07-18 @ 01:30)  HR: 88 (05-07-18 @ 04:12) (78 - 93)  BP: 142/75 (05-07-18 @ 04:12) (116/65 - 154/73)  RR: 18 (05-07-18 @ 04:12) (18 - 18)  SpO2: 96% (05-07-18 @ 04:12) (95% - 98%)  Tmax: T(C): , Max: 36.9 (05-07-18 @ 01:30)      Gen: NAD, alert and interactive, oriented time one  HEENT: normocephalic, atraumatic, no scleral icterus  CV: S1, S2, RRR  Pulm: CTA B/L  Abd: Soft, ND, NTP, no rebound, no guarding, no palpable organomegaly/masses. Large Left inguinal hernia noted.   Ext: warm, no edema, palp dp/pt  Skin: no rash, no ecchymoses  Neuro: no focal deficits, CN grossly normal.     05-05-18  -  05-06-18  --------------------------------------------------------  IN:    Oral Fluid: 600 mL  Total IN: 600 mL    OUT:    Indwelling Catheter - Urethral: 825 mL  Total OUT: 825 mL    Total NET: -225 mL      05-06-18  -  05-07-18  --------------------------------------------------------  IN:    Oral Fluid: 900 mL  Total IN: 900 mL    OUT:    Indwelling Catheter - Urethral: 600 mL  Total OUT: 600 mL    Total NET: 300 mL    Labs:                        10.3   11.0  )-----------( 439      ( 06 May 2018 15:57 )             30.8     05-06    145  |  105  |  22  ----------------------------<  110<H>  4.3   |  30  |  1.04    Ca    8.8      06 May 2018 03:19  Phos  2.1     05-05  Mg     1.9     05-05      PT/INR - ( 06 May 2018 03:34 )   PT: 12.5 sec;   INR: 1.15 ratio         PTT - ( 06 May 2018 03:34 )  PTT:30.9 sec    Imaging  IMPRESSION:    Compared to the previous CT of May 4, 2018, there has been interval   development of a small bowel obstruction with a transition point   involving the patient's known left indirect inguinal hernia. The   herniated small bowel demonstrates wall thickening and hyperenhancement   with interloop fluid concerning for incarceration/ischemia. The colon   which has also herniated through this hernia is nonobstructed.

## 2018-05-07 NOTE — CONSULT NOTE ADULT - ASSESSMENT
86M w/ hx of b/l LE DVT on warfarin, unspecified dementia (A&Ox1), BPH w/ hx of obstructive uropathy, inguinal hernia and recent admission for encephalopathy w/ RLL pnumonia c/b MATTHEW (d/c 5/1/18) presents to [facility] from Rehab with complaint of "coffee ground emesis x2" per Rehab RN and found to have elevated INR reversed by ED w/ suspected upper GI bleed 2/2 anticoagulation, Patient had abdominal pain today, CT suggested SBO at L inguinal Hernia  - Medical management per primary team  - Inguinal hernia was reduced at bedside  - patient has no abdominal pain  - continue planned Upper GI today  - scrotum physical support is recommended.   - no urgent surgical intervention required   - Patient is fairly deconditioned, benefit vs risk of hernia repair surgery has to be considered.   - d/w Dr. Teixeira

## 2018-05-07 NOTE — PROGRESS NOTE ADULT - PROBLEM SELECTOR PLAN 5
Unspecified dementia  - folate, tsh, b12 normal on last admit  - suspect age-related  -A&Ox1. has exceptional cachexia. Likely needs GOC discussion re: Hospice

## 2018-05-07 NOTE — PROGRESS NOTE ADULT - SUBJECTIVE AND OBJECTIVE BOX
Patient is a 86y old  Male who presents with a chief complaint of 86M sent in from Rehab for "coffee ground emesis"x2 (04 May 2018 14:38)      SUBJECTIVE / OVERNIGHT EVENTS: Unable to undergo upper endoscopy as patient has large bowel obstruction. Will keep NPO for now given obstruction.    MEDICATIONS  (STANDING):  heparin  Injectable 5000 Unit(s) SubCutaneous every 8 hours  pantoprazole  Injectable 40 milliGRAM(s) IV Push two times a day        I&O's Summary    06 May 2018 07:01  -  07 May 2018 07:00  --------------------------------------------------------  IN: 900 mL / OUT: 1000 mL / NET: -100 mL    07 May 2018 07:01  -  07 May 2018 12:22  --------------------------------------------------------  IN: 0 mL / OUT: 200 mL / NET: -200 mL    PHYSICAL EXAM:  GENERAL: NAD, appears frail  HEAD:  Atraumatic, Normocephalic  EYES: EOMI, PERRLA, conjunctiva and sclera clear  NECK: Supple  CHEST/LUNG: Clear to auscultation bilaterally; No wheeze  HEART: Regular rate and rhythm; No murmurs, rubs, or gallops  ABDOMEN: Noted large L. inguinal hernia, with hyperactive BS, otherwise Soft, Nontender, Nondistended;   EXTREMITIES:  2+ Peripheral Pulses, No clubbing, cyanosis, or edema  NEUROLOGY: AAO x 1 to name, non-focal, moves extremities voluntarily,  SKIN: No rashes or lesions    LABS:                        10.0   8.3   )-----------( 381      ( 07 May 2018 04:56 )             29.9     05-07    145  |  107  |  18  ----------------------------<  109<H>  4.3   |  29  |  0.97    Ca    8.5      07 May 2018 04:56  Phos  2.5     05-07  Mg     2.1     05-07      PT/INR - ( 06 May 2018 03:34 )   PT: 12.5 sec;   INR: 1.15 ratio         PTT - ( 06 May 2018 03:34 )  PTT:30.9 sec          RADIOLOGY & ADDITIONAL TESTS:    Imaging Personally Reviewed:    Consultant(s) Notes Reviewed:  Surgery, Cardiiology    Care Discussed with Consultants/Other Providers: Discussed with GI, fellow will discuss with attending if heparin challenge is appropriate or they want to scope him prior to starting it. Patient is a 86y old  Male who presents with a chief complaint of 86M sent in from Rehab for "coffee ground emesis"x2 (04 May 2018 14:38)      SUBJECTIVE / OVERNIGHT EVENTS: Unable to undergo upper endoscopy as patient has large bowel obstruction. Will keep NPO for now given obstruction.    MEDICATIONS  (STANDING):  heparin  Injectable 5000 Unit(s) SubCutaneous every 8 hours  pantoprazole  Injectable 40 milliGRAM(s) IV Push two times a day        I&O's Summary    06 May 2018 07:01  -  07 May 2018 07:00  --------------------------------------------------------  IN: 900 mL / OUT: 1000 mL / NET: -100 mL    07 May 2018 07:01  -  07 May 2018 12:22  --------------------------------------------------------  IN: 0 mL / OUT: 200 mL / NET: -200 mL    PHYSICAL EXAM:  GENERAL: NAD, appears frail  HEAD:  Atraumatic, Normocephalic  EYES: EOMI, PERRLA, conjunctiva and sclera clear  ENT: Poor dentition.  NECK: Supple  CHEST/LUNG: Clear to auscultation bilaterally; No wheeze  HEART: Regular rate and rhythm; No murmurs, rubs, or gallops  ABDOMEN: Noted large L. inguinal hernia, with hyperactive BS, otherwise Soft, Nontender, Nondistended;   EXTREMITIES:  2+ Peripheral Pulses, No clubbing, cyanosis, or edema  NEUROLOGY: AAO x 1 to name, non-focal, moves extremities voluntarily,  SKIN: No rashes or lesions    LABS:                        10.0   8.3   )-----------( 381      ( 07 May 2018 04:56 )             29.9     05-07    145  |  107  |  18  ----------------------------<  109<H>  4.3   |  29  |  0.97    Ca    8.5      07 May 2018 04:56  Phos  2.5     05-07  Mg     2.1     05-07      PT/INR - ( 06 May 2018 03:34 )   PT: 12.5 sec;   INR: 1.15 ratio         PTT - ( 06 May 2018 03:34 )  PTT:30.9 sec          RADIOLOGY & ADDITIONAL TESTS:    Imaging Personally Reviewed:    Consultant(s) Notes Reviewed:  Surgery, Cardiiology    Care Discussed with Consultants/Other Providers: Discussed with GI, fellow will discuss with attending if heparin challenge is appropriate or they want to scope him prior to starting it.

## 2018-05-07 NOTE — PROGRESS NOTE ADULT - ASSESSMENT
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

## 2018-05-07 NOTE — PROGRESS NOTE ADULT - PROBLEM SELECTOR PLAN 3
Noted to have supra-therapeutic INR w/ warfarin  - reversed by ED. monitor INR daily. HOLD AC given bleed.   - pending GI recs on heparin gtt

## 2018-05-07 NOTE — PROGRESS NOTE ADULT - ASSESSMENT
86M PMHx Dementia, BPH/ubstroctive uropathy recent admission w/ MATTHEW and RLL PNA c/b extensive Rt Common femoral, deep femoral, external iliac and popliteal vein thrombosis and Lt deep common femoral veins. DVT source as likely provoked given compression of IVC from Bladder 2/2 obstructive uropathy however cannot rule out malignancy as pt was found to have RLL opacity concerning for infection vs. malignancy in the setting of an overall bed bound state.     - f/u GI workup for source of GIB  - will f/u EGD  - monitor H&H  - consider IVC filter  -- when cleared by GI, reintroduce heparin    Hong Johnson MD

## 2018-05-07 NOTE — PROGRESS NOTE ADULT - PROBLEM SELECTOR PLAN 2
As above. possibility for GI bleed include PUD, exposed vessel vs malignancy.      - Likely precipitated from coumadin. HOLD AC.      - c/w protonix     - monitor w/ cbc.  - Plan as above

## 2018-05-07 NOTE — PROGRESS NOTE ADULT - PROBLEM SELECTOR PLAN 1
Patient suspected to have upper GI bleed from elevated INR from warfarin dosing     - INR 1.15  - c/w trend CBC q12hr. Obtained consent to transfusion.  - c/w protonix 40mg BID.        - s/p protonix 80mg       - s/p vitamin K and Kcentra.   - Transfusion goal hb >7.  - Unable to perform EGD as patient with new large bowel obstruction, GI will evaluate if can resume heparin gtt for DVT or await to relieve obstruction, perform EGD then resume heparin gtt

## 2018-05-08 ENCOUNTER — RESULT REVIEW (OUTPATIENT)
Age: 83
End: 2018-05-08

## 2018-05-08 DIAGNOSIS — Z51.5 ENCOUNTER FOR PALLIATIVE CARE: ICD-10-CM

## 2018-05-08 DIAGNOSIS — R53.81 OTHER MALAISE: ICD-10-CM

## 2018-05-08 LAB
BLD GP AB SCN SERPL QL: NEGATIVE — SIGNIFICANT CHANGE UP
HCT VFR BLD CALC: 30.4 % — LOW (ref 39–50)
HGB BLD-MCNC: 10 G/DL — LOW (ref 13–17)
MCHC RBC-ENTMCNC: 30.7 PG — SIGNIFICANT CHANGE UP (ref 27–34)
MCHC RBC-ENTMCNC: 32.8 GM/DL — SIGNIFICANT CHANGE UP (ref 32–36)
MCV RBC AUTO: 93.4 FL — SIGNIFICANT CHANGE UP (ref 80–100)
PLATELET # BLD AUTO: 412 K/UL — HIGH (ref 150–400)
RBC # BLD: 3.25 M/UL — LOW (ref 4.2–5.8)
RBC # FLD: 12.5 % — SIGNIFICANT CHANGE UP (ref 10.3–14.5)
RH IG SCN BLD-IMP: NEGATIVE — SIGNIFICANT CHANGE UP
WBC # BLD: 6.4 K/UL — SIGNIFICANT CHANGE UP (ref 3.8–10.5)
WBC # FLD AUTO: 6.4 K/UL — SIGNIFICANT CHANGE UP (ref 3.8–10.5)

## 2018-05-08 PROCEDURE — 88342 IMHCHEM/IMCYTCHM 1ST ANTB: CPT | Mod: 26

## 2018-05-08 PROCEDURE — 99233 SBSQ HOSP IP/OBS HIGH 50: CPT | Mod: GC

## 2018-05-08 PROCEDURE — 88305 TISSUE EXAM BY PATHOLOGIST: CPT | Mod: 26

## 2018-05-08 PROCEDURE — 88312 SPECIAL STAINS GROUP 1: CPT | Mod: 26

## 2018-05-08 RX ORDER — PANTOPRAZOLE SODIUM 20 MG/1
40 TABLET, DELAYED RELEASE ORAL
Qty: 0 | Refills: 0 | Status: DISCONTINUED | OUTPATIENT
Start: 2018-05-08 | End: 2018-05-11

## 2018-05-08 RX ADMIN — PANTOPRAZOLE SODIUM 40 MILLIGRAM(S): 20 TABLET, DELAYED RELEASE ORAL at 05:28

## 2018-05-08 RX ADMIN — HEPARIN SODIUM 5000 UNIT(S): 5000 INJECTION INTRAVENOUS; SUBCUTANEOUS at 05:28

## 2018-05-08 RX ADMIN — HEPARIN SODIUM 5000 UNIT(S): 5000 INJECTION INTRAVENOUS; SUBCUTANEOUS at 21:15

## 2018-05-08 RX ADMIN — PANTOPRAZOLE SODIUM 40 MILLIGRAM(S): 20 TABLET, DELAYED RELEASE ORAL at 20:04

## 2018-05-08 NOTE — PROGRESS NOTE ADULT - PROBLEM SELECTOR PLAN 1
Patient suspected to have upper GI bleed from elevated INR from warfarin dosing     - INR 1.15  - c/w trend CBC q12hr. Obtained consent to transfusion.  - c/w protonix 40mg BID.        - s/p protonix 80mg       - s/p vitamin K and Kcentra.   - Transfusion goal hb >7.  - Unable to perform EGD as patient with new large bowel obstruction, GI will evaluate if can resume heparin gtt for DVT or await to relieve obstruction, perform EGD today then resume heparin gtt Patient suspected to have upper GI bleed from elevated INR from warfarin dosing     - INR 1.15  -can trend CBC q24 hours  . Obtained consent to transfusion.  - c/w protonix 40mg BID.        - s/p protonix 80mg       - s/p vitamin K and Kcentra.   - Transfusion goal hb >7.  - Unable to perform EGD as patient with new large bowel obstruction, GI will evaluate if can resume heparin gtt for DVT or await to relieve obstruction, perform EGD today then resume heparin gtt

## 2018-05-08 NOTE — PROGRESS NOTE ADULT - ASSESSMENT
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

## 2018-05-08 NOTE — CONSULT NOTE ADULT - PROBLEM SELECTOR RECOMMENDATION 4
Discussion had with patient who was not willing to talk to provider and spoke with patient HCP. She understands the patients overall medical condition and stated that with past discussions with him, he would have wanted to stay alive and be revived. After further explorations about what that meant she did mention that he would not want to live being dependent on a machine. It was explained to her that because she is the Health care proxy, if the patient cannot make these decisions, it is her responsibility to make the decisions for him. She was thankful for the time, and stated that she will be present on 5/9/18 in the late afternoon, she was thankful for the time spent with her on the phone.

## 2018-05-08 NOTE — CONSULT NOTE ADULT - PROBLEM SELECTOR RECOMMENDATION 9
Patient with documented dementia, though as per HCP, he is usually in his good state of mind, up until he fell about 3 weeks ago. Know he is intermiitently confused . Continue care as per primary team. Patient with documented dementia, though as per HCP, he is usually in his good state of mind, up until he fell about 3 weeks ago. Know he is intermittently confused . Continue care as per primary team.

## 2018-05-08 NOTE — CONSULT NOTE ADULT - ATTENDING COMMENTS
87 yo male with dementia, verbal answering questions appropriately, dvt on coumadin,s/p self  limited upper GI bleed, INR increased. abdominal distention and increase wbc.    PLan: IV PPI BID           monitor h/h           recommend ct scan abdomen and pelvis
elderly, frail, poor baseline functional status  would be high risk for surgery  the hernia is reducible and the abdomen is benign  Continue to monitor for now and further assess risks benefits of operative intervention
Unable to see patient today, as he went for EGD. Case reviewed with NP Franco Godfrey, case management note reviewed. Will evaluate patient tomorrow, and plan to meet with wife to further discuss GOC.

## 2018-05-08 NOTE — CONSULT NOTE ADULT - PROBLEM SELECTOR RECOMMENDATION 2
Patient with known upper GI bleed, noticed by coffee ground emesis prior to arrival. Patient pending Upper endoscopy for further evaluation. Appreciate GI involvmenet Patient with known upper GI bleed, noticed by coffee ground emesis prior to arrival. Patient pending Upper endoscopy for further evaluation. Appreciate GI involvement Patient with known upper GI bleed, noticed by coffee ground emesis prior to arrival. Patient pending upper endoscopy for further evaluation. Appreciate GI involvement

## 2018-05-08 NOTE — CHART NOTE - NSCHARTNOTEFT_GEN_A_CORE
Malnutrition follow up. Interim note. Admitted with GI bleed. NPO for EGD today  Source: Patient [ ]    Family [ ]     other [ ]    Diet :   NPO        Current Weight: Weight (kg): 72.6 (05-08 @ 13:49)  % Weight Change    Pertinent Medications: MEDICATIONS  (STANDING):  heparin  Injectable 5000 Unit(s) SubCutaneous every 8 hours  pantoprazole  Injectable 40 milliGRAM(s) IV Push two times a day    MEDICATIONS  (PRN):    Pertinent Labs:  05-07 Na145 mmol/L Glu 109 mg/dL<H> K+ 4.3 mmol/L Cr  0.97 mg/dL BUN 18 mg/dL 05-07 Phos 2.5 mg/dL 05-04 Alb 3.2 g/dL<L>      Skin:     Estimated Needs:   [ ] no change since previous assessment  [ ] recalculated:       Previous Nutrition Diagnosis: t x ] Malnutrition          Nutrition Diagnosis is [x ] ongoing  [ ] resolved [ ] not applicable            Recommend    [ ] Change Diet To:    [x ] Nutrition Supplement once diet advances to clears, add ensure clears 3x/day;   further diet advancement per GI team    [ ] Nutrition Support    [ ] Other:        Monitoring and Evaluation:     [ ] PO intake [ ] Tolerance to diet prescription [ ] weights [ ] follow up per protocol    [ ] other: Malnutrition follow up. Interim note. Admitted with GI bleed. 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 Noted also small bowel obstruction. Seen by palliative care    Source:medical record/team    Diet :   NPO        Current Weight: Weight (kg): 72.6 (05-08 @ 13:49)  % Weight Change    Pertinent Medications: MEDICATIONS  (STANDING):  heparin  Injectable 5000 Unit(s) SubCutaneous every 8 hours  pantoprazole  Injectable 40 milliGRAM(s) IV Push two times a day    MEDICATIONS  (PRN):    Pertinent Labs:  05-07 Na145 mmol/L Glu 109 mg/dL<H> K+ 4.3 mmol/L Cr  0.97 mg/dL BUN 18 mg/dL 05-07 Phos 2.5 mg/dL 05-04 Alb 3.2 g/dL<L>      Estimated Needs:   [ x] no change since previous assessment  [ ] recalculated:       Previous Nutrition Diagnosis: t x ] Malnutrition          Nutrition Diagnosis is [x ] ongoing  [ ] resolved [ ] not applicable            Recommend    [ ] Change Diet To:    [x ] Nutrition Supplement once diet advances to clears, add ensure clears 3x/day;   further diet advancement per GI team    [ ] Nutrition Support    [ ] Other:        Monitoring and Evaluation:     [ ] PO intake [ ] Tolerance to diet prescription [ ] weights [ ] follow up per protocol    [ ] other:

## 2018-05-08 NOTE — PROGRESS NOTE ADULT - SUBJECTIVE AND OBJECTIVE BOX
Acute Care Surgery Progress Note     S: No events overnight. As per nursing patient with regular bowel movement this am. Resting comfortably this am.     MEDICATIONS  (STANDING):  heparin  Injectable 5000 Unit(s) SubCutaneous every 8 hours  pantoprazole  Injectable 40 milliGRAM(s) IV Push two times a day    MEDICATIONS  (PRN):      Physical Exam:    Vital Signs Last 24 Hrs  T(C): 36.7 (08 May 2018 08:50), Max: 37.1 (07 May 2018 19:48)  T(F): 98.1 (08 May 2018 08:50), Max: 98.8 (07 May 2018 19:48)  HR: 86 (08 May 2018 08:50) (80 - 86)  BP: 150/84 (08 May 2018 08:50) (140/76 - 150/84)  BP(mean): --  RR: 18 (08 May 2018 08:50) (18 - 18)  SpO2: 97% (08 May 2018 08:50) (96% - 97%)    05-07-18 @ 07:01  -  05-08-18 @ 07:00  --------------------------------------------------------  IN: 240 mL / OUT: 800 mL / NET: -560 mL        Gen: NAD, responsive to voice  Abdominal: Soft, minimally distended, non-tender, left groin soft, reducible hernia     LABS:                        10.0   6.4   )-----------( 412      ( 08 May 2018 08:57 )             30.4     05-07    145  |  107  |  18  ----------------------------<  109<H>  4.3   |  29  |  0.97    Ca    8.5      07 May 2018 04:56  Phos  2.5     05-07  Mg     2.1     05-07

## 2018-05-08 NOTE — PROGRESS NOTE ADULT - ASSESSMENT
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

## 2018-05-08 NOTE — PROGRESS NOTE ADULT - PROBLEM SELECTOR PLAN 10
scd for dvt ppx  Diet: CLD, NPO after MN  Susan Palma PGY-1  [facility] [id number]  Pager # [phone]/ [phone]

## 2018-05-08 NOTE — GOALS OF CARE CONVERSATION - PERSONAL ADVANCE DIRECTIVE - CONVERSATION DETAILS
Referred to palliative for assistance clarifying decision making for further goals of care discussion.  Patient discussed in multidisciplinary team rounds this morning.  LCSW spoke with Dr. Pham prior to meeting with patient and  after mtg with patient.     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 Hx collected from Rehab RN(Elham) and from HCP Nicole Tang. Per Rehab Rn, am signout was that the patient had "coffee ground" emesis x2 in the setting of elevated INR of 3.06. RN additionally reported that warfarin dose was held last night and VS of the patient at that time were afebrile, 130/20, 88 and on RA. In discussion with the patient's HCP via telephone, she reports that the patient had complaint of abdominal pain for the last 2d and is frustrated that the rehab did not send the patient for evaluation and the hospital sooner. Patient per chart was treated for RLL pneumonia and new b/l LE DVT (Right femoral/deep femoral/common femoral/external iliac and left deep femoral and common femoral) and was evaluated for encephalopathy from uremia however appeared to have progressive dementia. Unable to obtain other history given that the patient is A&Ox1 and is not reliable however at this time he reports not having pain."      Patient admitted 4/20 - 5/2 and readmitted on 5/4/18.  There is a HCP on the chart dated 4/20/18 indicating Celestina Tang as [phone] / [phone].  Patient is currently a full code. Patient with fluctuating mental status.  He is from [facility], has a history of recent and frequent falls."  Will be scanned into [employer].     LCSW met with patient who reported he had 2 brothers, one of whom is Dr. Reza Enriquez who works at [employer],  Patient reports he has 2 sons, named Reza and Aubrey (Aubrey lives in the [place]) and 4 grandchildren.  SW notes from prior admission indicate patient was previously  - patient denied.  Patient was previously an  and an . Patient previously belonged to [facility] in [place].  Patient wearing gloves on his hand "to stay clean".  Patient indicated "I don't trust doctors - they steal from me." Patient in need of a shave and dental care.  LCSW will contact Celestina to clarify or confirm above information presented by patient.  Patient does not appear to be a reliable  at this time.      LCSW spoke with  Celestina Latif for 35 minutes to clarify/verify above information provided by patient.  Celestina is not patient's spouse - she has been a caregiver for patient for several years. They originally met over 30 years ago through a .  Patient was previously her  and CPA. Celestina reports patient "outlived all his clients."  Patient previously lived in his own apartment. Celestina reports she took patient into her home when patient was about to lose his apartment due to financial reasons.  She self describes her role as his caregiver as a "major burden, a colossal burden", indicating first I took care of feeding him and then I took care of his shelter."      Celestina reported patient was  and he has 2 sons, named Aubrye and Cachorro.  Son Aubrey is , lives in [place] and works as an anesthesiologist at [employer].  Cachorro is an  and she met him for the first time this past Sunday. "Their mother turned them against their father and they are estranged."  Patient does have a brother named Reza who lives in [place] and is not a MD, rather he works for an [employer].  Patient was [facility] at [facility].      Celestina is trying to pursue becoming a paid caregiver for patient through Medicaid.  She stated he has "nothing financially, no prepaid burial, no assets, no resources."  She is attending a lunch sponsored by an  tomorrow and hopes to have her legal questions addressed.  She is dissatisfied with [facility] and wants patient to be discharged to [facility].  Charu wants patient to remain a full code at this time. She indicated patient had capacity for decision making at the time the HCP was completed.       [facility] NP provided to Celestina.  She is receptive to a family meeting in the future with patient's providers but won't be in the hospital until late afternoon tomorrow.  Discharge plan pending further hospital course.

## 2018-05-08 NOTE — PROGRESS NOTE ADULT - SUBJECTIVE AND OBJECTIVE BOX
Pre-Endoscopy Evaluation      Referring Physician:   Dr. Zeeshan Pham                               Procedure: EGD    Indication for Procedure: GIB    Pertinent History: 88 year old male with Dementia, DVT on Coumadin, BPH, Obstructive Uropathy presents from nursing home with episodes coffee ground emesis    Sedation by Anesthesia [x]    PAST MEDICAL & SURGICAL HISTORY:  Inguinal hernia  BPH (benign prostatic hyperplasia)  Dementia  DVT (deep venous thrombosis)  No significant past surgical history      PMH of Gastroparesis [ ]  Gastric Surgery [ ]  Gastric Outlet Obstruction [ ]    Allergies    No Known Allergies    Intolerances    Latex allergy: [ ] yes [x] no    Medications:MEDICATIONS  (STANDING):  heparin  Injectable 5000 Unit(s) SubCutaneous every 8 hours  pantoprazole  Injectable 40 milliGRAM(s) IV Push two times a day    MEDICATIONS  (PRN):      Smoking: [ ] yes  [x] no    AICD/PPM: [ ] yes   [x] no    Pertinent lab data:                        10.0   6.4   )-----------( 412      ( 08 May 2018 08:57 )             30.4     05-07    145  |  107  |  18  ----------------------------<  109<H>  4.3   |  29  |  0.97    Ca    8.5      07 May 2018 04:56  Phos  2.5     05-07  Mg     2.1     05-07      Physical Examination:    Daily   Vital Signs Last 24 Hrs  T(C): 36.7 (08 May 2018 08:50), Max: 37.1 (07 May 2018 19:48)  T(F): 98.1 (08 May 2018 08:50), Max: 98.8 (07 May 2018 19:48)  HR: 86 (08 May 2018 08:50) (80 - 86)  BP: 150/84 (08 May 2018 08:50) (140/76 - 150/84)  BP(mean): --  RR: 18 (08 May 2018 08:50) (18 - 18)  SpO2: 97% (08 May 2018 08:50) (96% - 97%)    Drug Dosing Weight  Height (cm): 172.72 (08 May 2018 13:49)  Weight (kg): 72.6 (08 May 2018 13:49)  BMI (kg/m2): 24.3 (08 May 2018 13:49)  BSA (m2): 1.86 (08 May 2018 13:49)    Constitutional: NAD   Neck:  No JVD  Respiratory: CTAB/L  Cardiovascular: S1 and S2  Gastrointestinal: BS+, soft, NT/ND  Extremities: No peripheral edema  Neurological: A/O x 3  : No Junior  Skin: No rashes    Comments:    ASA Class: I [ ]  II [ ]  III [ X]  IV [ ]    The patient is a suitable candidate for the planned procedure unless box checked [ ]  No, explain:

## 2018-05-08 NOTE — PROGRESS NOTE ADULT - SUBJECTIVE AND OBJECTIVE BOX
Patient is a 86y old  Male who presents with a chief complaint of 86M sent in from Rehab for "coffee ground emesis"x2 (04 May 2018 14:38)      SUBJECTIVE / OVERNIGHT EVENTS: no events overnight, will undergo EGD today    MEDICATIONS  (STANDING):  heparin  Injectable 5000 Unit(s) SubCutaneous every 8 hours  pantoprazole  Injectable 40 milliGRAM(s) IV Push two times a day    MEDICATIONS  (PRN):        CAPILLARY BLOOD GLUCOSE        I&O's Summary    07 May 2018 07:01  -  08 May 2018 07:00  --------------------------------------------------------  IN: 240 mL / OUT: 800 mL / NET: -560 mL      Vital Signs Last 24 Hrs  T(C): 36.8 (08 May 2018 04:12), Max: 37.1 (07 May 2018 19:48)  T(F): 98.3 (08 May 2018 04:12), Max: 98.8 (07 May 2018 19:48)  HR: 80 (08 May 2018 04:12) (80 - 87)  BP: 140/76 (08 May 2018 04:12) (140/76 - 160/83)  BP(mean): --  RR: 18 (08 May 2018 04:12) (18 - 18)  SpO2: 96% (08 May 2018 04:12) (96% - 97%)    PHYSICAL EXAM:  GENERAL: NAD, appears frail  HEAD:  Atraumatic, Normocephalic  EYES: EOMI, PERRLA, conjunctiva and sclera clear  ENT: Poor dentition.  NECK: Supple  CHEST/LUNG: Clear to auscultation bilaterally; No wheeze  HEART: Regular rate and rhythm; No murmurs, rubs, or gallops  ABDOMEN: Noted large L. inguinal hernia, with hyperactive BS, otherwise Soft, Nontender, Nondistended;   EXTREMITIES:  2+ Peripheral Pulses, No clubbing, cyanosis, or edema  NEUROLOGY: AAO x 1 to name, non-focal, moves extremities voluntarily,  SKIN: No rashes or lesions    LABS:                        9.9    6.8   )-----------( 412      ( 07 May 2018 20:45 )             29.8     05-07    145  |  107  |  18  ----------------------------<  109<H>  4.3   |  29  |  0.97    Ca    8.5      07 May 2018 04:56  Phos  2.5     05-07  Mg     2.1     05-07        RADIOLOGY & ADDITIONAL TESTS:    Imaging Personally Reviewed: EGD today    Consultant(s) Notes Reviewed:  Cardiology, Surgery    Care Discussed with Consultants/Other Providers: Patient is a 86y old  Male who presents with a chief complaint of 86M sent in from Rehab for "coffee ground emesis"x2 (04 May 2018 14:38)      SUBJECTIVE / OVERNIGHT EVENTS: no events overnight, will undergo EGD today    MEDICATIONS  (STANDING):  heparin  Injectable 5000 Unit(s) SubCutaneous every 8 hours  pantoprazole  Injectable 40 milliGRAM(s) IV Push two times a day    MEDICATIONS  (PRN):        CAPILLARY BLOOD GLUCOSE        I&O's Summary    07 May 2018 07:01  -  08 May 2018 07:00  --------------------------------------------------------  IN: 240 mL / OUT: 800 mL / NET: -560 mL      Vital Signs Last 24 Hrs  T(C): 36.8 (08 May 2018 04:12), Max: 37.1 (07 May 2018 19:48)  T(F): 98.3 (08 May 2018 04:12), Max: 98.8 (07 May 2018 19:48)  HR: 80 (08 May 2018 04:12) (80 - 87)  BP: 140/76 (08 May 2018 04:12) (140/76 - 160/83)  BP(mean): --  RR: 18 (08 May 2018 04:12) (18 - 18)  SpO2: 96% (08 May 2018 04:12) (96% - 97%)    PHYSICAL EXAM:  GENERAL: NAD, appears frail  HEAD:  Atraumatic, Normocephalic  ENT: Poor dentition.  CHEST/LUNG: Clear to auscultation bilaterally; No wheeze  HEART: Regular rate and rhythm; No murmurs, rubs, or gallops  ABDOMEN: Noted large L. inguinal hernia, with hyperactive BS, otherwise Soft, Nontender, Nondistended;   : Junior catheter in place.  EXTREMITIES:  2+ Peripheral Pulses, No clubbing, cyanosis, or edema  NEUROLOGY: AAO x 1 to name, non-focal, moves extremities voluntarily,  SKIN: No rashes or lesions    LABS:                        9.9    6.8   )-----------( 412      ( 07 May 2018 20:45 )             29.8     05-07    145  |  107  |  18  ----------------------------<  109<H>  4.3   |  29  |  0.97    Ca    8.5      07 May 2018 04:56  Phos  2.5     05-07  Mg     2.1     05-07        RADIOLOGY & ADDITIONAL TESTS:    Imaging Personally Reviewed: EGD today    Consultant(s) Notes Reviewed:  Cardiology, Surgery    Care Discussed with Consultants/Other Providers: GI team, Cardiology

## 2018-05-08 NOTE — CONSULT NOTE ADULT - SUBJECTIVE AND OBJECTIVE BOX
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 As per RN additionally reported that warfarin dose was held last night and VS of the patient at that time were afebrile, 130/20, 88 and on RA. In discussion with the patient's HCP via telephone, she reports that the patient had complaint of abdominal pain for the last 2d and is frustrated that the rehab did not send the patient for evaluation and the hospital sooner. Patient per chart was treated for RLL pneumonia and new b/l LE DVT (Right femoral/deep femoral/common femoral/external iliac and left deep femoral and common femoral) and was evaluated for encephalopathy from uremia however appeared to have progressive dementia. Patient examined while resting in bed in no acute distress. He was alert to person and place. He denied any pain or any other symptoms but at times went off on tangent.      PERTINENT PMH REVIEWED:  [x ] YES [ ] NO           SOCIAL HISTORY:  Significant other/partner:  [ x] YES  [ ] NO            Children:  [ ] YES  [x ] NO                   Anabaptism/Spirituality:  Substance hx:  [ ] YES   [x ] NO           Tobacco hx:  [ ] YES  [ x] NO             Alcohol hx: [ ] YES  [x ] NO        Home Opioid hx:  [ ] YES  [x ] NO   Living Situation: [x ] Home  [ ] Long term care  [ ] Rehab    FAMILY HISTORY:  No pertinent family history in first degree relatives    [ ] Family history non contributory     BASELINE ADLs (prior to admission):  Independent [ ] moderately [ ] fully   Dependent   [ ] moderately [ ] fully    Code Status:                      MOLST  [ ] YES [ ] NO    Living Will  [ ] YES [ ] NO    Health Care Proxy [ ] YES  [ ] NO      [ ] Surrogate  [x ] HCP  [ ] Guardian:     Nicole Tang                                                             Phone#:    Allergies    No Known Allergies    Intolerances        MEDICATIONS  (STANDING):  heparin  Injectable 5000 Unit(s) SubCutaneous every 8 hours  pantoprazole  Injectable 40 milliGRAM(s) IV Push two times a day    MEDICATIONS  (PRN):      PRESENT SYMPTOMS:  Source: [ ] Patient   [ ] Family   [ ] Team     Pain: [ ] YES [ ] NO  Onset:                    Location:                          Duration:                 Character:            Aggravating factors:                        Relieving factors:    Radiation:              Timing:                             Severity:      Dyspnea: [ ] YES [ ] NO - Mild [ ]  Moderate [ ]  Severe [ ]    Anxiety: [ ] YES [ ] NO  Fatigue: [ ] YES [ ] NO   Nausea: [ ] YES [ ] NO  Loss of appetite: [ ] YES [ ] NO   Constipation: [ ] YES [ ] NO     Other Symptoms:  [ ] All other review of systems negative   [ ] Unable to obtain due to poor mentation     Does patient meet criteria for Severe Protein Calorie Malnutrition?  Yes [ ]  No [ ]  PPSV 30% or below [ ]  Anasarca [ ]  Albumin < 2 [ ] Catabolic State [ ] Poor nutritional intake [ ] Significant weight loss [ ]      Palliative Performance Status Version 2:         %  ECOG -        Vital Signs Last 24 Hrs  T(C): 36.7 (08 May 2018 08:50), Max: 37.1 (07 May 2018 19:48)  T(F): 98.1 (08 May 2018 08:50), Max: 98.8 (07 May 2018 19:48)  HR: 86 (08 May 2018 08:50) (80 - 87)  BP: 150/84 (08 May 2018 08:50) (140/76 - 160/83)  BP(mean): --  RR: 18 (08 May 2018 08:50) (18 - 18)  SpO2: 97% (08 May 2018 08:50) (96% - 97%)    Physical Exam:    General: [ ] Alert,  A&O x     [ ] lethargic   [ ] Agitated   [ ] Cachexia   HEENT: [ ] Normal   [ ] Dry mouth   [ ] ET Tube    [ ] Trach   Lungs: [ ] Clear [ ] Rhonchi  [ ] Crackles [ ] Wheezing [ ] Tachypnea  [ ] Audible excessive secretions    Cardiovascular:  [ ] Regular rate and rhythm  [ ] Irregular [ ] Tachycardia   [ ] Bradycardia   Abdomen: [ ] Soft  [ ] Distended  [ ] +BS  [ ] Non tender [ ] Tender  [ ]PEG   [ ] NGT   Last BM:     Genitourinary: [ ] Normal [ ] Incontinent   [ ] Oliguria/Anuria   [ ] Junior  Musculoskeletal:  [ ] Normal   [ ] Generalized weakness  [ ] Bedbound  [ ] Edema   Neurological: [ ] No focal deficits  [ ] Cognitive impairment     Skin: [ ] Normal   [ ] Pressure ulcers     LABS:                        10.0   6.4   )-----------( 412      ( 08 May 2018 08:57 )             30.4     05-07    145  |  107  |  18  ----------------------------<  109<H>  4.3   |  29  |  0.97    Ca    8.5      07 May 2018 04:56  Phos  2.5     05-07  Mg     2.1     05-07          I&O's Summary    07 May 2018 07:01  -  08 May 2018 07:00  --------------------------------------------------------  IN: 240 mL / OUT: 800 mL / NET: -560 mL        RADIOLOGY & ADDITIONAL STUDIES:    Referrals:  Hospice [ ]   Chaplaincy [ ]    Child Life [ ]   Social Work [ ]   Case Management [ ]   Holistic Therapy [ ] HPI:  86 year old man w/ 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 As per RN additionally reported that warfarin dose was held last night and VS of the patient at that time were afebrile, 130/20, 88 and on RA. In discussion with the patient's HCP via telephone, she reports that the patient had complaint of abdominal pain for the last 2d and is frustrated that the rehab did not send the patient for evaluation and the hospital sooner. Patient per chart was treated for RLL pneumonia and new b/l LE DVT (Right femoral/deep femoral/common femoral/external iliac and left deep femoral and common femoral) and was evaluated for encephalopathy from uremia however appeared to have progressive dementia. Patient examined while resting in bed in no acute distress. He was alert to person and place. He denied any pain or any other symptoms but at times went off on tangent. AS per patient Health care proxy, she state that he had a fall on 4/18/2018, and has been declining since then.     PERTINENT PMH REVIEWED:  [x ] YES [ ] NO           SOCIAL HISTORY:  Significant other/partner:  [ x] YES  [ ] NO            Children:  [ ] YES  [x ] NO                   Mandaen/Spirituality:  Substance hx:  [ ] YES   [x ] NO           Tobacco hx:  [ ] YES  [ x] NO             Alcohol hx: [ ] YES  [x ] NO        Home Opioid hx:  [ ] YES  [x ] NO   Living Situation: [x ] Home  [ ] Long term care  [ ] Rehab    FAMILY HISTORY:  No pertinent family history in first degree relatives    [x ] Family history non contributory     BASELINE ADLs (prior to admission):  Independent [x ] moderately [ ] fully   Dependent   [ ] moderately [ ] fully    Code Status:  Full code                    MOLST  [ ] YES [x ] NO    Living Will  [ ] YES [x ] NO    Health Care Proxy [x ] YES  [ ] NO      [ ] Surrogate  [x ] HCP  [ ] Guardian:     Nicole Tang                 Phone#:[phone]    Allergies    No Known Allergies    Intolerances    MEDICATIONS  (STANDING):  heparin  Injectable 5000 Unit(s) SubCutaneous every 8 hours  pantoprazole  Injectable 40 milliGRAM(s) IV Push two times a day    MEDICATIONS  (PRN):    PRESENT SYMPTOMS:  Source: [ x] Patient   [ ] Family   [ ] Team     Pain: [ ] YES [x ] NO    Onset:                    Location:                          Duration:                 Character:            Aggravating factors:                        Relieving factors:    Radiation:              Timing:                             Severity:      Dyspnea: [ ] YES [x ] NO - Mild [ ]  Moderate [ ]  Severe [ ]    Anxiety: [ ] YES [x ] NO  Fatigue: [ ] YES [x ] NO   Nausea: [ ] YES [x ] NO  Loss of appetite: [ ] YES [x ] NO   Constipation: [ ] YES [x ] NO     Other Symptoms:  [x ] All other review of systems negative   [ ] Unable to obtain due to poor mentation     Does patient meet criteria for Severe Protein Calorie Malnutrition?  Yes [ ]  No [ ]  PPSV 30% or below [ ]  Anasarca [ ]  Albumin < 2 [ ] Catabolic State [ ] Poor nutritional intake [ ] Significant weight loss [ ]      Palliative Performance Status Version 2:      50%    Vital Signs Last 24 Hrs  T(C): 36.7 (08 May 2018 08:50), Max: 37.1 (07 May 2018 19:48)  T(F): 98.1 (08 May 2018 08:50), Max: 98.8 (07 May 2018 19:48)  HR: 86 (08 May 2018 08:50) (80 - 87)  BP: 150/84 (08 May 2018 08:50) (140/76 - 160/83)  BP(mean): --  RR: 18 (08 May 2018 08:50) (18 - 18)  SpO2: 97% (08 May 2018 08:50) (96% - 97%)    Physical Exam:  General: [x ] Alert,  A&O x  1-2   [ ] lethargic   [ ] Agitated   [ ] Cachexia   HEENT: [ ] Normal   [x ] Dry mouth   [ ] ET Tube    [ ] Trach   Lungs: [x ] Clear [ ] Rhonchi  [ ] Crackles [ ] Wheezing [ ] Tachypnea  [ ] Audible excessive secretions    Cardiovascular:  [x ] Regular rate and rhythm  [ ] Irregular [ ] Tachycardia   [ ] Bradycardia   Abdomen: [x ] Soft  [ ] Distended  [ ] +BS  [ ] Non tender [ ] Tender  [ ]PEG   [ ] NGT   Last BM:     Genitourinary: [ ] Normal [ ] Incontinent   [ ] Oliguria/Anuria   [x] Junior  Musculoskeletal:  [ ] Normal   [x ] Generalized weakness  [ ] Bedbound  [ ] Edema   Neurological: [ ] No focal deficits  [x ] Cognitive impairment     Skin: [ x] Normal   [ ] Pressure ulcers     LABS:                     10.0   6.4   )-----------( 412      ( 08 May 2018 08:57 )             30.4     05-07    145  |  107  |  18  ----------------------------<  109<H>  4.3   |  29  |  0.97    Ca    8.5      07 May 2018 04:56  Phos  2.5     05-07  Mg     2.1     05-07      I&O's Summary    07 May 2018 07:01  -  08 May 2018 07:00  --------------------------------------------------------  IN: 240 mL / OUT: 800 mL / NET: -560 mL      RADIOLOGY & ADDITIONAL STUDIES:    EXAM:  CT ABDOMEN AND PELVIS IC                        PROCEDURE DATE:  05/06/2018      IMPRESSION:  Compared to the previous CT of May 4, 2018, there has been interval   development of a small bowel obstruction with a transition point   involving the patient's known left indirect inguinal hernia. The   herniated small bowel demonstrates wall thickening and hyperenhancement   with interloop fluid concerning for incarceration/ischemia. The colon   which has also herniated through this hernia is non obstructed    The   findings were discussed with Dr. Lion at 3 am on 5/7/18 with RBV.    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 As per RN additionally reported that warfarin dose was held last night and VS of the patient at that time were afebrile, 130/20, 88 and on RA. In discussion with the patient's HCP via telephone, she reports that the patient had complaint of abdominal pain for the last 2d and is frustrated that the rehab did not send the patient for evaluation and the hospital sooner. Patient per chart was treated for RLL pneumonia and new b/l LE DVT (Right femoral/deep femoral/common femoral/external iliac and left deep femoral and common femoral) and was evaluated for encephalopathy from uremia however appeared to have progressive dementia. Patient examined while resting in bed in no acute distress. He was alert to person and place. He denied any pain or any other symptoms but at times went off on tangent. As per patient Health care proxy, she state that he had a fall on 4/18/2018, and has been declining since then.     PERTINENT PMH REVIEWED:  [x ] YES [ ] NO           SOCIAL HISTORY:  Significant other/partner:  [ x] YES  [ ] NO            Children:  [ ] YES  [x ] NO                   Pentecostal/Spirituality:  Substance hx:  [ ] YES   [x ] NO           Tobacco hx:  [ ] YES  [ x] NO             Alcohol hx: [ ] YES  [x ] NO        Home Opioid hx:  [ ] YES  [x ] NO   Living Situation: [x ] Home  [ ] Long term care  [ ] Rehab    FAMILY HISTORY:  No pertinent family history in first degree relatives    [x ] Family history non contributory     BASELINE ADLs (prior to admission):  Independent [x ] moderately [ ] fully   Dependent   [ ] moderately [ ] fully    Code Status:  Full code                    MOLST  [ ] YES [x ] NO    Living Will  [ ] YES [x ] NO    Health Care Proxy [x ] YES  [ ] NO      [ ] Surrogate  [x ] HCP  [ ] Guardian:     Nicole Tang                 Phone#:[phone]    Allergies    No Known Allergies    Intolerances    MEDICATIONS  (STANDING):  heparin  Injectable 5000 Unit(s) SubCutaneous every 8 hours  pantoprazole  Injectable 40 milliGRAM(s) IV Push two times a day    MEDICATIONS  (PRN):    PRESENT SYMPTOMS:  Source: [ x] Patient   [ ] Family   [ ] Team     Pain: [ ] YES [x ] NO    Onset:                    Location:                          Duration:                 Character:            Aggravating factors:                        Relieving factors:    Radiation:              Timing:                             Severity:      Dyspnea: [ ] YES [x ] NO - Mild [ ]  Moderate [ ]  Severe [ ]    Anxiety: [ ] YES [x ] NO  Fatigue: [ ] YES [x ] NO   Nausea: [ ] YES [x ] NO  Loss of appetite: [ ] YES [x ] NO   Constipation: [ ] YES [x ] NO     Other Symptoms:  [x ] All other review of systems negative   [ ] Unable to obtain due to poor mentation     Does patient meet criteria for Severe Protein Calorie Malnutrition?  Yes [ ]  No [ ]  PPSV 30% or below [ ]  Anasarca [ ]  Albumin < 2 [ ] Catabolic State [ ] Poor nutritional intake [ ] Significant weight loss [ ]      Palliative Performance Status Version 2:      50%    Vital Signs Last 24 Hrs  T(C): 36.7 (08 May 2018 08:50), Max: 37.1 (07 May 2018 19:48)  T(F): 98.1 (08 May 2018 08:50), Max: 98.8 (07 May 2018 19:48)  HR: 86 (08 May 2018 08:50) (80 - 87)  BP: 150/84 (08 May 2018 08:50) (140/76 - 160/83)  BP(mean): --  RR: 18 (08 May 2018 08:50) (18 - 18)  SpO2: 97% (08 May 2018 08:50) (96% - 97%)    Physical Exam:  General: [x ] Alert,  A&O x  1-2   [ ] lethargic   [ ] Agitated   [ ] Cachexia   HEENT: [ ] Normal   [x ] Dry mouth   [ ] ET Tube    [ ] Trach   Lungs: [x ] Clear [ ] Rhonchi  [ ] Crackles [ ] Wheezing [ ] Tachypnea  [ ] Audible excessive secretions    Cardiovascular:  [x ] Regular rate and rhythm  [ ] Irregular [ ] Tachycardia   [ ] Bradycardia   Abdomen: [x ] Soft  [ ] Distended  [ ] +BS  [ ] Non tender [ ] Tender  [ ]PEG   [ ] NGT   Last BM:     Genitourinary: [ ] Normal [ ] Incontinent   [ ] Oliguria/Anuria   [x] Junior  Musculoskeletal:  [ ] Normal   [x ] Generalized weakness  [ ] Bedbound  [ ] Edema   Neurological: [ ] No focal deficits  [x ] Cognitive impairment     Skin: [ x] Normal   [ ] Pressure ulcers     LABS:                     10.0   6.4   )-----------( 412      ( 08 May 2018 08:57 )             30.4     05-07    145  |  107  |  18  ----------------------------<  109<H>  4.3   |  29  |  0.97    Ca    8.5      07 May 2018 04:56  Phos  2.5     05-07  Mg     2.1     05-07      I&O's Summary    07 May 2018 07:01  -  08 May 2018 07:00  --------------------------------------------------------  IN: 240 mL / OUT: 800 mL / NET: -560 mL      RADIOLOGY & ADDITIONAL STUDIES:    EXAM:  CT ABDOMEN AND PELVIS IC                        PROCEDURE DATE:  05/06/2018      IMPRESSION:  Compared to the previous CT of May 4, 2018, there has been interval   development of a small bowel obstruction with a transition point   involving the patient's known left indirect inguinal hernia. The   herniated small bowel demonstrates wall thickening and hyperenhancement   with interloop fluid concerning for incarceration/ischemia. The colon   which has also herniated through this hernia is non obstructed    The   findings were discussed with Dr. Lion at 3 am on 5/7/18 with RBV.    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 from Rehab with complaint of "coffee ground emesis x2" per Rehab RN. As per RN additionally reported that warfarin dose was held last night and VS of the patient at that time were afebrile, 130/20, 88 and on RA. In discussion with the patient's HCP via telephone, she reports that the patient had complaint of abdominal pain for the last 2d and is frustrated that the rehab did not send the patient for evaluation and the hospital sooner. Patient per chart was treated for RLL pneumonia and new b/l LE DVT (Right femoral/deep femoral/common femoral/external iliac and left deep femoral and common femoral) and was evaluated for encephalopathy from uremia however appeared to have progressive dementia. Patient examined while resting in bed in no acute distress. He was alert to person and place. He denied any pain or any other symptoms but at times went off on tangent. As per patient Health care proxy, she state that he had a fall on 4/18/2018, and has been declining since then.     PERTINENT PMH REVIEWED:  [x ] YES [ ] NO           SOCIAL HISTORY:  Significant other/partner:  [ x] YES  [ ] NO            Children:  [ ] YES  [x ] NO                   Shinto/Spirituality:  Substance hx:  [ ] YES   [x ] NO           Tobacco hx:  [ ] YES  [ x] NO             Alcohol hx: [ ] YES  [x ] NO        Home Opioid hx:  [ ] YES  [x ] NO   Living Situation: [x ] Home  [ ] Long term care  [ ] Rehab    FAMILY HISTORY:  No pertinent family history in first degree relatives    [x ] Family history non contributory     BASELINE ADLs (prior to admission):  Independent [x ] moderately [ ] fully   Dependent   [ ] moderately [ ] fully    Code Status:  Full code                    MOLST  [ ] YES [x ] NO    Living Will  [ ] YES [x ] NO    Health Care Proxy [x ] YES  [ ] NO      [ ] Surrogate  [x ] HCP  [ ] Guardian:     Nicole Tang                 Phone#:[phone]    Allergies    No Known Allergies    Intolerances    MEDICATIONS  (STANDING):  heparin  Injectable 5000 Unit(s) SubCutaneous every 8 hours  pantoprazole  Injectable 40 milliGRAM(s) IV Push two times a day    MEDICATIONS  (PRN):    PRESENT SYMPTOMS:  Source: [ x] Patient   [ ] Family   [ ] Team     Pain: [ ] YES [x ] NO    Onset:                    Location:                          Duration:                 Character:            Aggravating factors:                        Relieving factors:    Radiation:              Timing:                             Severity:      Dyspnea: [ ] YES [x ] NO - Mild [ ]  Moderate [ ]  Severe [ ]    Anxiety: [ ] YES [x ] NO  Fatigue: [ ] YES [x ] NO   Nausea: [ ] YES [x ] NO  Loss of appetite: [ ] YES [x ] NO   Constipation: [ ] YES [x ] NO     Other Symptoms:  [x ] All other review of systems negative   [ ] Unable to obtain due to poor mentation     Does patient meet criteria for Severe Protein Calorie Malnutrition?  Yes [ ]  No [ ]  PPSV 30% or below [ ]  Anasarca [ ]  Albumin < 2 [ ] Catabolic State [ ] Poor nutritional intake [ ] Significant weight loss [ ]      Palliative Performance Status Version 2:      50%    Vital Signs Last 24 Hrs  Vital Signs Last 24 Hrs  T(C): 36.7 (08 May 2018 08:50), Max: 37.1 (07 May 2018 19:48)  T(F): 98.1 (08 May 2018 08:50), Max: 98.8 (07 May 2018 19:48)  HR: 86 (08 May 2018 08:50) (80 - 86)  BP: 150/84 (08 May 2018 08:50) (140/76 - 150/84)  BP(mean): --  RR: 18 (08 May 2018 08:50) (18 - 18)  SpO2: 97% (08 May 2018 08:50) (96% - 97%)    Physical Exam:  General: [x ] Alert,  A&O x  1-2   [ ] lethargic   [ ] Agitated   [ ] Cachexia   HEENT: [ ] Normal   [x ] Dry mouth   [ ] ET Tube    [ ] Trach   Lungs: [x ] Clear [ ] Rhonchi  [ ] Crackles [ ] Wheezing [ ] Tachypnea  [ ] Audible excessive secretions    Cardiovascular:  [x ] Regular rate and rhythm  [ ] Irregular [ ] Tachycardia   [ ] Bradycardia   Abdomen: [x ] Soft  [ ] Distended  [ ] +BS  [ ] Non tender [ ] Tender  [ ]PEG   [ ] NGT   Last BM:     Genitourinary: [ ] Normal [ ] Incontinent   [ ] Oliguria/Anuria   [x] Junior  Musculoskeletal:  [ ] Normal   [x ] Generalized weakness  [ ] Bedbound  [ ] Edema   Neurological: [ ] No focal deficits  [x ] Cognitive impairment     Skin: [ x] Normal   [ ] Pressure ulcers     LABS:                     10.0   6.4   )-----------( 412      ( 08 May 2018 08:57 )             30.4     05-07    145  |  107  |  18  ----------------------------<  109<H>  4.3   |  29  |  0.97    Ca    8.5      07 May 2018 04:56  Phos  2.5     05-07  Mg     2.1     05-07      I&O's Summary    07 May 2018 07:01  -  08 May 2018 07:00  --------------------------------------------------------  IN: 240 mL / OUT: 800 mL / NET: -560 mL      RADIOLOGY & ADDITIONAL STUDIES:    EXAM:  CT ABDOMEN AND PELVIS IC                        PROCEDURE DATE:  05/06/2018      IMPRESSION:  Compared to the previous CT of May 4, 2018, there has been interval   development of a small bowel obstruction with a transition point   involving the patient's known left indirect inguinal hernia. The   herniated small bowel demonstrates wall thickening and hyperenhancement   with interloop fluid concerning for incarceration/ischemia. The colon   which has also herniated through this hernia is non obstructed    The   findings were discussed with Dr. Lion at 3 am on 5/7/18 with RBV.    Referrals:  Hospice [ ]   Chaplaincy [ ]    Child Life [ ]   Social Work [ ]   Case Management [ ]   Holistic Therapy [ ]

## 2018-05-09 LAB
APTT BLD: 124.5 SEC — CRITICAL HIGH (ref 27.5–37.4)
APTT BLD: 28.8 SEC — SIGNIFICANT CHANGE UP (ref 27.5–37.4)
HCT VFR BLD CALC: 30.7 % — LOW (ref 39–50)
HCT VFR BLD CALC: 34.3 % — LOW (ref 39–50)
HGB BLD-MCNC: 10 G/DL — LOW (ref 13–17)
HGB BLD-MCNC: 11.4 G/DL — LOW (ref 13–17)
MCHC RBC-ENTMCNC: 29.2 PG — SIGNIFICANT CHANGE UP (ref 27–34)
MCHC RBC-ENTMCNC: 30.5 PG — SIGNIFICANT CHANGE UP (ref 27–34)
MCHC RBC-ENTMCNC: 32.6 GM/DL — SIGNIFICANT CHANGE UP (ref 32–36)
MCHC RBC-ENTMCNC: 33.2 GM/DL — SIGNIFICANT CHANGE UP (ref 32–36)
MCV RBC AUTO: 89.8 FL — SIGNIFICANT CHANGE UP (ref 80–100)
MCV RBC AUTO: 92 FL — SIGNIFICANT CHANGE UP (ref 80–100)
PLATELET # BLD AUTO: 428 K/UL — HIGH (ref 150–400)
PLATELET # BLD AUTO: 494 K/UL — HIGH (ref 150–400)
RBC # BLD: 3.42 M/UL — LOW (ref 4.2–5.8)
RBC # BLD: 3.73 M/UL — LOW (ref 4.2–5.8)
RBC # FLD: 12.5 % — SIGNIFICANT CHANGE UP (ref 10.3–14.5)
RBC # FLD: 14.3 % — SIGNIFICANT CHANGE UP (ref 10.3–14.5)
WBC # BLD: 10.1 K/UL — SIGNIFICANT CHANGE UP (ref 3.8–10.5)
WBC # BLD: 7.45 K/UL — SIGNIFICANT CHANGE UP (ref 3.8–10.5)
WBC # FLD AUTO: 10.1 K/UL — SIGNIFICANT CHANGE UP (ref 3.8–10.5)
WBC # FLD AUTO: 7.45 K/UL — SIGNIFICANT CHANGE UP (ref 3.8–10.5)

## 2018-05-09 PROCEDURE — 99233 SBSQ HOSP IP/OBS HIGH 50: CPT

## 2018-05-09 PROCEDURE — 99233 SBSQ HOSP IP/OBS HIGH 50: CPT | Mod: GC

## 2018-05-09 RX ORDER — HEPARIN SODIUM 5000 [USP'U]/ML
6000 INJECTION INTRAVENOUS; SUBCUTANEOUS EVERY 6 HOURS
Qty: 0 | Refills: 0 | Status: DISCONTINUED | OUTPATIENT
Start: 2018-05-09 | End: 2018-05-10

## 2018-05-09 RX ORDER — HEPARIN SODIUM 5000 [USP'U]/ML
INJECTION INTRAVENOUS; SUBCUTANEOUS
Qty: 25000 | Refills: 0 | Status: DISCONTINUED | OUTPATIENT
Start: 2018-05-09 | End: 2018-05-09

## 2018-05-09 RX ORDER — HEPARIN SODIUM 5000 [USP'U]/ML
6000 INJECTION INTRAVENOUS; SUBCUTANEOUS EVERY 6 HOURS
Qty: 0 | Refills: 0 | Status: DISCONTINUED | OUTPATIENT
Start: 2018-05-09 | End: 2018-05-09

## 2018-05-09 RX ORDER — HEPARIN SODIUM 5000 [USP'U]/ML
INJECTION INTRAVENOUS; SUBCUTANEOUS
Qty: 25000 | Refills: 0 | Status: DISCONTINUED | OUTPATIENT
Start: 2018-05-09 | End: 2018-05-10

## 2018-05-09 RX ORDER — HEPARIN SODIUM 5000 [USP'U]/ML
3000 INJECTION INTRAVENOUS; SUBCUTANEOUS EVERY 6 HOURS
Qty: 0 | Refills: 0 | Status: DISCONTINUED | OUTPATIENT
Start: 2018-05-09 | End: 2018-05-09

## 2018-05-09 RX ORDER — HEPARIN SODIUM 5000 [USP'U]/ML
3000 INJECTION INTRAVENOUS; SUBCUTANEOUS EVERY 6 HOURS
Qty: 0 | Refills: 0 | Status: DISCONTINUED | OUTPATIENT
Start: 2018-05-09 | End: 2018-05-10

## 2018-05-09 RX ADMIN — HEPARIN SODIUM 5000 UNIT(S): 5000 INJECTION INTRAVENOUS; SUBCUTANEOUS at 05:03

## 2018-05-09 RX ADMIN — HEPARIN SODIUM 1300 UNIT(S)/HR: 5000 INJECTION INTRAVENOUS; SUBCUTANEOUS at 12:37

## 2018-05-09 RX ADMIN — HEPARIN SODIUM 1100 UNIT(S)/HR: 5000 INJECTION INTRAVENOUS; SUBCUTANEOUS at 20:21

## 2018-05-09 RX ADMIN — PANTOPRAZOLE SODIUM 40 MILLIGRAM(S): 20 TABLET, DELAYED RELEASE ORAL at 17:12

## 2018-05-09 RX ADMIN — PANTOPRAZOLE SODIUM 40 MILLIGRAM(S): 20 TABLET, DELAYED RELEASE ORAL at 05:03

## 2018-05-09 NOTE — PHYSICAL THERAPY INITIAL EVALUATION ADULT - IMPAIRMENTS CONTRIBUTING IMPAIRED BED MOBILITY, REHAB EVAL
decreased endurance ; sat x 10 min work on weight shift and balance reaching activity with mod -min of 1/impaired balance/cognition/impaired postural control/decreased flexibility/decreased strength

## 2018-05-09 NOTE — PHYSICAL THERAPY INITIAL EVALUATION ADULT - DISCHARGE DISPOSITION, PT EVAL
rehabilitation facility/return to subacute rehab to increase functional mobiltiy , strength, balance, endurance, fall prevention education continued

## 2018-05-09 NOTE — PHYSICAL THERAPY INITIAL EVALUATION ADULT - PRECAUTIONS/LIMITATIONS, REHAB EVAL
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 Hx collected from Rehab RN(Elham) and from HCP Nicole Tang. Per Rehab Rn, am signout was that the patient had "coffee ground" emesis x2 in the setting of elevated INR of 3.06. RN additionally reported that warfarin dose was held last night and VS of the patient at that time were afebrile, 130/20, 88 and on RA. In discussion with the patient's HCP via telephone, she reports that the patient had complaint of abdominal pain for the last 2d and is frustrated that the rehab did not send the patient for evaluation and the hospital sooner. Patient per chart was treated for RLL pneumonia and new b/l LE DVT (Right femoral/deep femoral/common femoral/external iliac and left deep femoral and common femoral) and was evaluated for encephalopathy from uremia however appeared to have progressive dementia.

## 2018-05-09 NOTE — PHYSICAL THERAPY INITIAL EVALUATION ADULT - GENERAL OBSERVATIONS, REHAB EVAL
pt received in bed nad + cat catheter hematuria small amount in bag ; pt follow simple 1-2 step commands; sacral dressing c,d iheels pink but blanchable pt educate re Zfloats and elevation and understood

## 2018-05-09 NOTE — PROGRESS NOTE ADULT - PROBLEM SELECTOR PLAN 10
scd for dvt ppx  Diet: Dysphagia diet  Susan Palma PGY-1  [facility]re [id number]  Pager # [phone]/ [phone]

## 2018-05-09 NOTE — PHYSICAL THERAPY INITIAL EVALUATION ADULT - REFERRING PHYSICIAN, REHAB EVAL
Albert Pham MD ORDER PT EVAL AND TREAT ; bed rest Albert Pham MD ORDER PT EVAL AND TREAT ; bed rest, spoke with Team 2 Dr cooper [phone] pt can be oob with assist and will place order

## 2018-05-09 NOTE — PROGRESS NOTE ADULT - PROBLEM SELECTOR PLAN 1
Documented dementia, will need to discuss further with HCP to get clearer idea of baseline. Confused during my discussion today.

## 2018-05-09 NOTE — PROGRESS NOTE ADULT - ASSESSMENT
86M with bilateral LE DVT, on coumadin, who presents with coffee ground emesis in the setting of supratherapeutic INR.  Called to evaluate for IVC filter.  As no evidence of active bleeding on EGD, per discussion with GI fellow, no strict contraindication to resuming AC.  Patient does indeed have hematuria, however, hematocrit has remained stable.  As an IVC filter in this patient would be a very strong nidus for further thrombosis, would prefer to rechallenge with heparin prior to committing to an IVC filter.  Further, pt with evidence of IVC compression secondary to swelling in abdominal cavity    -Start heparin gtt, no bolus, monitor for 12-24 hours  -If patient with further evidence of GIB, will proceed with IVC filter  -If tolerates AC, prefer lovenox given better ability to control dosing    D/w Dr. Pham and Dr. Rodríguez

## 2018-05-09 NOTE — PHYSICAL THERAPY INITIAL EVALUATION ADULT - TRANSFER SAFETY CONCERNS NOTED: SIT/STAND, REHAB EVAL
decreased weight-shifting ability/decreased sequencing ability/decreased balance during turns/decreased step length

## 2018-05-09 NOTE — PROGRESS NOTE ADULT - SUBJECTIVE AND OBJECTIVE BOX
Cardiology Progress Note    Interval events: No acute events overnight. Went for endoscopy, report/results not available currently. Patient without complaints.    Medications:  heparin  Injectable 5000 Unit(s) SubCutaneous every 8 hours  pantoprazole  Injectable 40 milliGRAM(s) IV Push two times a day    Review of Systems:  Constitutional: [ ] Fever [ ] Chills [ ] Fatigue [ ] Weight change   HEENT: [ ] Blurred vision [ ] Eye Pain [ ] Headache [ ] Runny nose [ ] Sore Throat   Respiratory: [ ] Cough [ ] Wheezing [ ] Shortness of breath  Cardiovascular: [ ] Chest Pain [ ] Palpitations [ ] COYNE [ ] PND [ ] Orthopnea  Gastrointestinal: [ ] Abdominal Pain [ ] Diarrhea [ ] Constipation [ ] Hemorrhoids [ ] Nausea [ ] Vomiting  Genitourinary: [ ] Nocturia [ ] Dysuria [ ] Incontinence  Extremities: [ ] Swelling [ ] Joint Pain  Neurologic: [ ] Focal deficit [ ] Paresthesias [ ] Syncope  Lymphatic: [ ] Swelling [ ] Lymphadenopathy   Skin: [ ] Rash [ ] Ecchymoses [ ] Wounds [ ] Lesions  Psychiatry: [ ] Depression [ ] Suicidal/Homicidal Ideation [ ] Anxiety [ ] Sleep Disturbances  [x] 10 point review of systems is otherwise negative except as mentioned above            [ ]Unable to obtain    Vitals:  T(C): 36.7 (05-09-18 @ 04:35), Max: 36.7 (05-08-18 @ 08:50)  HR: 84 (05-09-18 @ 04:35) (84 - 88)  BP: 138/76 (05-09-18 @ 04:35) (138/76 - 150/84)  BP(mean): --  RR: 19 (05-09-18 @ 04:35) (18 - 19)  SpO2: 98% (05-09-18 @ 04:35) (97% - 99%)  Wt(kg): --  Daily     Daily   I&O's Summary    07 May 2018 07:01  -  08 May 2018 07:00  --------------------------------------------------------  IN: 240 mL / OUT: 800 mL / NET: -560 mL    08 May 2018 07:01  -  09 May 2018 05:31  --------------------------------------------------------  IN: 0 mL / OUT: 750 mL / NET: -750 mL        Physical Exam:  NAD, cachectic  PERRL, EOMI  Normal oral [place] NC/AT  S1, S2, RRR, No m/r/g appreciated, No edema, no elevation in JVP  Clear to auscultation bilaterally  Soft, Non-tender, Non-distended, BS+  No clubbing, No joint deformity   Non-focal  No lymphadenopathy  AOx0  No rashes, No ecchymoses, No cyanosis      Labs:                        10.0   6.4   )-----------( 412      ( 08 May 2018 08:57 )             30.4     New results/imaging:

## 2018-05-09 NOTE — PROGRESS NOTE ADULT - PROBLEM SELECTOR PLAN 4
Attempted to call HCP Celestina without success, voicemail left asking her to call back with a suitable mutual time for family meeting to discuss overall prognosis, suspected trajectory, and goals of care. Discussed with Dr. Pham. Will continue to follow.

## 2018-05-09 NOTE — PROGRESS NOTE ADULT - ASSESSMENT
86M w/ hx of b/l LE DVT on warfarin, unspecified dementia (A&Ox1), BPH w/ hx of obstructive uropathy, inguinal hernia and recent admission for encephalopathy w/ RLL pnumonia c/b MATTHEW (d/c 5/1/18) presents to [facility] from Rehab with complaint of "coffee ground emesis x2" and found to have elevated INR reversed by ED w/ suspected upper GI bleed 2/2 anticoagulation, with slowly downtrending Hgb without overt signs of bleed awaiting EGD results.

## 2018-05-09 NOTE — PHYSICAL THERAPY INITIAL EVALUATION ADULT - ADDITIONAL COMMENTS
pt came from [facility] ; prior to that was living with Celestina HCP who took pt into her home when he lost apt for financial reasons; SW following case ; pt is estranged from 2 sons [facility] ; prior to that was living with Celestina ROGERS who is girlfriend per pt who took pt into her home when he lost apt for financial reasons; SW following case ; pt is estranged from 2 sons

## 2018-05-09 NOTE — PHYSICAL THERAPY INITIAL EVALUATION ADULT - PERTINENT HX OF CURRENT PROBLEM, REHAB EVAL
CT ABD 5/6/18 : SBO , concern for incarceration /ischemia ,colon herniated through hernia ; 5/4/18 EKG sinus tachycardia , occasional PVC's ; palliative saw for goals of conversation

## 2018-05-09 NOTE — PROGRESS NOTE ADULT - ASSESSMENT
86M w/ hx of b/l LE DVT on warfarin, unspecified dementia (A&Ox1), BPH w/ hx of obstructive uropathy, inguinal hernia and recent admission for encephalopathy and PNA here with UGI bleed and elevated INR. Palliative called for GOC.

## 2018-05-09 NOTE — PROGRESS NOTE ADULT - SUBJECTIVE AND OBJECTIVE BOX
HPI:  86 year old man w/ hx of b/l LE DVT on warfarin, unspecified dementia (A&Ox1), BPH w/ hx of obstructive uropathy, inguinal hernia and recent admission for encephalopathy w/ RLL pnumonia c/b MATTHEW (d/c 5/1/18) presents to [facility] from Rehab with complaint of "coffee ground emesis x2" per Rehab RN. As per RN additionally reported that warfarin dose was held last night and VS of the patient at that time were afebrile, 130/20, 88 and on RA. In discussion with the patient's HCP via telephone, she reports that the patient had complaint of abdominal pain for the last 2d and is frustrated that the rehab did not send the patient for evaluation and the hospital sooner. Patient per chart was treated for RLL pneumonia and new b/l LE DVT (Right femoral/deep femoral/common femoral/external iliac and left deep femoral and common femoral) and was evaluated for encephalopathy from uremia however appeared to have progressive dementia. Patient examined while resting in bed in no acute distress. He was alert to person and place. He denied any pain or any other symptoms but at times went off on tangent. As per patient Health care proxy, she state that he had a fall on 4/18/2018, and has been declining since then.     INTERVAL EVENTS: patient in no distress, denying any symptoms    Code Status:  Full code                    MOLST  [ ] YES [x ] NO    Living Will  [ ] YES [x ] NO    Health Care Proxy [x ] YES  [ ] NO      [ ] Surrogate  [x ] HCP  [ ] Guardian:     Nicole Tang                 Phone#:[phone]    Allergies    No Known Allergies    Intolerances    MEDICATIONS  (STANDING):  heparin  Infusion.  Unit(s)/Hr (13 mL/Hr) IV Continuous <Continuous>  pantoprazole  Injectable 40 milliGRAM(s) IV Push two times a day    MEDICATIONS  (PRN):  heparin  Injectable 6000 Unit(s) IV Push every 6 hours PRN For aPTT less than 40  heparin  Injectable 3000 Unit(s) IV Push every 6 hours PRN For aPTT between 40 - 57      PRESENT SYMPTOMS:  Source: [ x] Patient   [ ] Family   [ ] Team     Pain: [ ] YES [x ] NO    Onset:                    Location:                          Duration:                 Character:            Aggravating factors:                        Relieving factors:    Radiation:              Timing:                             Severity:      Dyspnea: [ ] YES [x ] NO - Mild [ ]  Moderate [ ]  Severe [ ]    Anxiety: [ ] YES [x ] NO  Fatigue: [ ] YES [x ] NO   Nausea: [ ] YES [ ] NO  Loss of appetite: [ ] YES [ ] NO   Constipation: [ ] YES [ ] NO     Other Symptoms:  [ ] All other review of systems negative   [x ] Unable to obtain due to poor mentation     Does patient meet criteria for Severe Protein Calorie Malnutrition?  Yes [ ]  No [ ]  PPSV 30% or below [ ]  Anasarca [ ]  Albumin < 2 [ ] Catabolic State [ ] Poor nutritional intake [ ] Significant weight loss [ ]      Palliative Performance Status Version 2:      50%    Vital Signs Last 24 Hrs  T(C): 37.1 (09 May 2018 13:18), Max: 37.1 (09 May 2018 13:18)  T(F): 98.8 (09 May 2018 13:18), Max: 98.8 (09 May 2018 13:18)  HR: 84 (09 May 2018 13:18) (84 - 94)  BP: 135/75 (09 May 2018 13:18) (135/75 - 159/82)  BP(mean): --  RR: 18 (09 May 2018 13:18) (18 - 19)  SpO2: 96% (09 May 2018 13:18) (96% - 99%)    Physical Exam:  General: [x ] Alert,  A&O x  1-2   [ ] lethargic   [ ] Agitated   [ ] Cachexia   HEENT: [ ] Normal   [x ] Dry mouth   [ ] ET Tube    [ ] Trach   Lungs: [x ] Clear [ ] Rhonchi  [ ] Crackles [ ] Wheezing [ ] Tachypnea  [ ] Audible excessive secretions    Cardiovascular:  [x ] Regular rate and rhythm  [ ] Irregular [ ] Tachycardia   [ ] Bradycardia   Abdomen: [x ] Soft  [ ] Distended  [ ] +BS  [ ] Non tender [ ] Tender  [ ]PEG   [ ] NGT   Last BM:     Genitourinary: [ ] Normal [ ] Incontinent   [ ] Oliguria/Anuria   [x] Junior with hematuria  Musculoskeletal:  [ ] Normal   [x ] Generalized weakness  [ ] Bedbound  [ ] Edema   Neurological: [ ] No focal deficits  [x ] Cognitive impairment     Skin: [ x] Normal   [ ] Pressure ulcers     LABS:                                10.0   7.45  )-----------( 428      ( 09 May 2018 07:36 )             30.7          RADIOLOGY & ADDITIONAL STUDIES:    EXAM:  CT ABDOMEN AND PELVIS IC                        PROCEDURE DATE:  05/06/2018      IMPRESSION:  Compared to the previous CT of May 4, 2018, there has been interval   development of a small bowel obstruction with a transition point   involving the patient's known left indirect inguinal hernia. The   herniated small bowel demonstrates wall thickening and hyperenhancement   with interloop fluid concerning for incarceration/ischemia. The colon   which has also herniated through this hernia is non obstructed    The   findings were discussed with Dr. Lion at 3 am on 5/7/18 with RBV.    Referrals:  Hospice [ ]   Chaplaincy [ ]    Child Life [ ]   Social Work [ ]   Case Management [ ]   Holistic Therapy [ ]

## 2018-05-09 NOTE — PHYSICAL THERAPY INITIAL EVALUATION ADULT - IMPAIRED TRANSFERS: SIT/STAND, REHAB EVAL
impaired postural control/decreased strength/impaired balance/decrease endurance , sit-stand x 3 max-mod of1 , last attempt with rn Steffanie mod of2 stood retropulse and list R vc to stand upright and extend knees

## 2018-05-09 NOTE — PHYSICAL THERAPY INITIAL EVALUATION ADULT - FOLLOWS COMMANDS/ANSWERS QUESTIONS, REHAB EVAL
100% of the time/follow simple 1-2 step commands 100% of time ; answers questions appropriateness ? at times

## 2018-05-09 NOTE — PROGRESS NOTE ADULT - SUBJECTIVE AND OBJECTIVE BOX
Patient is a 86y old  Male who presents with a chief complaint of 86M sent in from Rehab for "coffee ground emesis"x2 (04 May 2018 14:38)      SUBJECTIVE / OVERNIGHT EVENTS: No events overnight.     MEDICATIONS  (STANDING):  heparin  Injectable 5000 Unit(s) SubCutaneous every 8 hours  pantoprazole  Injectable 40 milliGRAM(s) IV Push two times a day      I&O's Summary    08 May 2018 07:01  -  09 May 2018 07:00  --------------------------------------------------------  IN: 0 mL / OUT: 750 mL / NET: -750 mL    Vital Signs Last 24 Hrs  T(C): 36.7 (09 May 2018 04:35), Max: 36.7 (08 May 2018 08:50)  T(F): 98 (09 May 2018 04:35), Max: 98.1 (08 May 2018 08:50)  HR: 84 (09 May 2018 04:35) (84 - 88)  BP: 138/76 (09 May 2018 04:35) (138/76 - 150/84)  BP(mean): --  RR: 19 (09 May 2018 04:35) (18 - 19)  SpO2: 98% (09 May 2018 04:35) (97% - 99%)    PHYSICAL EXAM:  GENERAL: NAD, appears frail  HEAD:  Atraumatic, Normocephalic  ENT: Poor dentition.  CHEST/LUNG: Clear to auscultation bilaterally; No wheeze  HEART: Regular rate and rhythm; No murmurs, rubs, or gallops  ABDOMEN: Noted large L. inguinal hernia, with normal BS, otherwise Soft, Nontender, Nondistended;   : Junior catheter in place.  EXTREMITIES:  2+ Peripheral Pulses, No clubbing, cyanosis, or edema  NEUROLOGY: AAO x 1 to name, non-focal, moves extremities voluntarily,  SKIN: No rashes or lesions    LABS:                        10.0   6.4   )-----------( 412      ( 08 May 2018 08:57 )             30.4         RADIOLOGY & ADDITIONAL TESTS:    Imaging Personally Reviewed: Pending EGD results    Consultant(s) Notes Reviewed:  Cardiology, Surgery, GI, Patient is a 86y old  Male who presents with a chief complaint of 86M sent in from Rehab for "coffee ground emesis"x2 (04 May 2018 14:38)      SUBJECTIVE / OVERNIGHT EVENTS: No events overnight.     MEDICATIONS  (STANDING):  heparin  Injectable 5000 Unit(s) SubCutaneous every 8 hours  pantoprazole  Injectable 40 milliGRAM(s) IV Push two times a day      I&O's Summary    08 May 2018 07:01  -  09 May 2018 07:00  --------------------------------------------------------  IN: 0 mL / OUT: 750 mL / NET: -750 mL    Vital Signs Last 24 Hrs  T(C): 36.7 (09 May 2018 04:35), Max: 36.7 (08 May 2018 08:50)  T(F): 98 (09 May 2018 04:35), Max: 98.1 (08 May 2018 08:50)  HR: 84 (09 May 2018 04:35) (84 - 88)  BP: 138/76 (09 May 2018 04:35) (138/76 - 150/84)  BP(mean): --  RR: 19 (09 May 2018 04:35) (18 - 19)  SpO2: 98% (09 May 2018 04:35) (97% - 99%)    PHYSICAL EXAM:  GENERAL: NAD, appears frail  HEAD:  Atraumatic, Normocephalic  ENT: Poor dentition.  CHEST/LUNG: Clear to auscultation bilaterally; No wheeze  HEART: Regular rate and rhythm; No murmurs, rubs, or gallops  ABDOMEN: Noted large L. inguinal hernia, with normal BS, otherwise Soft, Nontender, Nondistended;   : Junior catheter in place, blood tinged.  EXTREMITIES:  2+ Peripheral Pulses, No clubbing, cyanosis, or edema  NEUROLOGY: AAO x 1 to name, non-focal, moves extremities voluntarily,  SKIN: No rashes or lesions    LABS:                        10.0   6.4   )-----------( 412      ( 08 May 2018 08:57 )             30.4         RADIOLOGY & ADDITIONAL TESTS:    Imaging Personally Reviewed: Pending EGD results    Consultant(s) Notes Reviewed:  Cardiology, Surgery, GI,     Discussed case personally with: GI (Dr. Mejia), Cardiology, vascular (Dr. Gardner)

## 2018-05-09 NOTE — PROGRESS NOTE ADULT - PROBLEM SELECTOR PLAN 1
Patient suspected to have upper GI bleed from elevated INR from warfarin dosing     - INR 1.15  -can trend CBC q24 hours  . Obtained consent to transfusion.  - c/w protonix 40mg BID.        - s/p protonix 80mg       - s/p vitamin K and Kcentra.   - Transfusion goal hb >7.  - EGD performed yesterday, pending results. Cardiology rec resuming AC at a later date if intervened on source of bleeding, if no source found will need IVC filter.

## 2018-05-09 NOTE — CHART NOTE - NSCHARTNOTEFT_GEN_A_CORE
EGD Findings (full report to follow)    Findings  A 4 cm hiatus hernia was present.  Esophagogastric landmarks were identified: the Z-line was found at 41 cm, the gastroesophageal junction was found at 41 cm and the site of hiatal narrowing was found at 45 cm from the incisors.  Diffuse mildly congested mucosa was found in the entire examined stomach.  Biopsies were taken with a cold forceps for histology.  Estimated blood loss was minimal.  The examined duodenum was normal.   The cardia and gastric fundus were normal on retroflexion  No evidence of any blood found on exam     Impression  - 4 cm hiatus hernia.   - Esophagogastric landmarks identified.   - Congestive gastropathy.  Biopsied.   - Normal examined duodenum.   - The etiology of this patient's prior coffee-ground emesis was not see on exam.    Recommendations  - Return patient to hospital [facility] for ongoing care.   - Await pathology results.   - Advance diet as tolerated today.   - No evidence of any source of bleeding on this exam. Would recommend following up biopsies to rule out H.pylori prior to considering starting anticoagulation in this patient. Will need to consider risks and benefits of starting anticoagulation in this patient given his hernia and risk of incarceration/ischemic bowel.

## 2018-05-09 NOTE — PROGRESS NOTE ADULT - ASSESSMENT
86M PMHx Dementia, BPH/ubstroctive uropathy recent admission w/ MATTHEW and RLL PNA c/b extensive Rt Common femoral, deep femoral, external iliac and popliteal vein thrombosis and Lt deep common femoral veins. DVT source as likely provoked given compression of IVC from Bladder 2/2 obstructive uropathy however cannot rule out malignancy as pt was found to have RLL opacity concerning for infection vs. malignancy in the setting of an overall bed bound state.     - f/u endoscopy for source of GIB  - monitor H&H  - if endoscopy with potential source of bleeding that is amenable to treatment, could reintroduce anticoagulation at a later date  - if no source of bleeding and anticoagulation cannot be reintroduced, would proceed with IVC filter    Hong Johnson MD

## 2018-05-09 NOTE — PROGRESS NOTE ADULT - PROBLEM SELECTOR PLAN 3
Noted to have supra-therapeutic INR w/ warfarin  - reversed by ED. monitor INR daily. HOLD AC given bleed.   - pending EGD results

## 2018-05-09 NOTE — PHYSICAL THERAPY INITIAL EVALUATION ADULT - IMPAIRMENTS FOUND, PT EVAL
muscle strength/forgetful at times/cognitive impairment/aerobic capacity/endurance/gait, locomotion, and balance

## 2018-05-09 NOTE — PHYSICAL THERAPY INITIAL EVALUATION ADULT - IMPAIRMENTS CONTRIBUTING TO GAIT DEVIATIONS, PT EVAL
decreased flexibility/decrease endurance , mod unsteady/impaired postural control/impaired balance/cognition/decreased strength

## 2018-05-10 DIAGNOSIS — R53.2 FUNCTIONAL QUADRIPLEGIA: ICD-10-CM

## 2018-05-10 LAB
APTT BLD: 124.5 SEC — CRITICAL HIGH (ref 27.5–37.4)
APTT BLD: 35.4 SEC — SIGNIFICANT CHANGE UP (ref 27.5–37.4)
HCT VFR BLD CALC: 31.2 % — LOW (ref 39–50)
HGB BLD-MCNC: 10.3 G/DL — LOW (ref 13–17)
MCHC RBC-ENTMCNC: 30 PG — SIGNIFICANT CHANGE UP (ref 27–34)
MCHC RBC-ENTMCNC: 33 GM/DL — SIGNIFICANT CHANGE UP (ref 32–36)
MCV RBC AUTO: 91 FL — SIGNIFICANT CHANGE UP (ref 80–100)
PLATELET # BLD AUTO: 404 K/UL — HIGH (ref 150–400)
RBC # BLD: 3.43 M/UL — LOW (ref 4.2–5.8)
RBC # FLD: 14.2 % — SIGNIFICANT CHANGE UP (ref 10.3–14.5)
WBC # BLD: 8.31 K/UL — SIGNIFICANT CHANGE UP (ref 3.8–10.5)
WBC # FLD AUTO: 8.31 K/UL — SIGNIFICANT CHANGE UP (ref 3.8–10.5)

## 2018-05-10 PROCEDURE — 99233 SBSQ HOSP IP/OBS HIGH 50: CPT | Mod: GC

## 2018-05-10 PROCEDURE — 99233 SBSQ HOSP IP/OBS HIGH 50: CPT

## 2018-05-10 RX ORDER — ENOXAPARIN SODIUM 100 MG/ML
72 INJECTION SUBCUTANEOUS
Qty: 0 | Refills: 0 | Status: DISCONTINUED | OUTPATIENT
Start: 2018-05-10 | End: 2018-05-10

## 2018-05-10 RX ORDER — ENOXAPARIN SODIUM 100 MG/ML
70 INJECTION SUBCUTANEOUS
Qty: 0 | Refills: 0 | Status: DISCONTINUED | OUTPATIENT
Start: 2018-05-10 | End: 2018-05-11

## 2018-05-10 RX ADMIN — HEPARIN SODIUM 900 UNIT(S)/HR: 5000 INJECTION INTRAVENOUS; SUBCUTANEOUS at 02:34

## 2018-05-10 RX ADMIN — PANTOPRAZOLE SODIUM 40 MILLIGRAM(S): 20 TABLET, DELAYED RELEASE ORAL at 05:31

## 2018-05-10 RX ADMIN — ENOXAPARIN SODIUM 70 MILLIGRAM(S): 100 INJECTION SUBCUTANEOUS at 12:08

## 2018-05-10 RX ADMIN — PANTOPRAZOLE SODIUM 40 MILLIGRAM(S): 20 TABLET, DELAYED RELEASE ORAL at 17:39

## 2018-05-10 NOTE — PROGRESS NOTE ADULT - PROBLEM SELECTOR PLAN 10
scd for dvt ppx  Diet: Dysphagia diet  Susan Palma PGY-1  [facility] [id number]  Pager # [phone]/ [phone] DVT ppx: lovenox BID  Diet: Dysphagia diet  Susan Palma PGY-1  [facility] [id number]  Pager # [phone]/ [phone]

## 2018-05-10 NOTE — PROGRESS NOTE ADULT - PROBLEM SELECTOR PLAN 4
B/l DVT as noted previously  - given hx of bleed with transient hypotension, the risk of ac given the patients state of health provides more risk than benefit  - completed GOC w/ HCP who wants to think things over and for now keep full code. She is agreeable to holding ac on lovenox BID

## 2018-05-10 NOTE — PROGRESS NOTE ADULT - SUBJECTIVE AND OBJECTIVE BOX
[facility] [id number]  OFFICE [phone]                              ********VASCULAR MEDICINE & CARDIOLOGY PROGRESS NOTE********     CC:  Bilateral DVT    INTERVAL HISTORY: Pt started on heparin gtt, tolerating it without any evidence of bleeding. Hematuria has cleared up.         HISTORY OF PRESENT ILLNESS:  HPI:  86M w/ hx of b/l LE DVT on warfarin, unspecified dementia (A&Ox1), BPH w/ hx of obstructive uropathy, inguinal hernia and recent admission for encephalopathy w/ RLL pnumonia c/b MATTHEW (d/c 5/1/18) presents to [facility] from Rehab with complaint of "coffee ground emesis x2" per Rehab RN. Hx collected from Rehab RN(Elham) and from HCP Nicole Tang. Per Rehab Rn, am signout was that the patient had "coffee ground" emesis x2 in the setting of elevated INR of 3.06. RN additionally reported that warfarin dose was held last night and VS of the patient at that time were afebrile, 130/20, 88 and on RA. In discussion with the patient's HCP via telephone, she reports that the patient had complaint of abdominal pain for the last 2d and is frustrated that the rehab did not send the patient for evaluation and the hospital sooner. Patient per chart was treated for RLL pneumonia and new b/l LE DVT (Right femoral/deep femoral/common femoral/external iliac and left deep femoral and common femoral) and was evaluated for encephalopathy from uremia however appeared to have progressive dementia. Unable to obtain other history given that the patient is A&Ox1 and is not reliable however at this time he reports not having pain (04 May 2018 14:38)          Allergies    No Known Allergies    Intolerances    	    MEDICATIONS:  enoxaparin Injectable 70 milliGRAM(s) SubCutaneous two times a day          pantoprazole  Injectable 40 milliGRAM(s) IV Push two times a day          PAST MEDICAL & SURGICAL HISTORY:  Inguinal hernia  BPH (benign prostatic hyperplasia)  Dementia  DVT (deep venous thrombosis)  No significant past surgical history      FAMILY HISTORY:  No pertinent family history in first degree relatives      SOCIAL HISTORY:  unchanged    REVIEW OF SYSTEMS:  CONSTITUTIONAL: No fever, weight loss, or fatigue  EYES: No eye pain, visual disturbances, or discharge  ENMT:  No difficulty hearing, tinnitus, vertigo; No sinus or throat pain  NECK: No pain or stiffness  RESPIRATORY: No cough, wheezing, chills or hemoptysis; No Shortness of Breath  CARDIOVASCULAR: No chest pain, palpitations, passing out, dizziness, or leg swelling  GASTROINTESTINAL: No abdominal or epigastric pain. No nausea, vomiting, or hematemesis; No diarrhea or constipation. No melena or hematochezia.  GENITOURINARY: No dysuria, frequency, hematuria, or incontinence  NEUROLOGICAL: No headaches, memory loss, loss of strength, numbness, or tremors  SKIN: No itching, burning, rashes, or lesions   LYMPH Nodes: No enlarged glands  ENDOCRINE: No heat or cold intolerance; No hair loss  MUSCULOSKELETAL: No joint pain or swelling; No muscle, back, or extremity pain  PSYCHIATRIC: No depression, anxiety, mood swings, or difficulty sleeping  HEME/LYMPH: No easy bruising, or bleeding gums  ALLERY AND IMMUNOLOGIC: No hives or eczema	    [ ] All others negative	  [ ] Unable to obtain    PHYSICAL EXAM:  T(C): 36.7 (05-10-18 @ 05:30), Max: 37.1 (05-09-18 @ 13:18)  HR: 79 (05-10-18 @ 05:30) (79 - 92)  BP: 135/71 (05-10-18 @ 05:30) (122/73 - 147/84)  RR: 18 (05-10-18 @ 05:30) (18 - 18)  SpO2: 95% (05-10-18 @ 05:30) (95% - 98%)  Wt(kg): --  I&O's Summary    09 May 2018 07:01  -  10 May 2018 07:00  --------------------------------------------------------  IN: 1267.5 mL / OUT: 1525 mL / NET: -257.5 mL        Appearance: Normal	  HEENT:   Normal oral mucosa, PERRL, EOMI	  Lymphatic: No lymphadenopathy  Cardiovascular: Normal S1 S2, No JVD, No murmurs, No edema  Respiratory: Lungs clear to auscultation	  Psychiatry: A & O x 3, Mood & affect appropriate  Gastrointestinal:  Soft, Non-tender, + BS	  Skin: No rashes, No ecchymoses, No cyanosis	  Neurologic: Non-focal  Extremities: Normal range of motion, No clubbing, cyanosis or edema  Vascular: Peripheral pulses palpable 2+ bilaterally      LABS:	 	    CBC Full  -  ( 10 May 2018 09:40 )  WBC Count : 8.31 K/uL  Hemoglobin : 10.3 g/dL  Hematocrit : 31.2 %  Platelet Count - Automated : 404 K/uL  Mean Cell Volume : 91.0 fl  Mean Cell Hemoglobin : 30.0 pg  Mean Cell Hemoglobin Concentration : 33.0 gm/dL  Auto Neutrophil # : x  Auto Lymphocyte # : x  Auto Monocyte # : x  Auto Eosinophil # : x  Auto Basophil # : x  Auto Neutrophil % : x  Auto Lymphocyte % : x  Auto Monocyte % : x  Auto Eosinophil % : x  Auto Basophil % : x

## 2018-05-10 NOTE — PROGRESS NOTE ADULT - ASSESSMENT
86M PMHx Dementia, BPH/ubstroctive uropathy recent admission w/ MATTHEW and RLL PNA c/b extensive Rt Common femoral, deep femoral, external iliac and popliteal vein thrombosis and Lt deep common femoral veins. DVT source as likely provoked given compression of IVC from Bladder 2/2 obstructive uropathy however cannot rule out malignancy as pt was found to have RLL opacity concerning for infection vs. malignancy in the setting of an overall bed bound state.     - monitor H&H  - if patient bleeds with heparin challenge, plan for IVC filter as per vascular    Hong Johnson MD

## 2018-05-10 NOTE — PROVIDER CONTACT NOTE (CRITICAL VALUE NOTIFICATION) - BACKGROUND
Pt admit with GI Hemorrhage; hx BPH; dementia; DTV
Pt admit with GI Hemorrhage; hx BPH; dementia; DTV

## 2018-05-10 NOTE — PROGRESS NOTE ADULT - SUBJECTIVE AND OBJECTIVE BOX
Patient is a 86y old  Male who presents with a chief complaint of 86M sent in from Rehab for "coffee ground emesis"x2 (04 May 2018 14:38)      SUBJECTIVE / OVERNIGHT EVENTS: No events overnight. no report of bleeding,    MEDICATIONS  (STANDING):  heparin  Infusion.  Unit(s)/Hr (13 mL/Hr) IV Continuous <Continuous>  pantoprazole  Injectable 40 milliGRAM(s) IV Push two times a day    MEDICATIONS  (PRN):  heparin  Injectable 6000 Unit(s) IV Push every 6 hours PRN For aPTT less than 40  heparin  Injectable 3000 Unit(s) IV Push every 6 hours PRN For aPTT between 40 - 57        CAPILLARY BLOOD GLUCOSE        I&O's Summary    09 May 2018 07:01  -  10 May 2018 07:00  --------------------------------------------------------  IN: 1227 mL / OUT: 525 mL / NET: 702 mL      Vital Signs Last 24 Hrs  T(C): 36.7 (10 May 2018 05:30), Max: 37.1 (09 May 2018 13:18)  T(F): 98 (10 May 2018 05:30), Max: 98.8 (09 May 2018 13:18)  HR: 79 (10 May 2018 05:30) (79 - 94)  BP: 135/71 (10 May 2018 05:30) (122/73 - 159/82)  BP(mean): --  RR: 18 (10 May 2018 05:30) (18 - 18)  SpO2: 95% (10 May 2018 05:30) (95% - 98%)    PHYSICAL EXAM:  GENERAL: NAD, appears frail  HEAD:  Atraumatic, Normocephalic  ENT: Poor dentition.  CHEST/LUNG: Clear to auscultation bilaterally; No wheeze  HEART: Regular rate and rhythm; No murmurs, rubs, or gallops  ABDOMEN: Noted large L. inguinal hernia, with normal BS, otherwise Soft, Nontender, Nondistended;   : Junior catheter in place, blood tinged.  EXTREMITIES:  2+ Peripheral Pulses, No clubbing, cyanosis, or edema  NEUROLOGY: AAO x 1 to name, non-focal, moves extremities voluntarily,  LABS:                        11.4   10.1  )-----------( 494      ( 09 May 2018 19:47 )             34.3           PTT - ( 10 May 2018 02:02 )  PTT:124.5 sec          RADIOLOGY & ADDITIONAL TESTS:    Imaging Personally Reviewed:    Consultant(s) Notes Reviewed:  Cardiology    Care Discussed with Consultants/Other Providers: Patient is a 86y old  Male who presents with a chief complaint of 86M sent in from Rehab for "coffee ground emesis"x2 (04 May 2018 14:38)      SUBJECTIVE / OVERNIGHT EVENTS: No events overnight. no report of bleeding. Mental status unchanged.    MEDICATIONS  (STANDING):  heparin  Infusion.  Unit(s)/Hr (13 mL/Hr) IV Continuous <Continuous>  pantoprazole  Injectable 40 milliGRAM(s) IV Push two times a day    MEDICATIONS  (PRN):  heparin  Injectable 6000 Unit(s) IV Push every 6 hours PRN For aPTT less than 40  heparin  Injectable 3000 Unit(s) IV Push every 6 hours PRN For aPTT between 40 - 57      I&O's Summary    09 May 2018 07:01  -  10 May 2018 07:00  --------------------------------------------------------  IN: 1227 mL / OUT: 525 mL / NET: 702 mL      Vital Signs Last 24 Hrs  T(C): 36.7 (10 May 2018 05:30), Max: 37.1 (09 May 2018 13:18)  T(F): 98 (10 May 2018 05:30), Max: 98.8 (09 May 2018 13:18)  HR: 79 (10 May 2018 05:30) (79 - 94)  BP: 135/71 (10 May 2018 05:30) (122/73 - 159/82)  BP(mean): --  RR: 18 (10 May 2018 05:30) (18 - 18)  SpO2: 95% (10 May 2018 05:30) (95% - 98%)    PHYSICAL EXAM:  GENERAL: NAD, appears frail  HEAD:  Atraumatic, Normocephalic  ENT: Poor dentition.  CHEST/LUNG: Clear to auscultation bilaterally; No wheeze  HEART: Regular rate and rhythm; No murmurs, rubs, or gallops  ABDOMEN: Noted large L. inguinal hernia, with normal BS, otherwise Soft, Nontender, Nondistended;   : Junior catheter in place, blood tinged.  EXTREMITIES:  2+ Peripheral Pulses, No clubbing, cyanosis, or edema  NEUROLOGY: AAO x 1 to name, non-focal, moves extremities voluntarily,  LABS:                        11.4   10.1  )-----------( 494      ( 09 May 2018 19:47 )             34.3           PTT - ( 10 May 2018 02:02 )  PTT:124.5 sec          RADIOLOGY & ADDITIONAL TESTS:    Imaging Personally Reviewed:    Consultant(s) Notes Reviewed:  Cardiology, Palliative Care    Care Discussed with Consultants/Other Providers:

## 2018-05-10 NOTE — PROGRESS NOTE ADULT - ASSESSMENT
PGY 1 Note discussed with supervising resident and primary attending    Patient is a 22y old  Female who presents with a chief complaint of vomiting, heart burn (18 Aug 2017 18:47)      INTERVAL HPI/OVERNIGHT EVENTS: patient was complaining of pete umbilical pain yesterday. which shifted to right lower quadrant today. patient is not having active vomiting but felt nauseated after sip of soup.       MEDICATIONS  (STANDING):  sodium chloride 0.9%. 1000 milliLiter(s) (150 mL/Hr) IV Continuous <Continuous>  potassium chloride    Tablet ER 40 milliEquivalent(s) Oral every 4 hours  pantoprazole    Tablet 40 milliGRAM(s) Oral two times a day before meals    MEDICATIONS  (PRN):  aluminum hydroxide/magnesium hydroxide/simethicone Suspension 30 milliLiter(s) Oral every 4 hours PRN Dyspepsia  ondansetron Injectable 4 milliGRAM(s) IV Push every 4 hours PRN Nausea and/or Vomiting  famotidine    Tablet 20 milliGRAM(s) Oral once PRN If protonix was not given/didn't help      __________________________________________________  REVIEW OF SYSTEMS:    CONSTITUTIONAL: No fever,   EYES: no acute visual disturbances  NECK: No pain or stiffness  RESPIRATORY: No cough; No shortness of breath  CARDIOVASCULAR: No chest pain, no palpitations  GASTROINTESTINAL:right lower quadrant abdominal pain, nausea vomiting  NEUROLOGICAL: No headache or numbness, no tremors  MUSCULOSKELETAL: No joint pain, no muscle pain  GENITOURINARY: no dysuria, no frequency, no hesitancy  PSYCHIATRY: no depression , no anxiety  ALL OTHER  ROS negative        Vital Signs Last 24 Hrs  T(C): 38 (19 Aug 2017 11:08), Max: 38 (19 Aug 2017 11:08)  T(F): 100.4 (19 Aug 2017 11:08), Max: 100.4 (19 Aug 2017 11:08)  HR: 127 (19 Aug 2017 10:02) (59 - 127)  BP: 118/85 (19 Aug 2017 10:02) (94/59 - 118/85)  BP(mean): --  RR: 20 (19 Aug 2017 10:02) (14 - 20)  SpO2: 100% (19 Aug 2017 10:02) (98% - 100%)    ________________________________________________  PHYSICAL EXAM:  GENERAL: NAD  HEENT: Normocephalic;  conjunctivae and sclerae clear; moist mucous membranes;   NECK : supple  CHEST/LUNG: Clear to auscultation bilaterally with good air entry   HEART: S1 S2  regular; no murmurs, gallops or rubs  ABDOMEN:right lower quadrant tenderness  EXTREMITIES: no cyanosis; no edema; no calf tenderness  SKIN: warm and dry; no rash  NERVOUS SYSTEM:  Awake and alert; Oriented  to place, person and time ; no new deficits    _________________________________________________  LABS:                        11.8   10.4  )-----------( 160      ( 19 Aug 2017 07:04 )             35.8     08-19    141  |  108  |  6<L>  ----------------------------<  85  3.3<L>   |  25  |  0.65    Ca    8.3<L>      19 Aug 2017 07:04  Phos  2.5     08-19  Mg     1.9     08-19    TPro  6.7  /  Alb  3.2<L>  /  TBili  1.3<H>  /  DBili  x   /  AST  12  /  ALT  14  /  AlkPhos  50  08-19      RADIOLOGY & ADDITIONAL TESTS:  < from: CT Abdomen and Pelvis w/ Oral Cont and w/ IV Cont (08.19.17 @ 11:25) >  IMPRESSION: Acute appendicitis with areas of fluid as described.    < end of copied text >      Imaging Personally Reviewed:  YES    Consultant(s) Notes Reviewed:   YES    Care Discussed with Consultants :     Plan of care was discussed with patient and /or primary care giver; all questions and concerns were addressed and care was aligned with patient's wishes. 86M with bilateral LE DVT, on coumadin, who presents with coffee ground emesis in the setting of supratherapeutic INR. Currently tolerating hep gtt    -Cont AC, with transition to lovenox  -Monitor H/H, watch for bleeding 86M with bilateral LE DVT, on coumadin, who presents with coffee ground emesis in the setting of supratherapeutic INR. Currently tolerating hep gtt    -Cont AC, with transition to lovenox  -Monitor H/H, watch for bleeding  -Follow up with Dr. Polanco, Tuesday May 22 @8:20 AM.  Dept of Cardiology, [address] [place] [address].  [phone]

## 2018-05-10 NOTE — PROGRESS NOTE ADULT - SUBJECTIVE AND OBJECTIVE BOX
HPI:  86 year old man w/ hx of b/l LE DVT on warfarin, unspecified dementia (A&Ox1), BPH w/ hx of obstructive uropathy, inguinal hernia and recent admission for encephalopathy w/ RLL pnumonia c/b MATTHEW (d/c 5/1/18) presents to [facility] from Rehab with complaint of "coffee ground emesis x2" per Rehab RN. As per RN additionally reported that warfarin dose was held last night and VS of the patient at that time were afebrile, 130/20, 88 and on RA. In discussion with the patient's HCP via telephone, she reports that the patient had complaint of abdominal pain for the last 2d and is frustrated that the rehab did not send the patient for evaluation and the hospital sooner. Patient per chart was treated for RLL pneumonia and new b/l LE DVT (Right femoral/deep femoral/common femoral/external iliac and left deep femoral and common femoral) and was evaluated for encephalopathy from uremia however appeared to have progressive dementia. Patient examined while resting in bed in no acute distress. He was alert to person and place. He denied any pain or any other symptoms but at times went off on tangent. As per patient Health care proxy, she state that he had a fall on 4/18/2018, and has been declining since then.     INTERVAL EVENTS: patient in no distress, denying any symptoms    Code Status:  Full code                    MOLST  [ ] YES [x ] NO    Living Will  [ ] YES [x ] NO    Health Care Proxy [x ] YES  [ ] NO      [ ] Surrogate  [x ] HCP  [ ] Guardian:     Nicole Tang                 Phone#:[phone]    Allergies    No Known Allergies    Intolerances    MEDICATIONS  (STANDING):  enoxaparin Injectable 70 milliGRAM(s) SubCutaneous two times a day  pantoprazole  Injectable 40 milliGRAM(s) IV Push two times a day    MEDICATIONS  (PRN):      PRESENT SYMPTOMS:  Source: [ x] Patient   [ ] Family   [ ] Team     Pain: [ ] YES [x ] NO    Onset:                    Location:                          Duration:                 Character:            Aggravating factors:                        Relieving factors:    Radiation:              Timing:                             Severity:      Dyspnea: [ ] YES [x ] NO - Mild [ ]  Moderate [ ]  Severe [ ]    Anxiety: [ ] YES [x ] NO  Fatigue: [ ] YES [x ] NO   Nausea: [ ] YES [ ] NO  Loss of appetite: [ ] YES [ ] NO   Constipation: [ ] YES [ ] NO     Other Symptoms:  [ ] All other review of systems negative   [x ] Unable to obtain due to poor mentation     Does patient meet criteria for Severe Protein Calorie Malnutrition?  Yes [ ]  No [ ]  PPSV 30% or below [ ]  Anasarca [ ]  Albumin < 2 [ ] Catabolic State [ ] Poor nutritional intake [ ] Significant weight loss [ ]      Palliative Performance Status Version 2:      50%    Vital Signs Last 24 Hrs  T(C): 36.5 (10 May 2018 14:29), Max: 37.1 (09 May 2018 19:51)  T(F): 97.7 (10 May 2018 14:29), Max: 98.8 (09 May 2018 19:51)  HR: 92 (10 May 2018 14:29) (79 - 95)  BP: 152/65 (10 May 2018 14:29) (122/73 - 152/65)  BP(mean): --  RR: 18 (10 May 2018 14:29) (18 - 18)  SpO2: 98% (10 May 2018 14:29) (94% - 98%)    Physical Exam:  General: [x ] Alert,  A&O x  1-2   [ ] lethargic   [ ] Agitated   [ ] Cachexia   HEENT: [ ] Normal   [x ] Dry mouth   [ ] ET Tube    [ ] Trach   Lungs: [x ] Clear [ ] Rhonchi  [ ] Crackles [ ] Wheezing [ ] Tachypnea  [ ] Audible excessive secretions    Cardiovascular:  [x ] Regular rate and rhythm  [ ] Irregular [ ] Tachycardia   [ ] Bradycardia   Abdomen: [x ] Soft  [ ] Distended  [ ] +BS  [ ] Non tender [ ] Tender  [ ]PEG   [ ] NGT   Last BM:     Genitourinary: [ ] Normal [ ] Incontinent   [ ] Oliguria/Anuria   [x] Junior with hematuria  Musculoskeletal:  [ ] Normal   [x ] Generalized weakness  [ ] Bedbound  [ ] Edema   Neurological: [ ] No focal deficits  [x ] Cognitive impairment     Skin: [ x] Normal   [ ] Pressure ulcers     LABS:                                 10.3   8.31  )-----------( 404      ( 10 May 2018 09:40 )             31.2               RADIOLOGY & ADDITIONAL STUDIES:    EXAM:  CT ABDOMEN AND PELVIS IC                        PROCEDURE DATE:  05/06/2018      IMPRESSION:  Compared to the previous CT of May 4, 2018, there has been interval   development of a small bowel obstruction with a transition point   involving the patient's known left indirect inguinal hernia. The   herniated small bowel demonstrates wall thickening and hyperenhancement   with interloop fluid concerning for incarceration/ischemia. The colon   which has also herniated through this hernia is non obstructed    The   findings were discussed with Dr. Lion at 3 am on 5/7/18 with [facility]BV.    Referrals:  Hospice [ ]   Chaplaincy [ ]    Child Life [ ]   Social Work [ ]   Case Management [ ]   Holistic Therapy [ ]

## 2018-05-10 NOTE — PROVIDER CONTACT NOTE (CRITICAL VALUE NOTIFICATION) - ASSESSMENT
Pt a&o1-2; vss; Pt on heparin gtt; bleeding precautions maintained; Critical .5
Pt a&o1-2; vss; Pt on heparin gtt; bleeding precautions maintained; Critical .5

## 2018-05-10 NOTE — PROGRESS NOTE ADULT - PROBLEM SELECTOR PLAN 2
As above. possibility for GI bleed include PUD, exposed vessel vs malignancy.      - Likely precipitated from coumadin. HOLD AC.      - c/w protonix     - monitor w/ cbc.  - Plan as above As above. possibility for GI bleed include PUD, exposed vessel vs malignancy.      - Likely precipitated from coumadin. HOLD AC.      - c/w protonix  - Plan as above

## 2018-05-10 NOTE — PROGRESS NOTE ADULT - PROBLEM SELECTOR PLAN 5
Unspecified dementia  - folate, tsh, b12 normal on last admit  - suspect age-related  -A&Ox1. has exceptional cachexia. Likely needs GOC discussion re: Hospice Unspecified dementia  - folate, tsh, b12 normal on last admit  - suspect age-related  -A&Ox1. has exceptional cachexia.

## 2018-05-10 NOTE — PROVIDER CONTACT NOTE (CRITICAL VALUE NOTIFICATION) - RECOMMENDATIONS
notify provider; follow heparin protocol; continue to monitor
notify provider; follow heparin protocol; continue to monitor

## 2018-05-10 NOTE — PROGRESS NOTE ADULT - SUBJECTIVE AND OBJECTIVE BOX
Cardiology Progress Note    Interval events: No acute events overnight. Started on heparin gtt challenge. Monitoring. Patient confused this morning.    Medications:  heparin  Infusion.  Unit(s)/Hr IV Continuous <Continuous>  heparin  Injectable 6000 Unit(s) IV Push every 6 hours PRN  heparin  Injectable 3000 Unit(s) IV Push every 6 hours PRN  pantoprazole  Injectable 40 milliGRAM(s) IV Push two times a day      Review of Systems:  Constitutional: [ ] Fever [ ] Chills [ ] Fatigue [ ] Weight change   HEENT: [ ] Blurred vision [ ] Eye Pain [ ] Headache [ ] Runny nose [ ] Sore Throat   Respiratory: [ ] Cough [ ] Wheezing [ ] Shortness of breath  Cardiovascular: [ ] Chest Pain [ ] Palpitations [ ] COYNE [ ] PND [ ] Orthopnea  Gastrointestinal: [ ] Abdominal Pain [ ] Diarrhea [ ] Constipation [ ] Hemorrhoids [ ] Nausea [ ] Vomiting  Genitourinary: [ ] Nocturia [ ] Dysuria [ ] Incontinence  Extremities: [ ] Swelling [ ] Joint Pain  Neurologic: [ ] Focal deficit [ ] Paresthesias [ ] Syncope  Lymphatic: [ ] Swelling [ ] Lymphadenopathy   Skin: [ ] Rash [ ] Ecchymoses [ ] Wounds [ ] Lesions  Psychiatry: [ ] Depression [ ] Suicidal/Homicidal Ideation [ ] Anxiety [ ] Sleep Disturbances  [x] 10 point review of systems is otherwise negative except as mentioned above            [ ]Unable to obtain    Vitals:  T(C): 36.7 (05-09-18 @ 23:53), Max: 37.1 (05-09-18 @ 13:18)  HR: 92 (05-09-18 @ 23:53) (82 - 94)  BP: 125/69 (05-09-18 @ 23:53) (122/73 - 159/82)  BP(mean): --  RR: 18 (05-09-18 @ 23:53) (18 - 18)  SpO2: 95% (05-09-18 @ 23:53) (95% - 98%)  Wt(kg): --  Daily     Daily   I&O's Summary    08 May 2018 07:01  -  09 May 2018 07:00  --------------------------------------------------------  IN: 0 mL / OUT: 750 mL / NET: -750 mL    09 May 2018 07:01  -  10 May 2018 05:28  --------------------------------------------------------  IN: 1227 mL / OUT: 525 mL / NET: 702 mL        Physical Exam:  NAD, demented, cachectic  PERRL, EOMI  Normal oral [place] NC/AT  S1, S2, RRR, No m/r/g appreciated, No edema, no elevation in JVP  Clear to auscultation bilaterally  Soft, Non-tender, Non-distended, BS+  No rashes, No ecchymoses, No cyanosis    Labs:                        11.4   10.1  )-----------( 494      ( 09 May 2018 19:47 )             34.3           PTT - ( 10 May 2018 02:02 )  PTT:124.5 sec    New results/imaging:

## 2018-05-10 NOTE — PROGRESS NOTE ADULT - PROBLEM SELECTOR PLAN 4
Unable to discuss with HCP today; was able to reach her once on provided numbers, but she accidentally hung up call and was not answering after. Discussed with Dr. Pham. Goals for continued care are clear, possible discharge soon. In light of this, will sign off, please reconsult PRN.

## 2018-05-10 NOTE — PROGRESS NOTE ADULT - ASSESSMENT
86M w/ hx of b/l LE DVT on warfarin, unspecified dementia (A&Ox1), BPH w/ hx of obstructive uropathy, inguinal hernia and recent admission for encephalopathy w/ RLL pnumonia c/b MATTHEW (d/c 5/1/18) presents to [facility] from Rehab with complaint of "coffee ground emesis x2" and found to have elevated INR reversed by ED w/ suspected upper GI bleed 2/2 anticoagulation, with slowly downtrending Hgb without overt signs of bleed awaiting EGD results. 86M w/ hx of b/l LE DVT on warfarin, unspecified dementia (A&Ox1), BPH w/ hx of obstructive uropathy, inguinal hernia and recent admission for encephalopathy w/ RLL pnumonia c/b MATTHEW (d/c 5/1/18) presents to [facility] from Rehab with complaint of "coffee ground emesis x2" and found to have elevated INR reversed by ED w/ suspected upper GI bleed 2/2 anticoagulation, with slowly downtrending Hgb without overt signs of bleed, EGD no active source of bleed now on lovenox BID for b/l DVT.

## 2018-05-10 NOTE — PROVIDER CONTACT NOTE (CRITICAL VALUE NOTIFICATION) - ACTION/TREATMENT ORDERED:
Team 2/dr Mullins made aware; New rate 9ml/hr; next PTT to be drawn at 08:04
Team 2/dr Mullins made aware; New rate 11ml/hr; next PTT to be drawn at 01:51

## 2018-05-10 NOTE — PROGRESS NOTE ADULT - PROBLEM SELECTOR PLAN 1
Patient suspected to have upper GI bleed from elevated INR from warfarin dosing     - INR 1.15  -can trend CBC q24 hours  . Obtained consent to transfusion.  - c/w protonix 40mg BID.        - s/p protonix 80mg       - s/p vitamin K and Kcentra.   - Transfusion goal hb >7.  - EGD performed yesterday, pending results. Cardiology rec resuming AC at a later date if intervened on source of bleeding, if no source found will need IVC filter. - Hgb stable, possible discharge tomorrow  - c/w protonix 40mg BID.        - s/p protonix 80mg       - s/p vitamin K and Kcentra.   - EGD w/out active bleeding, bx taken. Cardiology rec lovenox BID, if another episode of bleeding will likely need IVC filter.

## 2018-05-11 ENCOUNTER — TRANSCRIPTION ENCOUNTER (OUTPATIENT)
Age: 83
End: 2018-05-11

## 2018-05-11 VITALS
TEMPERATURE: 98 F | RESPIRATION RATE: 18 BRPM | HEART RATE: 97 BPM | DIASTOLIC BLOOD PRESSURE: 74 MMHG | SYSTOLIC BLOOD PRESSURE: 115 MMHG | OXYGEN SATURATION: 97 %

## 2018-05-11 PROCEDURE — 84132 ASSAY OF SERUM POTASSIUM: CPT

## 2018-05-11 PROCEDURE — 99239 HOSP IP/OBS DSCHRG MGMT >30: CPT

## 2018-05-11 PROCEDURE — 96375 TX/PRO/DX INJ NEW DRUG ADDON: CPT

## 2018-05-11 PROCEDURE — 88305 TISSUE EXAM BY PATHOLOGIST: CPT

## 2018-05-11 PROCEDURE — 82803 BLOOD GASES ANY COMBINATION: CPT

## 2018-05-11 PROCEDURE — 85027 COMPLETE CBC AUTOMATED: CPT

## 2018-05-11 PROCEDURE — 96374 THER/PROPH/DIAG INJ IV PUSH: CPT

## 2018-05-11 PROCEDURE — 82947 ASSAY GLUCOSE BLOOD QUANT: CPT

## 2018-05-11 PROCEDURE — 93005 ELECTROCARDIOGRAM TRACING: CPT

## 2018-05-11 PROCEDURE — 86900 BLOOD TYPING SEROLOGIC ABO: CPT

## 2018-05-11 PROCEDURE — 74177 CT ABD & PELVIS W/CONTRAST: CPT

## 2018-05-11 PROCEDURE — 80053 COMPREHEN METABOLIC PANEL: CPT

## 2018-05-11 PROCEDURE — 83605 ASSAY OF LACTIC ACID: CPT

## 2018-05-11 PROCEDURE — 83735 ASSAY OF MAGNESIUM: CPT

## 2018-05-11 PROCEDURE — 88342 IMHCHEM/IMCYTCHM 1ST ANTB: CPT

## 2018-05-11 PROCEDURE — 97162 PT EVAL MOD COMPLEX 30 MIN: CPT

## 2018-05-11 PROCEDURE — 74018 RADEX ABDOMEN 1 VIEW: CPT

## 2018-05-11 PROCEDURE — 85610 PROTHROMBIN TIME: CPT

## 2018-05-11 PROCEDURE — 99285 EMERGENCY DEPT VISIT HI MDM: CPT | Mod: 25

## 2018-05-11 PROCEDURE — 86850 RBC ANTIBODY SCREEN: CPT

## 2018-05-11 PROCEDURE — 86901 BLOOD TYPING SEROLOGIC RH(D): CPT

## 2018-05-11 PROCEDURE — 81001 URINALYSIS AUTO W/SCOPE: CPT

## 2018-05-11 PROCEDURE — 82435 ASSAY OF BLOOD CHLORIDE: CPT

## 2018-05-11 PROCEDURE — 99233 SBSQ HOSP IP/OBS HIGH 50: CPT | Mod: GC

## 2018-05-11 PROCEDURE — 82330 ASSAY OF CALCIUM: CPT

## 2018-05-11 PROCEDURE — 82272 OCCULT BLD FECES 1-3 TESTS: CPT

## 2018-05-11 PROCEDURE — 85730 THROMBOPLASTIN TIME PARTIAL: CPT

## 2018-05-11 PROCEDURE — 85014 HEMATOCRIT: CPT

## 2018-05-11 PROCEDURE — 80048 BASIC METABOLIC PNL TOTAL CA: CPT

## 2018-05-11 PROCEDURE — 84100 ASSAY OF PHOSPHORUS: CPT

## 2018-05-11 PROCEDURE — 84295 ASSAY OF SERUM SODIUM: CPT

## 2018-05-11 PROCEDURE — 74176 CT ABD & PELVIS W/O CONTRAST: CPT

## 2018-05-11 PROCEDURE — 88312 SPECIAL STAINS GROUP 1: CPT

## 2018-05-11 PROCEDURE — 87086 URINE CULTURE/COLONY COUNT: CPT

## 2018-05-11 PROCEDURE — 88341 IMHCHEM/IMCYTCHM EA ADD ANTB: CPT

## 2018-05-11 RX ORDER — ENOXAPARIN SODIUM 100 MG/ML
70 INJECTION SUBCUTANEOUS
Qty: 0 | Refills: 0 | COMMUNITY
Start: 2018-05-11

## 2018-05-11 RX ORDER — PANTOPRAZOLE SODIUM 20 MG/1
40 TABLET, DELAYED RELEASE ORAL
Qty: 0 | Refills: 0 | COMMUNITY
Start: 2018-05-11

## 2018-05-11 RX ADMIN — PANTOPRAZOLE SODIUM 40 MILLIGRAM(S): 20 TABLET, DELAYED RELEASE ORAL at 05:10

## 2018-05-11 RX ADMIN — ENOXAPARIN SODIUM 70 MILLIGRAM(S): 100 INJECTION SUBCUTANEOUS at 05:10

## 2018-05-11 NOTE — PROGRESS NOTE ADULT - PROBLEM SELECTOR PROBLEM 2
Upper GI bleed

## 2018-05-11 NOTE — PROGRESS NOTE ADULT - PROBLEM SELECTOR PROBLEM 1
Anemia due to blood loss
Dementia
Dementia
Anemia due to blood loss
Anemia due to blood loss

## 2018-05-11 NOTE — PROGRESS NOTE ADULT - PROBLEM SELECTOR PLAN 1
- Hgb stable, possible discharge today  - c/w protonix 40mg BID.        - s/p protonix 80mg       - s/p vitamin K and Kcentra.   - EGD w/out active bleeding, bx taken. Cardiology rec lovenox BID, if another episode of bleeding will likely need IVC filter.

## 2018-05-11 NOTE — PROGRESS NOTE ADULT - PROBLEM SELECTOR PROBLEM 4
DVT (deep venous thrombosis)
Encounter for palliative care
Encounter for palliative care
DVT (deep venous thrombosis)
DVT (deep venous thrombosis)

## 2018-05-11 NOTE — PROGRESS NOTE ADULT - PROBLEM SELECTOR PLAN 2
As above. possibility for GI bleed include PUD, exposed vessel vs malignancy.      - Likely precipitated from coumadin.     - c/w protonix  - Plan as above

## 2018-05-11 NOTE — DISCHARGE NOTE ADULT - MEDICATION SUMMARY - MEDICATIONS TO STOP TAKING
I will STOP taking the medications listed below when I get home from the hospital:    warfarin 3 mg oral tablet  -- 1 tab(s) by mouth once a day atleast 3 months

## 2018-05-11 NOTE — DISCHARGE NOTE ADULT - MEDICATION SUMMARY - MEDICATIONS TO TAKE
I will START or STAY ON the medications listed below when I get home from the hospital:    tamsulosin 0.4 mg oral capsule  -- 1 cap(s) by mouth once a day (at bedtime)  -- Indication: For BPH (benign prostatic hyperplasia)    enoxaparin  -- 70 unit(s) subcutaneous 2 times a day  -- Indication: For DVT (deep venous thrombosis)    pantoprazole 40 mg intravenous injection  -- 40 milligram(s) intravenous 2 times a day  -- Indication: For Upper GI bleed

## 2018-05-11 NOTE — DISCHARGE NOTE ADULT - PLAN OF CARE
Reversed with K-centra and oral Vitamin K You came into the hospital with blood in your vomit. Your coumadin levels were high therefore we gave you medication to reverse it. You Your prostate is enlarged. You have a cat catheter that drains urine from your bladder.t is likely you will need cat for a long time until you start urinating on your own. You have blood clots in your vein. Therefore you will need to take lovenox injections for 3-6 months. Your primary care doctor can assess if you need to continue on lovenox. Please call [phone] to make an appointment with a [facility] primary care doctor. There is a risk of bleeding with this medication therefore if you notice blood in your stools or urine or vomit blood please obtain medical care immediately. You came into the hospital with blood in your vomit. Your coumadin levels were high therefore we gave you medication to reverse it. You are on a different blood thinner now. If you have any episodes of bleeding please seek medical care immediately. Your prostate is enlarged. You have a cat catheter that drains urine from your bladder. It is likely you will need cat for a long time until you start urinating on your own. You have dementia at baseline. You are alert and oriented to only your name and sometimes the place. Please see your primary care doctor to assess your dementia.

## 2018-05-11 NOTE — PROGRESS NOTE ADULT - PROBLEM SELECTOR PLAN 9
- HCP aware of risks of resuscitation process. c/w Full Code  - will do palliative consult
- In summary HCP spoke with palliative team, wishes for full code, in process of applying for medicaid as patient has no financial means, no personal assets, reported patient lives at [facility] and now HCP feels its a burden to take care of patient at home.  - undergoing medicaid eval.
- HCP aware of risks of resuscitation process. c/w Full Code
- HCP aware of risks of resuscitation process. c/w Full Code  - agreed to palliative consult
- In summary HCP spoke with palliative team, wishes for full code, in process of applying for medicaid as patient has no financial means, no personal assets, reported patient lives at [facility] and now HCP feels its a burden to take care of patient at home.  - undergoing medicaid eval.
- In summary HCP spoke with palliative team, wishes for full code, in process of applying for medicaid as patient has no financial means, no personal assets, reported patient lives at [facility] and now HCP feels its a burden to take care of patient at home.  - undergoing medicaid eval.
face to face time spent 35 min discussing patients health w/ HCP. gave medical opinion that patient is at end of natural life and appears to be in dying process. Informed that resuscitation process would provide significant harm and pain and HCP aware but for now wants full code and all treatment modes offered.

## 2018-05-11 NOTE — PROGRESS NOTE ADULT - SUBJECTIVE AND OBJECTIVE BOX
Cardiology Progress Note    Interval events: Patient initiated on lovenox 70 mg BID. Tolerating it thus far. Patient without complaints this morning.    Tele: none    Medications:  enoxaparin Injectable 70 milliGRAM(s) SubCutaneous two times a day  pantoprazole  Injectable 40 milliGRAM(s) IV Push two times a day      Review of Systems:  Constitutional: [ ] Fever [ ] Chills [ ] Fatigue [ ] Weight change   HEENT: [ ] Blurred vision [ ] Eye Pain [ ] Headache [ ] Runny nose [ ] Sore Throat   Respiratory: [ ] Cough [ ] Wheezing [ ] Shortness of breath  Cardiovascular: [ ] Chest Pain [ ] Palpitations [ ] COYNE [ ] PND [ ] Orthopnea  Gastrointestinal: [ ] Abdominal Pain [ ] Diarrhea [ ] Constipation [ ] Hemorrhoids [ ] Nausea [ ] Vomiting  Genitourinary: [ ] Nocturia [ ] Dysuria [ ] Incontinence  Extremities: [ ] Swelling [ ] Joint Pain  Neurologic: [ ] Focal deficit [ ] Paresthesias [ ] Syncope  Lymphatic: [ ] Swelling [ ] Lymphadenopathy   Skin: [ ] Rash [ ] Ecchymoses [ ] Wounds [ ] Lesions  Psychiatry: [ ] Depression [ ] Suicidal/Homicidal Ideation [ ] Anxiety [ ] Sleep Disturbances  [x] 10 point review of systems is otherwise negative except as mentioned above            [ ]Unable to obtain    Vitals:  T(C): 36.7 (05-11-18 @ 04:23), Max: 36.7 (05-10-18 @ 05:30)  HR: 88 (05-11-18 @ 04:23) (79 - 95)  BP: 142/77 (05-11-18 @ 04:23) (120/65 - 152/65)  BP(mean): --  RR: 17 (05-11-18 @ 04:23) (17 - 18)  SpO2: 96% (05-11-18 @ 04:23) (94% - 98%)  Wt(kg): --  Daily     Daily   I&O's Summary    09 May 2018 07:01  -  10 May 2018 07:00  --------------------------------------------------------  IN: 1267.5 mL / OUT: 1525 mL / NET: -257.5 mL    10 May 2018 07:01  -  11 May 2018 05:27  --------------------------------------------------------  IN: 600 mL / OUT: 400 mL / NET: 200 mL        Physical Exam:  NAD, demented, cachectic  PERRL, EOMI  Normal oral muscosa NC/AT  S1, S2, RRR, No m/r/g appreciated, No edema, no elevation in JVP  Clear to auscultation bilaterally  Soft, Non-tender, Non-distended, BS+  No clubbing, No joint deformity   No rashes, No ecchymoses, No cyanosis    Labs:                        10.3   8.31  )-----------( 404      ( 10 May 2018 09:40 )             31.2           PTT - ( 10 May 2018 08:32 )  PTT:35.4 sec              New results/imaging:

## 2018-05-11 NOTE — PROGRESS NOTE ADULT - PROBLEM SELECTOR PROBLEM 8
Severe protein-calorie malnutrition

## 2018-05-11 NOTE — PROGRESS NOTE ADULT - PROBLEM SELECTOR PROBLEM 3
Anticoagulated on warfarin
Debility
Functional quadriplegia
Anticoagulated on warfarin
Anticoagulated on warfarin

## 2018-05-11 NOTE — DISCHARGE NOTE ADULT - CARE PLAN
Principal Discharge DX:	Upper GI bleed  Goal:	Reversed with K-centra and oral Vitamin K  Assessment and plan of treatment:	You came into the hospital with blood in your vomit. Your coumadin levels were high therefore we gave you medication to reverse it. You  Secondary Diagnosis:	BPH (benign prostatic hyperplasia)  Assessment and plan of treatment:	Your prostate is enlarged. You have a cat catheter that drains urine from your bladder.t is likely you will need cat for a long time until you start urinating on your own.  Secondary Diagnosis:	DVT (deep venous thrombosis)  Assessment and plan of treatment:	You have blood clots in your vein. Therefore you will need to take lovenox injections for 3-6 months. Your primary care doctor can assess if you need to continue on lovenox. Please call [phone] to make an appointment with a [facility] primary care doctor. There is a risk of bleeding with this medication therefore if you notice blood in your stools or urine or vomit blood please obtain medical care immediately.  Secondary Diagnosis:	Dementia Principal Discharge DX:	Upper GI bleed  Goal:	Reversed with K-centra and oral Vitamin K  Assessment and plan of treatment:	You came into the hospital with blood in your vomit. Your coumadin levels were high therefore we gave you medication to reverse it. You are on a different blood thinner now. If you have any episodes of bleeding please seek medical care immediately.  Secondary Diagnosis:	BPH (benign prostatic hyperplasia)  Assessment and plan of treatment:	Your prostate is enlarged. You have a cat catheter that drains urine from your bladder. It is likely you will need cat for a long time until you start urinating on your own.  Secondary Diagnosis:	DVT (deep venous thrombosis)  Assessment and plan of treatment:	You have blood clots in your vein. Therefore you will need to take lovenox injections for 3-6 months. Your primary care doctor can assess if you need to continue on lovenox. Please call [phone] to make an appointment with a [facility] primary care doctor. There is a risk of bleeding with this medication therefore if you notice blood in your stools or urine or vomit blood please obtain medical care immediately.  Secondary Diagnosis:	Dementia  Assessment and plan of treatment:	You have dementia at baseline. You are alert and oriented to only your name and sometimes the place. Please see your primary care doctor to assess your dementia.

## 2018-05-11 NOTE — DISCHARGE NOTE ADULT - PATIENT PORTAL LINK FT
You can access the [facility] Patient Portal, offered by [facility], by registering with the following website: [email or web address]

## 2018-05-11 NOTE — DISCHARGE NOTE ADULT - CARE PROVIDER_API CALL
Jason Polanco (DO), Internal Medicine; Nuclear Cardiology  [address] [place] [address]  Phone: [phone]  Fax: [phone]

## 2018-05-11 NOTE — PROGRESS NOTE ADULT - PROBLEM SELECTOR PLAN 10
DVT ppx: lovenox BID  Diet: Dysphagia diet  Susan Palma PGY-1  Spectre [phone]  Pager # [phone]/ [phone]

## 2018-05-11 NOTE — PROGRESS NOTE ADULT - PROBLEM SELECTOR PROBLEM 7
Inguinal hernia

## 2018-05-11 NOTE — PROGRESS NOTE ADULT - PROBLEM SELECTOR PLAN 3
Noted to have supra-therapeutic INR w/ warfarin  - now resume lovenox BID as EGD without active sign of bleeding

## 2018-05-11 NOTE — PROGRESS NOTE ADULT - NSHPATTENDINGPLANDISCUSS_GEN_ALL_CORE
Medicine. To reach Cardiology Attending call during weekdays Spectra [phone] or Fellow [phone].
Medicine. To reach Cardiology Attending call during weekdays Spectra [phone] or Fellow [phone].
Medicine. To reach Cardiology Attending call during weekdays Spectra [phone] or Fellow [phone].
To reach Cardiology Attending call during weekdays Spectra [phone] or Fellow [phone].

## 2018-05-11 NOTE — PROGRESS NOTE ADULT - SUBJECTIVE AND OBJECTIVE BOX
PAGER:  [phone]               [facility] [id number]              EMAIL [email or web address]   OFFICE [phone]                              ********VASCULAR MEDICINE & CARDIOLOGY PROGRESS NOTE********                            CC:  Bilateral DVT    INTERVAL HISTORY: Tolerating lovenox        HISTORY OF PRESENT ILLNESS:  HPI:  86M w/ hx of b/l LE DVT on warfarin, unspecified dementia (A&Ox1), BPH w/ hx of obstructive uropathy, inguinal hernia and recent admission for encephalopathy w/ RLL pnumonia c/b MATTHEW (d/c 5/1/18) presents to [facility] from Rehab with complaint of "coffee ground emesis x2" per Rehab RN. Hx collected from Rehab RN(Elham) and from HCP Nicole Tang. Per Rehab Rn, am signout was that the patient had "coffee ground" emesis x2 in the setting of elevated INR of 3.06. RN additionally reported that warfarin dose was held last night and VS of the patient at that time were afebrile, 130/20, 88 and on RA. In discussion with the patient's HCP via telephone, she reports that the patient had complaint of abdominal pain for the last 2d and is frustrated that the rehab did not send the patient for evaluation and the hospital sooner. Patient per chart was treated for RLL pneumonia and new b/l LE DVT (Right femoral/deep femoral/common femoral/external iliac and left deep femoral and common femoral) and was evaluated for encephalopathy from uremia however appeared to have progressive dementia. Unable to obtain other history given that the patient is A&Ox1 and is not reliable however at this time he reports not having pain (04 May 2018 14:38)          Allergies    No Known Allergies    Intolerances    	    MEDICATIONS:  enoxaparin Injectable 70 milliGRAM(s) SubCutaneous two times a day          pantoprazole  Injectable 40 milliGRAM(s) IV Push two times a day          PAST MEDICAL & SURGICAL HISTORY:  Inguinal hernia  BPH (benign prostatic hyperplasia)  Dementia  DVT (deep venous thrombosis)  No significant past surgical history      FAMILY HISTORY:  No pertinent family history in first degree relatives      SOCIAL HISTORY:  unchanged    REVIEW OF SYSTEMS:  CONSTITUTIONAL: No fever, weight loss, or fatigue  EYES: No eye pain, visual disturbances, or discharge  ENMT:  No difficulty hearing, tinnitus, vertigo; No sinus or throat pain  NECK: No pain or stiffness  RESPIRATORY: No cough, wheezing, chills or hemoptysis; No Shortness of Breath  CARDIOVASCULAR: No chest pain, palpitations, passing out, dizziness, or leg swelling  GASTROINTESTINAL: No abdominal or epigastric pain. No nausea, vomiting, or hematemesis; No diarrhea or constipation. No melena or hematochezia.  GENITOURINARY: No dysuria, frequency, hematuria, or incontinence  NEUROLOGICAL: No headaches, memory loss, loss of strength, numbness, or tremors  SKIN: No itching, burning, rashes, or lesions   LYMPH Nodes: No enlarged glands  ENDOCRINE: No heat or cold intolerance; No hair loss  MUSCULOSKELETAL: No joint pain or swelling; No muscle, back, or extremity pain  PSYCHIATRIC: No depression, anxiety, mood swings, or difficulty sleeping  HEME/LYMPH: No easy bruising, or bleeding gums  ALLERY AND IMMUNOLOGIC: No hives or eczema	    [ ] All others negative	  [ ] Unable to obtain    PHYSICAL EXAM:  T(C): 36.7 (05-11-18 @ 04:23), Max: 36.7 (05-10-18 @ 12:08)  HR: 88 (05-11-18 @ 04:23) (80 - 95)  BP: 142/77 (05-11-18 @ 04:23) (120/65 - 152/65)  RR: 17 (05-11-18 @ 04:23) (17 - 18)  SpO2: 96% (05-11-18 @ 04:23) (94% - 98%)  Wt(kg): --  I&O's Summary    10 May 2018 07:01  -  11 May 2018 07:00  --------------------------------------------------------  IN: 600 mL / OUT: 800 mL / NET: -200 mL    11 May 2018 07:01  -  11 May 2018 10:39  --------------------------------------------------------  IN: 240 mL / OUT: 0 mL / NET: 240 mL        Appearance: Normal	  HEENT:   Normal oral mucosa, PERRL, EOMI	  Lymphatic: No lymphadenopathy  Cardiovascular: Normal S1 S2, No JVD, No murmurs, No edema  Respiratory: Lungs clear to auscultation	  Psychiatry: A & O x 1-2, Mood & affect appropriate  Gastrointestinal:  Soft, Non-tender, + BS	  Skin: No rashes, No ecchymoses, No cyanosis	  Neurologic: Non-focal  Extremities: Normal range of motion, No clubbing, cyanosis or edema  Vascular: Peripheral pulses palpable 2+ bilaterally      LABS:	 	    CBC Full  -  ( 10 May 2018 09:40 )  WBC Count : 8.31 K/uL  Hemoglobin : 10.3 g/dL  Hematocrit : 31.2 %  Platelet Count - Automated : 404 K/uL  Mean Cell Volume : 91.0 fl  Mean Cell Hemoglobin : 30.0 pg  Mean Cell Hemoglobin Concentration : 33.0 gm/dL

## 2018-05-11 NOTE — PROGRESS NOTE ADULT - ASSESSMENT
86M with bilateral LE DVT, on coumadin, who presents with coffee ground emesis in the setting of supratherapeutic INR. Currently tolerating hep gtt    -Cont lovenox on discharge  -Monitor H/H, watch for bleeding  -Follow up with Dr. Polanco, Tuesday May 22 @8:20 AM.  Dept of Cardiology, [address] [place], [place] [id number].  [phone]

## 2018-05-11 NOTE — DISCHARGE NOTE ADULT - ADDITIONAL INSTRUCTIONS
- please call : [phone] to make an appointment with a primary care doctor at [facility]. They will ask for detailed personal history such as your email, best contact info, insurance. Please see them within 4-6 weeks of hospital discharge.  - Please tell your primary care doctor you need CT chest in 3 weeks to evaluate improvement of your pneumonia - Please follow up with Cardiology Dr. Polanco, Tuesday May 22 @8:20 AM.  Dept of Cardiology, [address] [place] [address].  [phone]  - please call PH: [phone] to make an appointment with a primary care doctor at [facility]. They will ask for detailed personal history such as your email, best contact info, insurance. Please see them within 4-6 weeks of hospital discharge.  - Please tell your primary care doctor you need CT chest in 3 weeks to evaluate improvement of your pneumonia

## 2018-05-11 NOTE — PROGRESS NOTE ADULT - ASSESSMENT
86M w/ hx of b/l LE DVT on warfarin, unspecified dementia (A&Ox1), BPH w/ hx of obstructive uropathy, inguinal hernia and recent admission for encephalopathy w/ RLL pnumonia c/b MATTHEW (d/c 5/1/18) presents to [facility] from Rehab with complaint of "coffee ground emesis x2" and found to have elevated INR reversed by ED w/ suspected upper GI bleed 2/2 anticoagulation, with slowly downtrending Hgb without overt signs of bleed, EGD no active source of bleed now on lovenox BID for b/l DVT.

## 2018-05-11 NOTE — PROGRESS NOTE ADULT - ATTENDING COMMENTS
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 Has GI blood loss, but endoscopy not revealing source. Also has hematuria and resuming anticoagulation remains problematic. Decision to not place IVC filter. Undergoing heparin and if no bleeding issues emerge then long term anticoagulation possibly with enoxaparin.
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 Has GI blood loss, but endoscopy not revealing source. Also has hematuria and resuming anticoagulation remains problematic. Thus consideration for IVC filter.
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 Has GI blood loss, but endoscopy not revealing source. Also has hematuria. Decision to not place IVC filter. Now on anticoagulation possibly with enoxaparin.
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 Has GI blood loss and plan for endoscopy, but procedure postponed today. Seek to resume anticoagulation when feasible.
urine clear.  No signs of bleeding.  STable H/H.    D/c planning 50 minutes.  If re-admitted for furhter bleed, will need IVC filter.   HAs follow up appointment with .
AGree with above with following addendum:    1. DVT  -appreciate cards recs, will further discuss as concern given acute GIB in setting of coumadin in this patient, feel is high risk and in this particular case think may warrant IVC filter.     2. Advanced care planning  -patient is cachectic, with DVT's, GIB and hernia that was incarcarated.  Given bedbound state, chornic cat, patient is a high risk for pressure ulcers and UTI.  Given multiple comorbidities, concern for patient's overall trajectory of health.  After discussion with Celestina (HCP), agreeable to palliative care consult.
Agree with above with following addendum:    #Acute DVT  -comlicated by UGIB and small hematuria  -hematuria personally looked at, blood tinged urine, diluted, patient with HR 60's, not concerned about gross hematuria requiring CBI at this itme.  -discussed case persoanlyl with vascular cardiology, concnered given compression of IVC about higher risk of placing filtre.  -they want to start hep gtt and monitor for bleeding.  given r/b will do wihtout bolus and monitor inpatient.   -if fails trial, will need IVC filter.
No recurrent bleeding. Continue to monitor. EGD on Monday.
Agree with abvoe.  Hb stable for approximately 72 hours.  -EGD today, though likely low yield as bleeding as stopped.  -continued discussions with cardiology given for this patient, IVC filter may be beneficial given acute bleed from coumadin at INR just supratherapeutic, multiple comorbidities and debility going to lead to immobility and continued high risk for DVT.
Agree with above.  Hb stable, however blood tinged urine, mostly dilutional.  Transitiont to lovenox and will monitor at least 24 hours.  If stable, and no worsening of urine, D/C to rehab with vascular cardiology follow up on 5/22 with Dr. Polanco.  hwoever if hematuria is worsening will press for IVC filter.

## 2018-05-11 NOTE — PROGRESS NOTE ADULT - SUBJECTIVE AND OBJECTIVE BOX
Patient is a 86y old  Male who presents with a chief complaint of 86M sent in from Rehab for "coffee ground emesis"x2 (04 May 2018 14:38)      SUBJECTIVE / OVERNIGHT EVENTS: No events overnight. No further episodes of hematuria, hematochezia.    MEDICATIONS  (STANDING):  enoxaparin Injectable 70 milliGRAM(s) SubCutaneous two times a day  pantoprazole  Injectable 40 milliGRAM(s) IV Push two times a day    MEDICATIONS  (PRN):        CAPILLARY BLOOD GLUCOSE        I&O's Summary    10 May 2018 07:01  -  11 May 2018 07:00  --------------------------------------------------------  IN: 600 mL / OUT: 800 mL / NET: -200 mL    11 May 2018 07:01  -  11 May 2018 10:51  --------------------------------------------------------  IN: 240 mL / OUT: 0 mL / NET: 240 mL    PHYSICAL EXAM:  GENERAL: NAD, appears frail  HEAD:  Atraumatic, Normocephalic  ENT: Poor dentition.  CHEST/LUNG: Clear to auscultation bilaterally; No wheeze  HEART: Regular rate and rhythm; No murmurs, rubs, or gallops  ABDOMEN: Noted large L. inguinal hernia, with normal BS, otherwise Soft, Nontender, Nondistended;   : Junior catheter in place, blood tinged.  EXTREMITIES:  2+ Peripheral Pulses, No clubbing, cyanosis, or edema  NEUROLOGY: AAO x 1 to name, non-focal, moves extremities voluntarily,    LABS:                        10.3   8.31  )-----------( 404      ( 10 May 2018 09:40 )             31.2           PTT - ( 10 May 2018 08:32 )  PTT:35.4 sec        Consultant(s) Notes Reviewed:  Cardiology    Care Discussed with Consultants/Other Providers:

## 2018-05-11 NOTE — PROGRESS NOTE ADULT - PROVIDER SPECIALTY LIST ADULT
Cardiology
Gastroenterology
Internal Medicine
Palliative Care
Palliative Care
Trauma Surgery
Internal Medicine

## 2018-05-11 NOTE — PROGRESS NOTE ADULT - PROBLEM SELECTOR PLAN 8
- will need nutrition consult. appears to be in active dying process weeks to months likely from end-stage dementia vs occult malignancy (lung vs prostate)  - palliative consult
appears to be in active dying process weeks to months likely from end-stage dementia vs occult malignancy (lung vs prostate)  - palliative consult and nutrition consult
- will need nutrition consult. appears to be in active dying process weeks to months likely from end-stage dementia vs occult malignancy (lung vs prostate)
- will need nutrition consult. appears to be in active dying process weeks to months likely from end-stage dementia vs occult malignancy (lung vs prostate)  - palliative consult
appears to be in active dying process weeks to months likely from end-stage dementia vs occult malignancy (lung vs prostate)  - palliative consult and nutrition consult
appears to be in active dying process weeks to months likely from end-stage dementia vs occult malignancy (lung vs prostate)  - palliative consult and nutrition consult
- will need nutrition consult. appears to be in active dying process weeks to months likely from end-stage dementia vs occult malignancy (lung vs prostate)

## 2018-05-11 NOTE — PROGRESS NOTE ADULT - PROBLEM SELECTOR PLAN 5
Unspecified dementia  - folate, tsh, b12 normal on last admit  - suspect age-related  -A&Ox1. has exceptional cachexia.

## 2018-05-11 NOTE — PROGRESS NOTE ADULT - PROBLEM SELECTOR PLAN 7
Known to have left inguinal hernia which was non-obstructive on last admission. no signs of obstruction at this time but will get abdominal film to evaluate
this admission c/o abdominal pain, Ct A/P revealed incarceration w/ ischemia at L. hernia, surgery rec no surgical intervention at this time  - perform bedside bowel massage for pain  - no complaints today
Known to have left inguinal hernia which was non-obstructive on last admission. no signs of obstruction at this time but will get abdominal film to evaluate
Known to have left inguinal hernia which was non-obstructive on last admission. no signs of obstruction at this time but will get abdominal film to evaluate
this admission c/o abdominal pain, Ct A/P revealed incarceration w/ ischemia at L. hernia, surgery rec no surgical intervention at this time  - perform bedside bowel massage for pain  - no complaints today
this admission c/o abdominal pain, Ct A/P revealed incarceration w/ ischemia at L. hernia, surgery rec no surgical intervention at this time  - perform bedside bowel massage for pain  - no complaints today
Known to have left inguinal hernia which was non-obstructive on last admission. no signs of obstruction at this time but will get abdominal film to evaluate

## 2018-05-11 NOTE — DISCHARGE NOTE ADULT - HOSPITAL COURSE
86M w/ hx of b/l LE DVT on warfarin, unspecified dementia (A&Ox1), BPH w/ hx of obstructive uropathy, inguinal hernia and recent admission for encephalopathy w/ RLL pnumonia c/b MATTHEW (d/c 5/1/18) presents to [facility] from Rehab with complaint of "coffee ground emesis x2" per Rehab RN. Hx collected from Rehab RN(Elham) and from HCP Nicole Tang. Per Rehab Rn, am signout was that the patient had "coffee ground" emesis x2 in the setting of elevated INR of 3.06. Patient per chart was treated for RLL pneumonia (D/c 5/1/18) and new b/l LE DVT (Right femoral/deep femoral/common femoral/external iliac and left deep femoral and common femoral) and was evaluated for encephalopathy from uremia however appeared to have progressive dementia. During this admission patient received K centra and vitamin K with successful reversal of INR. Under EGD without evidence of active bleeding, noted for hernia, biopsy negative for H. pylori revealed chronic gastritis without intestinal metaplasia. Course complicated by acute abdominal pain with Abd xray demonstration incarceration at site of L. inguinal hernia, surgery performed bedside bowel massage and recommended no acute intervention currently. Pain subsided without recurrence. Can follow up outpatient PRN with surgery. Palliative consulted for GOC with HCP. Plan continued to be same Full code, patient lacks financial means and has no private assets, HCP interested in LTFC therefore in process of applying for medicaid. PT recommended JOCELYNE, will discharge to [facility]. Cardiology recommended therapeutic lovenox for b/l DVT, previously HCP was given options for DOACs however doesn't have financial means to afford it. If patient has recurrent bleed on lovenox, cardiology recommended IVC filter.

## 2018-05-11 NOTE — DISCHARGE NOTE ADULT - OTHER SIGNIFICANT FINDINGS
< from: CT Abdomen and Pelvis w/ IV Cont (05.06.18 @ 22:30) >    FINDINGS:    LUNG BASES: Interval increase in mild bilateral pleural effusions, right   greater than left with associated subsegmental atelectasis.    PERITONEUM:  There is no free air or focal collection.  Trace amount of   free fluid.  LIVER: Normal.  SPLEEN: Normal.  GALLBLADDER: Unremarkable.  BILIARY TREE: Unremarkable.  PANCREAS: Normal.  ADRENAL GLANDS: Normal.  KIDNEYS: Small 3 mm nonobstructive stone in the lower pole the right   kidney. Left renal stones measure up to 5 mm in the lower pole. The left   kidney contains a dominant 7 cm cyst as well as subcentimeter lucencies   too small to characterize.  BOWEL: There is fluid distention of the distal esophagus. The stomach is   incompletely distended. Proximal small bowel loops are collapsed. There   are multiple dilated fluid-filled loops of small bowel with a transition  point in the left inguinal hernia. Distal small bowel loops are   collapsed. There is thickening of the herniated small bowel loop with   hyperenhancement and surrounding fluid concerning for incarceration. The   appendix is not visualized. The colon is collapsed. Nonobstructed colon   also herniates through the inguinal hernia.     URINARY BLADDER: Decompressed by Junior catheter.  PELVIC ORGANS: The prostate is enlarged, measuring 5.7 x 3.5 cm.    There is no significant adenopathy.  VASCULATURE: The aorta is not dilated. There is moderate atherosclerotic   vascular calcification.  RETROPERITONEUM:  There is no mass.  BONES: Mild S-shaped scoliosis of the thoracolumbar spine.  ABDOMINAL WALL: Left inguinal hernia is lateral to the inferior  epigastric artery compatible with the indirect hernia.. The hernia neck   measures 4.1 cm.    IMPRESSION:    Compared to the previous CT of May 4, 2018, there has been interval   development of a small bowel obstruction with a transition point   involving the patient's known left indirect inguinal hernia. The   herniated small bowel demonstrates wall thickening and hyperenhancement   with interloop fluid concerning for incarceration/ischemia. The colon   which has also herniated through this hernia is nonobstructed.    The   findings were discussed with Dr. Lion at 3 am on 5/7/18 with RBV.    < end of copied text >

## 2018-05-16 ENCOUNTER — APPOINTMENT (OUTPATIENT)
Dept: UROLOGY | Facility: CLINIC | Age: 83
End: 2018-05-16

## 2018-05-22 ENCOUNTER — APPOINTMENT (OUTPATIENT)
Dept: CARDIOLOGY | Facility: CLINIC | Age: 83
End: 2018-05-22

## 2018-11-27 NOTE — STUDENT SIGN OFF DOCUMENT - COPY OF STUDENT DOCUMENT REVIEW
Pharmacy Medication Reconciliation Note     List of medications patient is currently taking is complete. Source of information:   1. Patient   2. EMR    Notes regarding home medications:   1. Patient has not received most of his home medication doses in three days. 2. Pt is unsure if he is currently taking plavix. Dispense record shows he hasn't gotten it refilled since Feburary.        [id number] [facility] Suzan, Pharmacy Intern  11/26/2018 9:56 PM physical therapy

## 2018-12-04 NOTE — H&P ADULT - NSHPPHYSICALEXAM_GEN_ALL_CORE
Wilmer Estrella reviewed discharge instructions with the patient. Vital Signs Last 24 Hrs  T(C): 36.4 (04 May 2018 11:20), Max: 36.8 (04 May 2018 08:25)  T(F): 97.5 (04 May 2018 11:20), Max: 98.2 (04 May 2018 08:25)  HR: 106 (04 May 2018 13:20) (106 - 111)  BP: 149/77 (04 May 2018 13:20) (91/71 - 149/77)  BP(mean): --  RR: 16 (04 May 2018 13:20) (16 - 18)  SpO2: 95% (04 May 2018 13:20) (94% - 95%)    GENERAL: NAD, cachexia present, unkept  HEAD:  Atraumatic, Normocephalic  EYES: EOMI, PERRLA, conjunctiva and sclera clear  Mouth: dry oral mucosa, poor dentition  NECK: Supple, no appreciable masses  Lung: normal work of breathing, limited exam due to effort and poor air entry b/l and no rhonchi or crackles  Chest: S1&S2+, rrr, no m/r/g appreciated  ABDOMEN: bs+, soft, nt, nd, no appreciable masses  : cat catheter present, unable to test cva tenderness given pain to moving to side  EXTREMITIES:  radial pulse present b/l, PT present b/l, no pitting edema present  Neuro: A&Ox1(name only), no focal deficits  SKIN: warm and dry, no visible rashes Vital Signs Last 24 Hrs  T(C): 36.4 (04 May 2018 11:20), Max: 36.8 (04 May 2018 08:25)  T(F): 97.5 (04 May 2018 11:20), Max: 98.2 (04 May 2018 08:25)  HR: 106 (04 May 2018 13:20) (106 - 111)  BP: 149/77 (04 May 2018 13:20) (91/71 - 149/77)  BP(mean): --  RR: 16 (04 May 2018 13:20) (16 - 18)  SpO2: 95% (04 May 2018 13:20) (94% - 95%)    GENERAL: NAD, cachexia present, unkept  HEAD:  Atraumatic, Normocephalic  EYES: EOMI, PERRLA, conjunctiva and sclera clear  Mouth: dry oral mucosa, poor dentition  NECK: Supple, no appreciable masses  Lung: normal work of breathing, limited exam due to effort and poor air entry b/l and no rhonchi or crackles  Chest: S1&S2+, rrr, no m/r/g appreciated  ABDOMEN: bs+, soft, nt, nd, no appreciable masses  : cat catheter present, unable to test cva tenderness given pain to moving to side  EXTREMITIES:  radial pulse present b/l, PT present b/l, no pitting edema present  Neuro: A&Ox1(name only), no focal deficits  SKIN: warm and dry, stage I ulcer on sacrum

## 2019-01-26 NOTE — CONSULT NOTE ADULT - CONSULT REASON
History & Physical 
Patient: Jus Aranda MRN: [id number]  CSN: [id number] YOB: 1936  Age: 80 y.o. Sex: male DOA: 1/26/2019 Chief Complaint:  
Chief Complaint Patient presents with  Generalized weakness and poor oral intake HPI:  
 
Jus Aranda is a 80 y.o.  male was brought in to the hospital due to progressive loss of appetite and weakness of more than a week. Patient has  Dementia and lives with his son/  
Per son at bedside patient has minimal food intake and has not been out of bed for more than a week. Patient was started on Oral Abx for UTI few days ago after an ER visit but did not show any improvement In the ER patient was found to have elevated Calcium of 14.2 and ANN He also has Elevated serum protein and reversal or Albumin/ globulin ratio Patient to be admitted for management of work up of hypercalcemia and ANN In the ER he was found to have high grade fever without any source. Influenza test is negative Past Medical History:  
Diagnosis Date  CKD (chronic kidney disease) stage 2, GFR 60-89 ml/min 3/29/2010  Dementia  High blood cholesterol level 3/29/2010  
 HTN (hypertension), benign 3/29/2010  Hyperglycemia 3/29/2010  Microalbuminuria 12/14/2010  Vertigo 3/29/2010 Social History Socioeconomic History  Marital status:  Spouse name: Not on file  Number of children: Not on file  Years of education: Not on file  Highest education level: Not on file Tobacco Use  Smoking status: Never Smoker  Smokeless tobacco: Never Used Substance and Sexual Activity  Alcohol use: No  
 Drug use: No  
 
 
Prior to Admission medications Medication Sig Start Date End Date Taking? Authorizing Provider  
amLODIPine (NORVASC) 5 mg tablet Take 1 Tab by mouth daily.  4/16/18  Yes Milly Shultz MD  
naproxen sodium (ALEVE) 220 mg tablet Take 220 mg by mouth daily as needed
for Pain. Yes Provider, Historical  
meclizine (ANTIVERT) 25 mg tablet Take 1 Tab by mouth three (3) times daily as needed for up to 3 doses. Patient taking differently: Take 1 Tab by mouth three (3) times daily as needed for Dizziness. 18  Yes Debbi Walls MD  
aspirin (ASPIRIN) 325 mg tablet Take 325 mg by mouth daily. Yes Provider, Historical  
trimethoprim-sulfamethoxazole (BACTRIM DS, SEPTRA DS) 160-800 mg per tablet Take 1 Tab by mouth two (2) times a day for 3 days. 19  Adrienne Monzon MD  
 
 
No Known Allergies Review of Systems GENERAL: chronically sick looking, Unkempt HEENT: No change in vision, no earache, tinnitus, sore throat or sinus congestion. NECK: No pain or stiffness. PULMONARY: No shortness of breath, cough or wheeze. Cardiovascular: no pnd or orthopnea, no CP GASTROINTESTINAL: No abdominal pain, nausea, vomiting or diarrhea, melena or bright red blood per rectum. GENITOURINARY: No urinary frequency, urgency, hesitancy or dysuria. MUSCULOSKELETAL: No joint or muscle pain, no back pain, no recent trauma. DERMATOLOGIC: No rash, no itching, no lesions. ENDOCRINE: No polyuria, polydipsia, no heat or cold intolerance. No recent change in weight. HEMATOLOGICAL: No anemia or easy bruising or bleeding. NEUROLOGIC: No headache, seizures, numbness, tingling or weakness. Physical Exam:  
 
Physical Exam: 
Visit Vitals /87 Pulse 67 Temp (!) 101.3 °F (38.5 °C) Wt 59.3 kg (130 lb 11.7 oz) SpO2 99% BMI 18.76 kg/m² Temp (24hrs), Av.7 °F (38.2 °C), Min:97.5 °F (36.4 °C), Max:103.3 °F (39.6 °C) 
   07 -  1900 In: 990 [I.V.:990] Out: -    No intake/output data recorded. ROS limited due to patient's poor communication Labs Reviewed: 
 
BMP:  
Lab Results Component Value Date/Time   2019 11:45 AM  
 K 3.9 2019 11:45 AM  
  2019 11:45 AM  
 CO2 28 2019 11:45 AM  

AGAP 7 01/26/2019 11:45 AM  
  (H) 01/26/2019 11:45 AM  
 BUN 33 (H) 01/26/2019 11:45 AM  
 CREA 2.72 (H) 01/26/2019 11:45 AM  
 GFRAA 27 (L) 01/26/2019 11:45 AM  
 GFRNA 23 (L) 01/26/2019 11:45 AM  
 
CBC:  
Lab Results Component Value Date/Time WBC 4.4 (L) 01/26/2019 11:45 AM  
 HGB 11.0 (L) 01/26/2019 11:45 AM  
 HCT 33.1 (L) 01/26/2019 11:45 AM  
  01/26/2019 11:45 AM  
 
CT and EKG Procedures/imaging: see electronic medical records for all procedures/Xrays and details which were not copied into this note but were reviewed prior to creation of Plan Assessment/Plan Hypercalcemia likely due to multiple myeloma as he has elevated total protein and reversal of AG ratio High grade  Fever with no source of infection, flu negative ANN (acute kidney injury) due to [facility] and Hypercalcemia Dementia Admit patient to telemetry floor  
 -received IV fluid Bolus  
-Will conitnue  ml/ hr  
- monitor BMP and Ionized calcium  
- odrered serum and Urine PEP 
- serum free light chain  
- will need Oncology eval Please call once SPE and UPEP results are back  
- given a dose os Zoledronic acid  
- lasix once - Blood cultures sent  
- started Vanc and Zosyn due to his Immunosuppression and recent admission  
 - continue Amlodipine  
- consider palliative care consult, may be appropriate for hospice Diet: regular Code: full Code DVT/GI Prophylaxis: Hep SQ Discussed with patient and  at bedside about hospital admission and my plan care, both understood and agree with my plan care.  
 
Burak Best MD 
1/26/2019 6:03 PM 
 

Evaluation for IVCF
coffee ground emesis
goals of care
Abdominal Pain

## 2019-12-24 NOTE — SWALLOW BEDSIDE ASSESSMENT ADULT - SWALLOW EVAL: RECOMMENDED DIET
Continue the Dysphagia 1 diet with honey thickened liquids.
Continue the Dysphagia 1 diet with honey thickened liquids.
Improved

## 2020-11-10 NOTE — ED PROVIDER NOTE - OBJECTIVE STATEMENT
Tested Covid + yesterday, AMS & combative today 86 y.o. male recent admission for obstructive uropathy sp stephania, b/l dvt started on coumadin, dementia, returns from rehab for BRBPR and coffee ground emesis since last night per EMS. Per ems last vomiting was 2 hours ago. No history of bleeding in the past. Pt denies any complaints at this time.

## 2020-12-22 NOTE — PHYSICAL THERAPY INITIAL EVALUATION ADULT - GAIT DEVIATIONS NOTED, PT EVAL
increased time in double stance/decreased bibiana/decreased step length/decreased stride length/decreased weight-shifting ability Keystone Flap Text: The defect edges were debeveled with a #15 scalpel blade.  Given the location of the defect, shape of the defect a keystone flap was deemed most appropriate.  Using a sterile surgical marker, an appropriate keystone flap was drawn incorporating the defect, outlining the appropriate donor tissue and placing the expected incisions within the relaxed skin tension lines where possible. The area thus outlined was incised deep to adipose tissue with a #15 scalpel blade.  The skin margins were undermined to an appropriate distance in all directions around the primary defect and laterally outward around the flap utilizing iris scissors.

## 2021-03-09 NOTE — PROGRESS NOTE ADULT - PROBLEM SELECTOR PROBLEM 10
09-Mar-2021 19:20
Prophylactic measure

## 2022-06-06 NOTE — PROGRESS NOTE ADULT - SUBJECTIVE AND OBJECTIVE BOX
Patient presents for visit accompanied by parent  CC: laceration  HPI: Zoran is a 21 yo male who presents with laceration, ER 5/26 after suffering a large laceration to his left thigh. The laceration occurred from a chain saw - that slipped as he was cutting a tree  He has type 1 DM and his sugars have been controlled since the accident  Laceration was deep and they used staples to close it  Was told to have sutures removed next week  Denies fever. No cough, congestion, or runny nose. Denies ear pain, or sore throat. No vomiting, or diarrhea.  ALL:Reviewed and or Reconciled.  MEDS:Reviewed and or Reconciled.  IMM:UTD  PMH:problem list reviewed  ROS:   CONSTITUTIONAL:alert, interactive   EYES:no eye discharge   ENT:no URI sx   RESP:nl breathing, no wheezing or shortness of breath   GI: no vomiting or diarrhea   SKIN:no rash  PHYS. EXAM:vital signs have been reviewed(see nurses notes)   GEN:well nourished, well developed.   SKIN:normal skin turgor, left thigh with large laceration with 7 staples , no erythema   EYES:PERRLA, nl conjuctiva   EARS:nl pinnae, TM's intact, right TM nl, left TM nl   NASAL:mucosa pink, no congestion, no discharge   MOUTH: mucus membranes moist, no pharyngeal erythema   NECK:supple, no masses   RESP:nl resp. effort, clear to auscultation   HEART:RRR, nl s1s2, no murmur or edema   ABD: positive BS, soft, NT,ND,no HSM   MS:nl tone and motor movement of extremities   LYMPH:no cervical nodes   PSYCH:in no acute distress, appropriate and interactive     IMP: Laceration  Follow up next week for staple removal  PLAN:Medications:(see med card)  Tylenol or Ibuprofen prn pain or fever   Educ., diagnoses, and treatment. Supportive care educ.  Return if symptoms persist, worsen, or if new signs and symptoms develop. Call with concerns. F/U at well check and prn.     Cardiology Progress Note    Interval events:  Patient at endoscopy.    Medications:  heparin  Injectable 5000 Unit(s) SubCutaneous every 8 hours  pantoprazole  Injectable 40 milliGRAM(s) IV Push two times a day      Vitals:  T(C): 36.8 (05-07-18 @ 04:12), Max: 36.9 (05-07-18 @ 01:30)  HR: 88 (05-07-18 @ 04:12) (87 - 93)  BP: 142/75 (05-07-18 @ 04:12) (138/78 - 154/73)  BP(mean): --  RR: 18 (05-07-18 @ 04:12) (18 - 18)  SpO2: 96% (05-07-18 @ 04:12) (95% - 96%)  Wt(kg): --  Daily     Daily   I&O's Summary    06 May 2018 07:01  -  07 May 2018 07:00  --------------------------------------------------------  IN: 900 mL / OUT: 1000 mL / NET: -100 mL        Physical Exam:  NAD  PERRL, EOMI  Normal oral muscosa NC/AT  S1, S2, RRR, No m/r/g appreciated, No edema, no elevation in JVP  Clear to auscultation bilaterally  Soft, Non-tender, Non-distended, BS+  No clubbing, No joint deformity   Non-focal  No lymphadenopathy  AAOx3, Mood & affect appropriate  No rashes, No ecchymoses, No cyanosis    Labs:                        10.0   8.3   )-----------( 381      ( 07 May 2018 04:56 )             29.9     05-07    145  |  107  |  18  ----------------------------<  109<H>  4.3   |  29  |  0.97    Ca    8.5      07 May 2018 04:56  Phos  2.5     05-07  Mg     2.1     05-07      PT/INR - ( 06 May 2018 03:34 )   PT: 12.5 sec;   INR: 1.15 ratio         PTT - ( 06 May 2018 03:34 )  PTT:30.9 sec    New results/imaging: Cardiology Progress Note    Interval events:  Patient at endoscopy. Alert and uncomplaining, but confused. Procedure cancelled.    Review of Systems:  Constitutional: [ ] Fever [ ] Chills [ ] Fatigue [ ] Weight change   HEENT: [ ] Blurred vision [ ] Eye Pain [ ] Headache [ ] Runny nose [ ] Sore Throat   Respiratory: [ ] Cough [ ] Wheezing [ ] Shortness of breath  Cardiovascular: [ ] Chest Pain [ ] Palpitations [ ] COYNE [ ] PND [ ] Orthopnea  Gastrointestinal: [ ] Abdominal Pain [ ] Diarrhea [ ] Constipation [ ] Hemorrhoids [ ] Nausea [ ] Vomiting  Genitourinary: [ ] Nocturia [ ] Dysuria [ ] Incontinence  Extremities: [ ] Swelling [ ] Joint Pain  Neurologic: [ ] Focal deficit [ ] Paresthesias [ ] Syncope  Lymphatic: [ ] Swelling [ ] Lymphadenopathy   Skin: [ ] Rash [ ] Ecchymoses [ ] Wounds [ ] Lesions  Psychiatry: [ ] Depression [ ] Suicidal/Homicidal Ideation [ ] Anxiety [ ] Sleep Disturbances  [x ] 10 point review of systems is otherwise negative except as mentioned above             Medications:  heparin  Injectable 5000 Unit(s) SubCutaneous every 8 hours  pantoprazole  Injectable 40 milliGRAM(s) IV Push two times a day      Vitals:  T(C): 36.8 (05-07-18 @ 04:12), Max: 36.9 (05-07-18 @ 01:30)  HR: 88 (05-07-18 @ 04:12) (87 - 93)  BP: 142/75 (05-07-18 @ 04:12) (138/78 - 154/73)  BP(mean): --  RR: 18 (05-07-18 @ 04:12) (18 - 18)  SpO2: 96% (05-07-18 @ 04:12) (95% - 96%)  Wt(kg): --  Daily     Daily   I&O's Summary    06 May 2018 07:01  -  07 May 2018 07:00  --------------------------------------------------------  IN: 900 mL / OUT: 1000 mL / NET: -100 mL        Physical Exam:  NAD  PERRL, EOMI  Normal oral muscosa NC/AT  S1, S2, RRR, No m/r/g appreciated, No edema, no elevation in JVP  Clear to auscultation bilaterally  Soft, Non-tender, Non-distended, BS+  No clubbing, No joint deformity   Non-focal  No lymphadenopathy  AAOx3, Mood & affect appropriate  No rashes, No ecchymoses, No cyanosis    Labs:                        10.0   8.3   )-----------( 381      ( 07 May 2018 04:56 )             29.9     05-07    145  |  107  |  18  ----------------------------<  109<H>  4.3   |  29  |  0.97    Ca    8.5      07 May 2018 04:56  Phos  2.5     05-07  Mg     2.1     05-07      PT/INR - ( 06 May 2018 03:34 )   PT: 12.5 sec;   INR: 1.15 ratio         PTT - ( 06 May 2018 03:34 )  PTT:30.9 sec    New results/imaging:

## 2023-05-08 NOTE — ED ADULT NURSE NOTE - CHPI ED SYMPTOMS NEG
HEMODIALYSIS TREATMENT NOTE    Date: 5/8/2023  Time: 1:28 PM    Data:  Weight change:  1 kg  Ultrafiltration - Post Run Net Total Removed (mL):  1000  Vascular Access Status: P T/B  Dialyzer Rinse:  Streaked  Total Blood Volume Processed:57.29 L  Total Dialysis (Treatment) Time:  2.5 Hours    Lab:   No    Interventions:    AVF cannulation G15    Assessment:    HD run for 2.5 via L AVF. P T/B. No signs of infection or stenosis. On 400BFR/. On K2 Ca3. 1L UF pulled. A&Ox4.Stable run. Patient rinsed back and hemostasis achieved within 10 minutes. Hand off report given to PCN.     Plan:      Next HDPer Renal Care team     no tingling/no vomiting/no fever/no deformity
